# Patient Record
Sex: MALE | Race: ASIAN | NOT HISPANIC OR LATINO
[De-identification: names, ages, dates, MRNs, and addresses within clinical notes are randomized per-mention and may not be internally consistent; named-entity substitution may affect disease eponyms.]

---

## 2017-04-18 ENCOUNTER — TRANSCRIPTION ENCOUNTER (OUTPATIENT)
Age: 37
End: 2017-04-18

## 2017-05-11 ENCOUNTER — TRANSCRIPTION ENCOUNTER (OUTPATIENT)
Age: 37
End: 2017-05-11

## 2017-07-07 ENCOUNTER — TRANSCRIPTION ENCOUNTER (OUTPATIENT)
Age: 37
End: 2017-07-07

## 2018-06-04 ENCOUNTER — TRANSCRIPTION ENCOUNTER (OUTPATIENT)
Age: 38
End: 2018-06-04

## 2019-08-03 ENCOUNTER — TRANSCRIPTION ENCOUNTER (OUTPATIENT)
Age: 39
End: 2019-08-03

## 2022-06-24 ENCOUNTER — NON-APPOINTMENT (OUTPATIENT)
Age: 42
End: 2022-06-24

## 2022-07-28 ENCOUNTER — NON-APPOINTMENT (OUTPATIENT)
Age: 42
End: 2022-07-28

## 2022-07-29 ENCOUNTER — APPOINTMENT (OUTPATIENT)
Dept: FAMILY MEDICINE | Facility: CLINIC | Age: 42
End: 2022-07-29

## 2022-07-29 VITALS
WEIGHT: 301 LBS | HEART RATE: 84 BPM | OXYGEN SATURATION: 96 % | BODY MASS INDEX: 43.09 KG/M2 | TEMPERATURE: 98 F | HEIGHT: 70 IN | DIASTOLIC BLOOD PRESSURE: 88 MMHG | SYSTOLIC BLOOD PRESSURE: 142 MMHG

## 2022-07-29 DIAGNOSIS — Z23 ENCOUNTER FOR IMMUNIZATION: ICD-10-CM

## 2022-07-29 DIAGNOSIS — Z87.891 PERSONAL HISTORY OF NICOTINE DEPENDENCE: ICD-10-CM

## 2022-07-29 DIAGNOSIS — Z78.9 OTHER SPECIFIED HEALTH STATUS: ICD-10-CM

## 2022-07-29 DIAGNOSIS — Z82.49 FAMILY HISTORY OF ISCHEMIC HEART DISEASE AND OTHER DISEASES OF THE CIRCULATORY SYSTEM: ICD-10-CM

## 2022-07-29 DIAGNOSIS — Z86.39 PERSONAL HISTORY OF OTHER ENDOCRINE, NUTRITIONAL AND METABOLIC DISEASE: ICD-10-CM

## 2022-07-29 DIAGNOSIS — H91.90 UNSPECIFIED HEARING LOSS, UNSPECIFIED EAR: ICD-10-CM

## 2022-07-29 DIAGNOSIS — D56.3 THALASSEMIA MINOR: ICD-10-CM

## 2022-07-29 DIAGNOSIS — Z84.1 FAMILY HISTORY OF DISORDERS OF KIDNEY AND URETER: ICD-10-CM

## 2022-07-29 DIAGNOSIS — D64.9 ANEMIA, UNSPECIFIED: ICD-10-CM

## 2022-07-29 DIAGNOSIS — Z77.22 CONTACT WITH AND (SUSPECTED) EXPOSURE TO ENVIRONMENTAL TOBACCO SMOKE (ACUTE) (CHRONIC): ICD-10-CM

## 2022-07-29 PROCEDURE — 99386 PREV VISIT NEW AGE 40-64: CPT | Mod: 25

## 2022-07-29 PROCEDURE — 90471 IMMUNIZATION ADMIN: CPT

## 2022-07-29 PROCEDURE — 90715 TDAP VACCINE 7 YRS/> IM: CPT

## 2022-07-29 RX ORDER — AMOXICILLIN AND CLAVULANATE POTASSIUM 875; 125 MG/1; MG/1
875-125 TABLET, COATED ORAL
Qty: 14 | Refills: 0 | Status: COMPLETED | COMMUNITY
Start: 2022-05-26

## 2022-07-29 NOTE — HISTORY OF PRESENT ILLNESS
[FreeTextEntry1] : annual [de-identified] : 43 yo M with no significant PMH presents for annual.\par \par diet-- regular\par exercise-- just started recently\par \par HTN-- was on meds previously but does not remember which one.

## 2022-08-02 ENCOUNTER — TRANSCRIPTION ENCOUNTER (OUTPATIENT)
Age: 42
End: 2022-08-02

## 2022-08-02 ENCOUNTER — NON-APPOINTMENT (OUTPATIENT)
Age: 42
End: 2022-08-02

## 2022-08-02 LAB
25(OH)D3 SERPL-MCNC: 23.1 NG/ML
ALBUMIN SERPL ELPH-MCNC: 4.6 G/DL
ALP BLD-CCNC: 65 U/L
ALT SERPL-CCNC: 22 U/L
ANION GAP SERPL CALC-SCNC: 13 MMOL/L
AST SERPL-CCNC: 17 U/L
BASOPHILS # BLD AUTO: 0.03 K/UL
BASOPHILS NFR BLD AUTO: 0.5 %
BILIRUB SERPL-MCNC: 0.3 MG/DL
BUN SERPL-MCNC: 13 MG/DL
CALCIUM SERPL-MCNC: 8.7 MG/DL
CHLORIDE SERPL-SCNC: 103 MMOL/L
CHOLEST SERPL-MCNC: 178 MG/DL
CO2 SERPL-SCNC: 25 MMOL/L
CREAT SERPL-MCNC: 0.71 MG/DL
EGFR: 118 ML/MIN/1.73M2
EOSINOPHIL # BLD AUTO: 0.17 K/UL
EOSINOPHIL NFR BLD AUTO: 2.9 %
ESTIMATED AVERAGE GLUCOSE: 114 MG/DL
GLUCOSE SERPL-MCNC: 72 MG/DL
HBA1C MFR BLD HPLC: 5.6 %
HCT VFR BLD CALC: 45.2 %
HDLC SERPL-MCNC: 30 MG/DL
HGB BLD-MCNC: 12.8 G/DL
IMM GRANULOCYTES NFR BLD AUTO: 0.2 %
LDLC SERPL CALC-MCNC: 114 MG/DL
LYMPHOCYTES # BLD AUTO: 1.37 K/UL
LYMPHOCYTES NFR BLD AUTO: 23.4 %
MAN DIFF?: NORMAL
MCHC RBC-ENTMCNC: 21.1 PG
MCHC RBC-ENTMCNC: 28.3 GM/DL
MCV RBC AUTO: 74.5 FL
MONOCYTES # BLD AUTO: 0.51 K/UL
MONOCYTES NFR BLD AUTO: 8.7 %
NEUTROPHILS # BLD AUTO: 3.77 K/UL
NEUTROPHILS NFR BLD AUTO: 64.3 %
NONHDLC SERPL-MCNC: 148 MG/DL
PLATELET # BLD AUTO: 335 K/UL
POTASSIUM SERPL-SCNC: 3.9 MMOL/L
PROT SERPL-MCNC: 7.2 G/DL
RBC # BLD: 6.07 M/UL
RBC # FLD: 18.4 %
SODIUM SERPL-SCNC: 141 MMOL/L
TRIGL SERPL-MCNC: 170 MG/DL
TSH SERPL-ACNC: 2.5 UIU/ML
VIT B12 SERPL-MCNC: 421 PG/ML
WBC # FLD AUTO: 5.86 K/UL

## 2022-09-30 ENCOUNTER — APPOINTMENT (OUTPATIENT)
Dept: FAMILY MEDICINE | Facility: CLINIC | Age: 42
End: 2022-09-30

## 2022-09-30 VITALS
HEIGHT: 70 IN | HEART RATE: 82 BPM | OXYGEN SATURATION: 95 % | BODY MASS INDEX: 42.37 KG/M2 | DIASTOLIC BLOOD PRESSURE: 92 MMHG | SYSTOLIC BLOOD PRESSURE: 140 MMHG | WEIGHT: 296 LBS | TEMPERATURE: 98.2 F

## 2022-09-30 VITALS — SYSTOLIC BLOOD PRESSURE: 134 MMHG | DIASTOLIC BLOOD PRESSURE: 90 MMHG

## 2022-09-30 PROCEDURE — 99213 OFFICE O/P EST LOW 20 MIN: CPT

## 2022-09-30 NOTE — HISTORY OF PRESENT ILLNESS
[FreeTextEntry1] : f/u BP [de-identified] : 43 yo M with HTN presents for BP f/u. Pt admits to being inconsistent with amlodipine. Does not always check BP as well. Does report when he checks at home, BPs usually 130s/80s. Has been trying to eat healthier. Lost 5 lbs since last visit.\par \par

## 2022-11-18 ENCOUNTER — APPOINTMENT (OUTPATIENT)
Dept: FAMILY MEDICINE | Facility: CLINIC | Age: 42
End: 2022-11-18

## 2022-11-18 VITALS
HEIGHT: 70 IN | WEIGHT: 296 LBS | DIASTOLIC BLOOD PRESSURE: 91 MMHG | HEART RATE: 96 BPM | OXYGEN SATURATION: 97 % | BODY MASS INDEX: 42.37 KG/M2 | TEMPERATURE: 97.5 F | SYSTOLIC BLOOD PRESSURE: 142 MMHG

## 2022-11-18 VITALS — SYSTOLIC BLOOD PRESSURE: 124 MMHG | DIASTOLIC BLOOD PRESSURE: 90 MMHG

## 2022-11-18 VITALS — DIASTOLIC BLOOD PRESSURE: 69 MMHG | SYSTOLIC BLOOD PRESSURE: 126 MMHG

## 2022-11-18 DIAGNOSIS — J18.9 PNEUMONIA, UNSPECIFIED ORGANISM: ICD-10-CM

## 2022-11-18 PROCEDURE — 99214 OFFICE O/P EST MOD 30 MIN: CPT

## 2022-11-18 RX ORDER — METHYLPREDNISOLONE 4 MG/1
4 TABLET ORAL
Qty: 21 | Refills: 0 | Status: COMPLETED | COMMUNITY
Start: 2022-11-05

## 2022-11-18 RX ORDER — AZITHROMYCIN 250 MG/1
250 TABLET, FILM COATED ORAL
Qty: 6 | Refills: 0 | Status: COMPLETED | COMMUNITY
Start: 2022-11-05

## 2022-11-18 RX ORDER — BENZONATATE 200 MG/1
200 CAPSULE ORAL
Qty: 30 | Refills: 1 | Status: ACTIVE | COMMUNITY
Start: 2022-11-05 | End: 1900-01-01

## 2022-11-18 RX ORDER — AMLODIPINE BESYLATE 10 MG/1
10 TABLET ORAL DAILY
Qty: 90 | Refills: 3 | Status: ACTIVE | COMMUNITY
Start: 2022-07-29

## 2022-11-18 RX ORDER — AMOXICILLIN 500 MG/1
500 CAPSULE ORAL
Qty: 42 | Refills: 0 | Status: COMPLETED | COMMUNITY
Start: 2022-11-06

## 2022-11-18 NOTE — HISTORY OF PRESENT ILLNESS
[FreeTextEntry8] : cc-- pneumonia f/u\par \par 43 yo M was dx atypical pneumonia at [facility] 2 weeks ago--- had CXR findings. Report scanned in. He was treated with amoxicillin, azithromycin, medrol pack and benzonatate. He felt better after 1 week. He feels cough slowly coming back. Also having left sided rib pain, worse with coughing and movement.\par \par BPs at home have been consistently 140s/90s. Has not been using any OTC cough medicine at the time. \par  No

## 2023-01-25 ENCOUNTER — APPOINTMENT (OUTPATIENT)
Dept: FAMILY MEDICINE | Facility: CLINIC | Age: 43
End: 2023-01-25

## 2023-07-14 ENCOUNTER — RX RENEWAL (OUTPATIENT)
Age: 43
End: 2023-07-14

## 2023-07-14 RX ORDER — AMLODIPINE BESYLATE 5 MG/1
5 TABLET ORAL
Qty: 90 | Refills: 3 | Status: ACTIVE | COMMUNITY
Start: 2023-07-14 | End: 1900-01-01

## 2024-01-02 ENCOUNTER — LABORATORY RESULT (OUTPATIENT)
Age: 44
End: 2024-01-02

## 2024-01-02 ENCOUNTER — APPOINTMENT (OUTPATIENT)
Dept: INTERNAL MEDICINE | Facility: CLINIC | Age: 44
End: 2024-01-02
Payer: COMMERCIAL

## 2024-01-02 VITALS
OXYGEN SATURATION: 94 % | WEIGHT: 301 LBS | HEIGHT: 70 IN | BODY MASS INDEX: 43.09 KG/M2 | DIASTOLIC BLOOD PRESSURE: 98 MMHG | HEART RATE: 89 BPM | SYSTOLIC BLOOD PRESSURE: 163 MMHG

## 2024-01-02 VITALS — SYSTOLIC BLOOD PRESSURE: 150 MMHG | DIASTOLIC BLOOD PRESSURE: 90 MMHG

## 2024-01-02 DIAGNOSIS — Z00.00 ENCOUNTER FOR GENERAL ADULT MEDICAL EXAMINATION W/OUT ABNORMAL FINDINGS: ICD-10-CM

## 2024-01-02 DIAGNOSIS — E55.9 VITAMIN D DEFICIENCY, UNSPECIFIED: ICD-10-CM

## 2024-01-02 DIAGNOSIS — I10 ESSENTIAL (PRIMARY) HYPERTENSION: ICD-10-CM

## 2024-01-02 DIAGNOSIS — R06.83 SNORING: ICD-10-CM

## 2024-01-02 PROCEDURE — 99396 PREV VISIT EST AGE 40-64: CPT | Mod: 25

## 2024-01-02 PROCEDURE — 99214 OFFICE O/P EST MOD 30 MIN: CPT | Mod: 25

## 2024-01-02 NOTE — HISTORY OF PRESENT ILLNESS
[FreeTextEntry1] : annual [de-identified] : 44 yo M with HTN presents for annual.   Lost 15 lbs but gained back.   Diet/exercise poor. + snoring. Sleeps well, but doesn't sleep enough.  Noncompliant with meds-- forgets to take them. Forgot to take this morning. Also not really checking at home. On amlodipine 7.5mg (1.5 tablets)

## 2024-01-02 NOTE — HEALTH RISK ASSESSMENT
[Fully functional (bathing, dressing, toileting, transferring, walking, feeding)] : Fully functional (bathing, dressing, toileting, transferring, walking, feeding) [Fully functional (using the telephone, shopping, preparing meals, housekeeping, doing laundry, using] : Fully functional and needs no help or supervision to perform IADLs (using the telephone, shopping, preparing meals, housekeeping, doing laundry, using transportation, managing medications and managing finances) [Never] : Never

## 2024-01-04 ENCOUNTER — TRANSCRIPTION ENCOUNTER (OUTPATIENT)
Age: 44
End: 2024-01-04

## 2024-01-04 LAB
25(OH)D3 SERPL-MCNC: 22.6 NG/ML
ALBUMIN SERPL ELPH-MCNC: 4.5 G/DL
ALP BLD-CCNC: 64 U/L
ALT SERPL-CCNC: 24 U/L
ANION GAP SERPL CALC-SCNC: 15 MMOL/L
AST SERPL-CCNC: 17 U/L
BASOPHILS # BLD AUTO: 0.03 K/UL
BASOPHILS NFR BLD AUTO: 0.4 %
BILIRUB SERPL-MCNC: 0.4 MG/DL
BUN SERPL-MCNC: 9 MG/DL
CALCIUM SERPL-MCNC: 9.2 MG/DL
CHLORIDE SERPL-SCNC: 104 MMOL/L
CHOLEST SERPL-MCNC: 182 MG/DL
CO2 SERPL-SCNC: 25 MMOL/L
CREAT SERPL-MCNC: 0.7 MG/DL
EGFR: 118 ML/MIN/1.73M2
EOSINOPHIL # BLD AUTO: 0.2 K/UL
EOSINOPHIL NFR BLD AUTO: 2.9 %
ESTIMATED AVERAGE GLUCOSE: 97 MG/DL
GLUCOSE SERPL-MCNC: 60 MG/DL
HBA1C MFR BLD HPLC: 5 %
HCT VFR BLD CALC: 44.9 %
HDLC SERPL-MCNC: 31 MG/DL
HGB BLD-MCNC: 13.4 G/DL
IMM GRANULOCYTES NFR BLD AUTO: 0.4 %
LDLC SERPL CALC-MCNC: 123 MG/DL
LYMPHOCYTES # BLD AUTO: 1.29 K/UL
LYMPHOCYTES NFR BLD AUTO: 18.6 %
MAN DIFF?: NORMAL
MCHC RBC-ENTMCNC: 20.3 PG
MCHC RBC-ENTMCNC: 29.8 GM/DL
MCV RBC AUTO: 68 FL
MONOCYTES # BLD AUTO: 0.64 K/UL
MONOCYTES NFR BLD AUTO: 9.2 %
NEUTROPHILS # BLD AUTO: 4.75 K/UL
NEUTROPHILS NFR BLD AUTO: 68.5 %
NONHDLC SERPL-MCNC: 151 MG/DL
PLATELET # BLD AUTO: 350 K/UL
POTASSIUM SERPL-SCNC: 3.4 MMOL/L
PROT SERPL-MCNC: 7.5 G/DL
RBC # BLD: 6.6 M/UL
RBC # FLD: 19.2 %
SODIUM SERPL-SCNC: 144 MMOL/L
TRIGL SERPL-MCNC: 154 MG/DL
TSH SERPL-ACNC: 3.37 UIU/ML
VIT B12 SERPL-MCNC: 434 PG/ML
WBC # FLD AUTO: 6.94 K/UL

## 2024-12-08 ENCOUNTER — NON-APPOINTMENT (OUTPATIENT)
Age: 44
End: 2024-12-08

## 2025-01-02 ENCOUNTER — INPATIENT (INPATIENT)
Facility: HOSPITAL | Age: 45
LOS: 11 days | Discharge: HOME | DRG: 641 | End: 2025-01-14
Attending: SURGERY | Admitting: STUDENT IN AN ORGANIZED HEALTH CARE EDUCATION/TRAINING PROGRAM
Payer: COMMERCIAL

## 2025-01-02 VITALS
SYSTOLIC BLOOD PRESSURE: 133 MMHG | OXYGEN SATURATION: 94 % | RESPIRATION RATE: 17 BRPM | WEIGHT: 300.05 LBS | HEART RATE: 86 BPM | HEIGHT: 70 IN | TEMPERATURE: 98 F | DIASTOLIC BLOOD PRESSURE: 83 MMHG

## 2025-01-02 DIAGNOSIS — E16.2 HYPOGLYCEMIA, UNSPECIFIED: ICD-10-CM

## 2025-01-02 DIAGNOSIS — I10 ESSENTIAL (PRIMARY) HYPERTENSION: ICD-10-CM

## 2025-01-02 DIAGNOSIS — Z29.9 ENCOUNTER FOR PROPHYLACTIC MEASURES, UNSPECIFIED: ICD-10-CM

## 2025-01-02 LAB
ADD ON TEST-SPECIMEN IN LAB: SIGNIFICANT CHANGE UP
ADD ON TEST-SPECIMEN IN LAB: SIGNIFICANT CHANGE UP
ALBUMIN SERPL ELPH-MCNC: 4.1 G/DL — SIGNIFICANT CHANGE UP (ref 3.3–5)
ALP SERPL-CCNC: 76 U/L — SIGNIFICANT CHANGE UP (ref 40–120)
ALT FLD-CCNC: 28 U/L — SIGNIFICANT CHANGE UP (ref 10–45)
ANION GAP SERPL CALC-SCNC: 14 MMOL/L — SIGNIFICANT CHANGE UP (ref 5–17)
ANISOCYTOSIS BLD QL: SLIGHT — SIGNIFICANT CHANGE UP
APPEARANCE UR: CLEAR — SIGNIFICANT CHANGE UP
AST SERPL-CCNC: 25 U/L — SIGNIFICANT CHANGE UP (ref 10–40)
B-OH-BUTYR SERPL-SCNC: 0 MMOL/L — SIGNIFICANT CHANGE UP
BACTERIA # UR AUTO: NEGATIVE /HPF — SIGNIFICANT CHANGE UP
BASOPHILS # BLD AUTO: 0.05 K/UL — SIGNIFICANT CHANGE UP (ref 0–0.2)
BASOPHILS NFR BLD AUTO: 0.8 % — SIGNIFICANT CHANGE UP (ref 0–2)
BILIRUB SERPL-MCNC: 0.5 MG/DL — SIGNIFICANT CHANGE UP (ref 0.2–1.2)
BILIRUB UR-MCNC: NEGATIVE — SIGNIFICANT CHANGE UP
BUN SERPL-MCNC: 8 MG/DL — SIGNIFICANT CHANGE UP (ref 7–23)
CALCIUM SERPL-MCNC: 8.8 MG/DL — SIGNIFICANT CHANGE UP (ref 8.4–10.5)
CAST: 0 /LPF — SIGNIFICANT CHANGE UP (ref 0–4)
CHLORIDE SERPL-SCNC: 104 MMOL/L — SIGNIFICANT CHANGE UP (ref 96–108)
CO2 SERPL-SCNC: 21 MMOL/L — LOW (ref 22–31)
COLOR SPEC: YELLOW — SIGNIFICANT CHANGE UP
CREAT SERPL-MCNC: 0.66 MG/DL — SIGNIFICANT CHANGE UP (ref 0.5–1.3)
DACRYOCYTES BLD QL SMEAR: SLIGHT — SIGNIFICANT CHANGE UP
DIFF PNL FLD: NEGATIVE — SIGNIFICANT CHANGE UP
EGFR: 119 ML/MIN/1.73M2 — SIGNIFICANT CHANGE UP
EOSINOPHIL # BLD AUTO: 0.18 K/UL — SIGNIFICANT CHANGE UP (ref 0–0.5)
EOSINOPHIL NFR BLD AUTO: 2.6 % — SIGNIFICANT CHANGE UP (ref 0–6)
FLUAV AG NPH QL: SIGNIFICANT CHANGE UP
FLUBV AG NPH QL: SIGNIFICANT CHANGE UP
GLUCOSE BLDC GLUCOMTR-MCNC: 65 MG/DL — LOW (ref 70–99)
GLUCOSE BLDC GLUCOMTR-MCNC: 66 MG/DL — LOW (ref 70–99)
GLUCOSE BLDC GLUCOMTR-MCNC: 68 MG/DL — LOW (ref 70–99)
GLUCOSE BLDC GLUCOMTR-MCNC: 89 MG/DL — SIGNIFICANT CHANGE UP (ref 70–99)
GLUCOSE BLDC GLUCOMTR-MCNC: 98 MG/DL — SIGNIFICANT CHANGE UP (ref 70–99)
GLUCOSE SERPL-MCNC: 39 MG/DL — CRITICAL LOW (ref 70–99)
GLUCOSE UR QL: NEGATIVE MG/DL — SIGNIFICANT CHANGE UP
HCT VFR BLD CALC: 45 % — SIGNIFICANT CHANGE UP (ref 39–50)
HGB BLD-MCNC: 13.5 G/DL — SIGNIFICANT CHANGE UP (ref 13–17)
INSULIN SERPL-MCNC: 27.6 UU/ML — HIGH (ref 2.6–24.9)
KETONES UR-MCNC: NEGATIVE MG/DL — SIGNIFICANT CHANGE UP
LEUKOCYTE ESTERASE UR-ACNC: NEGATIVE — SIGNIFICANT CHANGE UP
LYMPHOCYTES # BLD AUTO: 0.52 K/UL — LOW (ref 1–3.3)
LYMPHOCYTES # BLD AUTO: 7.7 % — LOW (ref 13–44)
MANUAL SMEAR VERIFICATION: SIGNIFICANT CHANGE UP
MCHC RBC-ENTMCNC: 20.3 PG — LOW (ref 27–34)
MCHC RBC-ENTMCNC: 30 G/DL — LOW (ref 32–36)
MCV RBC AUTO: 67.8 FL — LOW (ref 80–100)
MICROCYTES BLD QL: SLIGHT — SIGNIFICANT CHANGE UP
MONOCYTES # BLD AUTO: 0.52 K/UL — SIGNIFICANT CHANGE UP (ref 0–0.9)
MONOCYTES NFR BLD AUTO: 7.7 % — SIGNIFICANT CHANGE UP (ref 2–14)
NEUTROPHILS # BLD AUTO: 5.48 K/UL — SIGNIFICANT CHANGE UP (ref 1.8–7.4)
NEUTROPHILS NFR BLD AUTO: 81.2 % — HIGH (ref 43–77)
NITRITE UR-MCNC: NEGATIVE — SIGNIFICANT CHANGE UP
OVALOCYTES BLD QL SMEAR: SLIGHT — SIGNIFICANT CHANGE UP
PH UR: 7.5 — SIGNIFICANT CHANGE UP (ref 5–8)
PLAT MORPH BLD: NORMAL — SIGNIFICANT CHANGE UP
PLATELET # BLD AUTO: 389 K/UL — SIGNIFICANT CHANGE UP (ref 150–400)
POIKILOCYTOSIS BLD QL AUTO: SLIGHT — SIGNIFICANT CHANGE UP
POLYCHROMASIA BLD QL SMEAR: SLIGHT — SIGNIFICANT CHANGE UP
POTASSIUM SERPL-MCNC: 3.3 MMOL/L — LOW (ref 3.5–5.3)
POTASSIUM SERPL-SCNC: 3.3 MMOL/L — LOW (ref 3.5–5.3)
PROT SERPL-MCNC: 7.7 G/DL — SIGNIFICANT CHANGE UP (ref 6–8.3)
PROT UR-MCNC: NEGATIVE MG/DL — SIGNIFICANT CHANGE UP
RBC # BLD: 6.64 M/UL — HIGH (ref 4.2–5.8)
RBC # FLD: 18.2 % — HIGH (ref 10.3–14.5)
RBC BLD AUTO: ABNORMAL
RBC CASTS # UR COMP ASSIST: 0 /HPF — SIGNIFICANT CHANGE UP (ref 0–4)
RSV RNA NPH QL NAA+NON-PROBE: SIGNIFICANT CHANGE UP
SARS-COV-2 RNA SPEC QL NAA+PROBE: SIGNIFICANT CHANGE UP
SCHISTOCYTES BLD QL AUTO: SLIGHT — SIGNIFICANT CHANGE UP
SODIUM SERPL-SCNC: 139 MMOL/L — SIGNIFICANT CHANGE UP (ref 135–145)
SP GR SPEC: >1.03 — HIGH (ref 1–1.03)
SQUAMOUS # UR AUTO: 0 /HPF — SIGNIFICANT CHANGE UP (ref 0–5)
TSH SERPL-MCNC: 1.6 UIU/ML — SIGNIFICANT CHANGE UP (ref 0.27–4.2)
UROBILINOGEN FLD QL: 0.2 MG/DL — SIGNIFICANT CHANGE UP (ref 0.2–1)
WBC # BLD: 6.75 K/UL — SIGNIFICANT CHANGE UP (ref 3.8–10.5)
WBC # FLD AUTO: 6.75 K/UL — SIGNIFICANT CHANGE UP (ref 3.8–10.5)
WBC UR QL: 1 /HPF — SIGNIFICANT CHANGE UP (ref 0–5)

## 2025-01-02 PROCEDURE — 99255 IP/OBS CONSLTJ NEW/EST HI 80: CPT | Mod: GC

## 2025-01-02 PROCEDURE — 99223 1ST HOSP IP/OBS HIGH 75: CPT | Mod: GC

## 2025-01-02 PROCEDURE — 74177 CT ABD & PELVIS W/CONTRAST: CPT | Mod: 26,MC

## 2025-01-02 PROCEDURE — 70450 CT HEAD/BRAIN W/O DYE: CPT | Mod: 26,MC

## 2025-01-02 PROCEDURE — 99285 EMERGENCY DEPT VISIT HI MDM: CPT

## 2025-01-02 RX ORDER — DEXTROSE MONOHYDRATE 25 G/50ML
12.5 INJECTION, SOLUTION INTRAVENOUS ONCE
Refills: 0 | Status: COMPLETED | OUTPATIENT
Start: 2025-01-02 | End: 2025-01-02

## 2025-01-02 RX ORDER — DEXTROSE MONOHYDRATE 25 G/50ML
25 INJECTION, SOLUTION INTRAVENOUS ONCE
Refills: 0 | Status: DISCONTINUED | OUTPATIENT
Start: 2025-01-02 | End: 2025-01-03

## 2025-01-02 RX ORDER — SODIUM CHLORIDE 9 MG/ML
1000 INJECTION, SOLUTION INTRAVENOUS
Refills: 0 | Status: DISCONTINUED | OUTPATIENT
Start: 2025-01-02 | End: 2025-01-03

## 2025-01-02 RX ORDER — DEXTROSE MONOHYDRATE 25 G/50ML
25 INJECTION, SOLUTION INTRAVENOUS ONCE
Refills: 0 | Status: COMPLETED | OUTPATIENT
Start: 2025-01-02 | End: 2025-01-02

## 2025-01-02 RX ORDER — ENOXAPARIN SODIUM 60 MG/.6ML
40 INJECTION INTRAVENOUS; SUBCUTANEOUS EVERY 24 HOURS
Refills: 0 | Status: DISCONTINUED | OUTPATIENT
Start: 2025-01-02 | End: 2025-01-09

## 2025-01-02 RX ORDER — SODIUM CHLORIDE 9 MG/ML
1000 INJECTION, SOLUTION INTRAMUSCULAR; INTRAVENOUS; SUBCUTANEOUS ONCE
Refills: 0 | Status: COMPLETED | OUTPATIENT
Start: 2025-01-02 | End: 2025-01-02

## 2025-01-02 RX ORDER — GLUCAGON INJECTION, SOLUTION 0.5 MG/.1ML
1 INJECTION, SOLUTION SUBCUTANEOUS ONCE
Refills: 0 | Status: DISCONTINUED | OUTPATIENT
Start: 2025-01-02 | End: 2025-01-03

## 2025-01-02 RX ORDER — POTASSIUM CHLORIDE 600 MG/1
40 TABLET, FILM COATED, EXTENDED RELEASE ORAL ONCE
Refills: 0 | Status: COMPLETED | OUTPATIENT
Start: 2025-01-02 | End: 2025-01-02

## 2025-01-02 RX ORDER — DEXTROSE MONOHYDRATE 25 G/50ML
12.5 INJECTION, SOLUTION INTRAVENOUS ONCE
Refills: 0 | Status: DISCONTINUED | OUTPATIENT
Start: 2025-01-02 | End: 2025-01-03

## 2025-01-02 RX ORDER — SODIUM CHLORIDE 9 MG/ML
1000 INJECTION, SOLUTION INTRAVENOUS
Refills: 0 | Status: DISCONTINUED | OUTPATIENT
Start: 2025-01-02 | End: 2025-01-02

## 2025-01-02 RX ORDER — DEXTROSE MONOHYDRATE 25 G/50ML
15 INJECTION, SOLUTION INTRAVENOUS ONCE
Refills: 0 | Status: DISCONTINUED | OUTPATIENT
Start: 2025-01-02 | End: 2025-01-03

## 2025-01-02 RX ADMIN — SODIUM CHLORIDE 100 MILLILITER(S): 9 INJECTION, SOLUTION INTRAVENOUS at 16:08

## 2025-01-02 RX ADMIN — SODIUM CHLORIDE 1000 MILLILITER(S): 9 INJECTION, SOLUTION INTRAMUSCULAR; INTRAVENOUS; SUBCUTANEOUS at 08:57

## 2025-01-02 RX ADMIN — DEXTROSE MONOHYDRATE 25 MILLILITER(S): 25 INJECTION, SOLUTION INTRAVENOUS at 16:34

## 2025-01-02 RX ADMIN — DEXTROSE MONOHYDRATE 12.5 GRAM(S): 25 INJECTION, SOLUTION INTRAVENOUS at 23:13

## 2025-01-02 RX ADMIN — SODIUM CHLORIDE 100 MILLILITER(S): 9 INJECTION, SOLUTION INTRAVENOUS at 22:33

## 2025-01-02 RX ADMIN — POTASSIUM CHLORIDE 40 MILLIEQUIVALENT(S): 600 TABLET, FILM COATED, EXTENDED RELEASE ORAL at 10:45

## 2025-01-02 NOTE — H&P ADULT - NSHPPHYSICALEXAM_GEN_ALL_CORE
LOS:     VITALS:   T(C): 36.9 (01-02-25 @ 19:35), Max: 37 (01-02-25 @ 11:28)  HR: 87 (01-02-25 @ 19:35) (79 - 89)  BP: 145/77 (01-02-25 @ 19:35) (133/83 - 145/77)  RR: 18 (01-02-25 @ 19:35) (16 - 18)  SpO2: 96% (01-02-25 @ 19:35) (94% - 100%)    GENERAL: NAD, lying in bed comfortably  CHEST/LUNG: Clear to auscultation bilaterally; No rales, rhonchi, wheezing, or rubs. Unlabored respirations  HEART: Regular rate and rhythm; No murmurs, rubs, or gallops  ABDOMEN: BSx4; Soft, nontender, nondistended  EXTREMITIES:  2+ Peripheral Pulses, brisk capillary refill. No clubbing, cyanosis, or edema  NERVOUS SYSTEM:  A&Ox3, no focal deficits   SKIN: No rashes or lesions

## 2025-01-02 NOTE — H&P ADULT - NSHPREVIEWOFSYSTEMS_GEN_ALL_CORE
Review of Systems:  Constitutional: No fever, No weight loss, poor appetite/po intake  Head: No headache   Eyes: No blurry vision, No diplopia  Neuro: No tremors, No muscle weakness   Cardiovascular: No chest pain, No palpitations  Respiratory: No SOB, No cough  GI: No nausea, No vomiting, + diarrhea  : No dysuria, No hematuria  Skin: No rash  MSK: No joint pain   Psych: No depression  Heme: No abnormal bruising, no abnormal bleeding

## 2025-01-02 NOTE — CONSULT NOTE ADULT - SUBJECTIVE AND OBJECTIVE BOX
ENDOCRINE HX:  Patient is a 44 year old male with past medical history of hypertension who presented to the hospital "feeling unwell". He states that he recently took a trip to [place] with his family. He and his family experienced nausea, vomiting, diarrhea and symptoms of altitude sickness. They returned back to [place] but patient continued to feel unwell. This morning he had diarrhea and took a shower and became lightheaded and altered and came into the ED where he was noted to be hypoglycemic to 39 on BMP.   On ROS, he denies recent weight gain but hasn't checked recently. He states that prior to this episode he has not had symptoms of hypoglycemia. He denies postprandial tiredness, does not wake up in middle of the night to eat. He denies episodes of tachycardia, sweating, shakiness in the past prior to this past week.  He denies taking any medications that were not prescribed to him. Does not live with anyone who has diabetes. Has never used insulin.  Family history of renal disease in his mother requiring transplant. Unclear cause of renal disease. Did not have diabetes.   No personal history of renal stones or calcium problems. No family history of thyroid disease.   CT abd/pel with PANCREAS: Lobulated enhancing mass at the pancreatic tail measuring 4.3 x 3.7 cm, with abutment of the left kidney at the posterior margin. Additional hypoattenuating lesion in the pancreatic body measuring 2.1 x 1.8 cm.  ADRENALS: Nonspecific mildly thickened bilateral adrenal glands.     PAST MEDICAL & SURGICAL HISTORY:      FAMILY HISTORY:  ESRD in his mother      Social History: remote history of tobacco use, no current history of alcohol use    Outpatient Medications:  amlodipine 7.5 mg daily      Allergies    No Known Allergies    Intolerances      Review of Systems:  Constitutional: No fever  Eyes: No blurry vision  Neuro: No tremors  HEENT: No pain  Cardiovascular: No chest pain, palpitations  Respiratory: No SOB, no cough  GI: No nausea, vomiting, abdominal pain  : No dysuria  Skin: no rash  Psych: no depression  Endocrine: no polyuria, polydipsia  Hem/lymph: no swelling  Osteoporosis: no fractures    ALL OTHER SYSTEMS REVIEWED AND NEGATIVE    PHYSICAL EXAM:  VITALS: T(C): 37 (01-02-25 @ 11:28)  T(F): 98.6 (01-02-25 @ 11:28), Max: 98.6 (01-02-25 @ 11:28)  HR: 89 (01-02-25 @ 11:28) (79 - 89)  BP: 137/79 (01-02-25 @ 11:28) (133/83 - 138/80)  RR:  (16 - 18)  SpO2:  (94% - 100%)  Wt(kg): 136.1 kg  GENERAL: NAD, well-groomed, well-developed, obese  EYES: No proptosis, no lid lag, anicteric  HEENT:  Atraumatic, Normocephalic, moist mucous membranes  RESPIRATORY: Normal respiratory effort; no audible wheezing  SKIN: Dry, intact, No rashes or lesions  MUSCULOSKELETAL: Full range of motion, normal strength  NEURO: sensation intact, extraocular movements intact, no tremor  PSYCH: Alert and oriented x 3, normal affect, normal mood  CUSHING'S SIGNS: no striae      CAPILLARY BLOOD GLUCOSE      POCT Blood Glucose.: 46 mg/dL (02 Jan 2025 15:45)  POCT Blood Glucose.: 43 mg/dL (02 Jan 2025 15:44)  POCT Blood Glucose.: 55 mg/dL (02 Jan 2025 14:55)  POCT Blood Glucose.: 85 mg/dL (02 Jan 2025 11:26)  POCT Blood Glucose.: 84 mg/dL (02 Jan 2025 10:49)  POCT Blood Glucose.: 51 mg/dL (02 Jan 2025 10:27)  POCT Blood Glucose.: 50 mg/dL (02 Jan 2025 10:26)                            13.5   6.75  )-----------( 389      ( 02 Jan 2025 09:06 )             45.0       01-02    139  |  104  |  8   ----------------------------<  39[LL]  3.3[L]   |  21[L]  |  0.66    eGFR: 119    Ca    8.8      01-02  Mg     2.4     01-02    TPro  7.7  /  Alb  4.1  /  TBili  0.5  /  DBili  x   /  AST  25  /  ALT  28  /  AlkPhos  76  01-02                             ENDOCRINE HX:  Patient is a 44 year old male with past medical history of hypertension who presented to the hospital "feeling unwell". He states that he recently took a trip to [place] with his family. He and his family experienced nausea, vomiting, diarrhea and symptoms of altitude sickness. They returned back to [place] but patient continued to feel unwell. This morning he had diarrhea and took a shower and became lightheaded and altered and came into the ED where he was noted to be hypoglycemic to 39 on BMP.   On ROS, he denies recent weight gain but hasn't checked recently. He states that prior to this episode he has not had symptoms of hypoglycemia. He denies postprandial tiredness, does not wake up in middle of the night to eat. He denies episodes of tachycardia, sweating, shakiness in the past prior to this past week.  He denies taking any medications that were not prescribed to him. Does not live with anyone who has diabetes. Has never used insulin.  Family history of renal disease in his mother requiring transplant. Unclear cause of renal disease. Did not have diabetes.   No personal history of renal stones or calcium problems. No family history of thyroid disease.   CT abd/pel with PANCREAS: Lobulated enhancing mass at the pancreatic tail measuring 4.3 x 3.7 cm, with abutment of the left kidney at the posterior margin. Additional hypoattenuating lesion in the pancreatic body measuring 2.1 x 1.8 cm.  ADRENALS: Nonspecific mildly thickened bilateral adrenal glands.     PAST MEDICAL & SURGICAL HISTORY:      FAMILY HISTORY:  ESRD in his mother      Social History: remote history of tobacco use, no current history of alcohol use    Outpatient Medications:  amlodipine 7.5 mg daily      Allergies    No Known Allergies    Intolerances      Review of Systems:  Constitutional: No fever  Eyes: No blurry vision  Neuro: No tremors  HEENT: No pain  Cardiovascular: No chest pain, palpitations  Respiratory: No SOB, no cough  GI: No nausea, vomiting, abdominal pain  : No dysuria  Skin: no rash  Psych: no depression  Endocrine: no polyuria, polydipsia  Hem/lymph: no swelling  Osteoporosis: no fractures    ALL OTHER SYSTEMS REVIEWED AND NEGATIVE    PHYSICAL EXAM:  VITALS: T(C): 37 (01-02-25 @ 11:28)  T(F): 98.6 (01-02-25 @ 11:28), Max: 98.6 (01-02-25 @ 11:28)  HR: 89 (01-02-25 @ 11:28) (79 - 89)  BP: 137/79 (01-02-25 @ 11:28) (133/83 - 138/80)  RR:  (16 - 18)  SpO2:  (94% - 100%)  Wt(kg): 136.1 kg  GENERAL: NAD, well-groomed, well-developed, obese  EYES: No proptosis, no lid lag, anicteric  HEENT:  Atraumatic, Normocephalic, moist mucous membranes  RESPIRATORY: Normal respiratory effort; no audible wheezing  SKIN: Dry, intact, No rashes or lesions  MUSCULOSKELETAL: Full range of motion, normal strength  NEURO: sensation intact, extraocular movements intact, no tremor, slurring speech  PSYCH: Alert and oriented x 3, normal affect, normal mood  CUSHING'S SIGNS: no striae      CAPILLARY BLOOD GLUCOSE      POCT Blood Glucose.: 46 mg/dL (02 Jan 2025 15:45)  POCT Blood Glucose.: 43 mg/dL (02 Jan 2025 15:44)  POCT Blood Glucose.: 55 mg/dL (02 Jan 2025 14:55)  POCT Blood Glucose.: 85 mg/dL (02 Jan 2025 11:26)  POCT Blood Glucose.: 84 mg/dL (02 Jan 2025 10:49)  POCT Blood Glucose.: 51 mg/dL (02 Jan 2025 10:27)  POCT Blood Glucose.: 50 mg/dL (02 Jan 2025 10:26)                            13.5   6.75  )-----------( 389      ( 02 Jan 2025 09:06 )             45.0       01-02    139  |  104  |  8   ----------------------------<  39[LL]  3.3[L]   |  21[L]  |  0.66    eGFR: 119    Ca    8.8      01-02  Mg     2.4     01-02    TPro  7.7  /  Alb  4.1  /  TBili  0.5  /  DBili  x   /  AST  25  /  ALT  28  /  AlkPhos  76  01-02

## 2025-01-02 NOTE — ED ADULT NURSE REASSESSMENT NOTE - NS ED NURSE REASSESS COMMENT FT1
report taken from GAMAL Davis, pt assessed, vss, pt resting comfortably, pending endocrinology and CT

## 2025-01-02 NOTE — ED ADULT NURSE NOTE - NS ED NURSE REPORT GIVEN TO FT
attempted report at 18:15, no answer attempted report at 18:15, no answer, attempted again 18:30 no answer attempted report at 18:15, no answer, attempted again 18:30 no answer. report given 1933 - GAMAL Mckeon

## 2025-01-02 NOTE — ED PROVIDER NOTE - OBJECTIVE STATEMENT
Discuss foot pain.  Give referral to Podiatry.  Rest area.  May give Ibuprofen as needed.  Re contact clinic as needed for acute illness.  
44 M [facility], accompanied by wife yoel to the ER w/ "hallucinations" pt was in [place] in Dec 29 came back to [place] that day and when he came home was found to be w/ spraying the ceiling w/ water when he was taking a shower, pt was w/ diarrhea, and nausea no ovmiting, eating and drinking normally, pt w/ no cp, no sob, no abd pain, pt went to  and told to come to the hospital [place], pt refused went home but has persistent sx which prompted ER. pt w/ no fevers, no chills.

## 2025-01-02 NOTE — H&P ADULT - HISTORY OF PRESENT ILLNESS
45 yo M with pmhx of HTN comes to the ED after experiencing confusion and weakness. Patient's symptoms started on 12/30 while he was away in [place]. He was experiencing 4 episodes of diarrhea at the time. However, he attributed his symptoms to gastroenteritis as his family members were experiencing similar symptoms. He returned to [place] and was found to be experiencing confusion as he was spraying the ceiling with water. He went to urgent care and was then recommended to come to the hospital. He has never experienced symptoms like this prior. His ROS otherwise is notable for a history of night sweats and he is unsure if he has experienced unintentional weight loss. Today, he is feeling better as his confusion and weakness has improved. His last episode of diarrhea was also the day prior to coming to the hospital.     In the ED, his vitals were wnl and his labs were significant for persistent hypoglycemia and hypoglycemia. A CT A/P was also obtained that showed pancreatic lesions s/p D50 and initiation of D5LR. He is now admitted for further management.

## 2025-01-02 NOTE — ED ADULT TRIAGE NOTE - CHIEF COMPLAINT QUOTE
"I don't feel like myself" since flight home from [place] on 12/29, unable to explain symptoms further  denies pain

## 2025-01-02 NOTE — ED PROVIDER NOTE - PATIENT PORTAL LINK FT
You can access the FollowMyHealth Patient Portal offered by [facility] by registering at the following website: [email or web address]. By joining [employer]’s FollowMyHealth portal, you will also be able to view your health information using other applications (apps) compatible with our system.

## 2025-01-02 NOTE — ED ADULT NURSE NOTE - OBJECTIVE STATEMENT
43 y/o Male presents to ED from home with c/o not feeling well. No PMH. Pt states he just got home from a vacation to [place] and since coming home he felt not like himself. Endorses being very tired and sleeping alot. Denies SOB, CP, HA, fever, chills, cough, dizziness, abd pain. Pt is A&Ox3, well appearing/ speaking full sentences without difficulty. Airway patent with spontaneous unlabored breathing, Safety maintained

## 2025-01-02 NOTE — H&P ADULT - ATTENDING COMMENTS
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

## 2025-01-02 NOTE — H&P ADULT - NSHPLABSRESULTS_GEN_ALL_CORE
LABS: When present labs, imaging, and ECG were personally reviewed                          13.5   6.75  )-----------( 389      ( 02 Jan 2025 09:06 )             45.0       01-02    142  |  105  |  8   ----------------------------<  47[LL]  3.8   |  21[L]  |  0.67    Ca    8.9      02 Jan 2025 15:54  Mg     2.4     01-02    TPro  7.7  /  Alb  4.1  /  TBili  0.5  /  DBili  x   /  AST  25  /  ALT  28  /  AlkPhos  76  01-02       LIVER FUNCTIONS - ( 02 Jan 2025 09:06 )  Alb: 4.1 g/dL / Pro: 7.7 g/dL / ALK PHOS: 76 U/L / ALT: 28 U/L / AST: 25 U/L / GGT: x                    Urinalysis Basic - ( 02 Jan 2025 15:54 )    Color: x / Appearance: x / SG: x / pH: x  Gluc: 47 mg/dL / Ketone: x  / Bili: x / Urobili: x   Blood: x / Protein: x / Nitrite: x   Leuk Esterase: x / RBC: x / WBC x   Sq Epi: x / Non Sq Epi: x / Bacteria: x            Lactate Trend            CAPILLARY BLOOD GLUCOSE      POCT Blood Glucose.: 68 mg/dL (02 Jan 2025 21:09)            RADIOLOGY & ADDITIONAL TESTS:     IMPRESSION:  Lobulated 4.3 cm enhancing pancreatic tail mass, suspicious for a   neuroendocrine tumor given the patient's clinical history.    Additional 2.1 cm indeterminate hypoattenuating lesion in the pancreatic   body. Consider contrast enhanced MRI of the abdomen/MRCP for further   evaluation.

## 2025-01-02 NOTE — H&P ADULT - ASSESSMENT
44 M with pmhx of HTN comes to the ED after experiencing generalized weakness and confusion with labs significant for persistent hypoglycemia and CT A/P showing pancreatic lesion s/p initiation of dextrose fluids. He is now admitted for further management,

## 2025-01-02 NOTE — ED PROVIDER NOTE - CLINICAL SUMMARY MEDICAL DECISION MAKING FREE TEXT BOX
44 M [place] has diarrhea and is "not acting normally" pt had an episode when he was spraying water on the ceiling and didn't know what he was doing. Pt nontoxic appearing, neuro intact will have CTH, imaging and reassessment

## 2025-01-02 NOTE — H&P ADULT - TIME BILLING
Chart review , case discussion with ED and other providers , obtain  history,  examination of patient , answering questions and concerns , ordering labs and medications , and documentation

## 2025-01-02 NOTE — ED PROVIDER NOTE - PROGRESS NOTE DETAILS
Patient found to be hypoglycemic to 39.  Patient given apple juice with normalization of blood glucose.  Patient reports he is feeling much better and no longer confused.  Patient now able to provide additional history.  States while in [place] he developed severe nausea, vomiting and diarrhea as did many other people in his family.  Upon returning to [place] patient states he has had persistent symptoms and has been unable to eat.  Last episode of diarrhea was last night.  Patient has been able to tolerate p.o. while in the emergency department.  Currently feeling well without symptoms.  Plan to observe in the CDU overnight to monitor blood glucose.  Would consider endocrinology consult if patient becomes hypoglycemic. -Kaleb Conklin PA-C Dr. Faith:  Patient received in signout.  In short he is a 44-year-old male w recent gastroenteritis, no hx dm, coming in with AMS found to have hypoglycemia which has been recurrent during his stay.  CT showing possible lesion in the pancreas.  Is seen by endocrine.  Recommending sending off insulinoma screening labs and starting at dextrose containing infusion.  Plan for admission.

## 2025-01-02 NOTE — CONSULT NOTE ADULT - ASSESSMENT
Patient is a 44 year old male with past medical history of hypertension who presented to the hospital with hypoglycemia.  Endocrinology consulted for hypoglycemia.    #Hypoglycemia  - glucose 39 on initial BMP, 46 on most recent fingerstick  - Unclear if patient is meeting Whipple's triad at this time as he does not have classic hypoglycemia symptoms and has not been adequately treated to bring his glucose back up.   - CT abd/pel with PANCREAS: Lobulated enhancing mass at the pancreatic tail measuring 4.3 x 3.7 cm, with abutment of the left kidney at the posterior margin. Additional hypoattenuating lesion in the pancreatic body measuring 2.1 x 1.8 cm.  ADRENALS: Nonspecific mildly thickened bilateral adrenal glands.  - Most likely insulinoma given CT findings. DDx also includes infection, other causes of insulin-mediated hypoglycemia (i.e insulin antibodies), hypothyroidism, adrenal insufficiency, or medications  - As patient currently has low fingerstick at the time of my exam, recommend STAT labs: BMP, c-peptide, insulin, proinsulin, insulin antibodies, hypoglycemic (sulfonylurea) panel, and beta-hydroxybutyrate.   - Once labs are drawn, please start fluids with dextrose.   - continue to check FSG q4h for now  - hypoglycemia protocol    Pending discussion with attending physician.  INCOMPLETE NOTE  Patient is a 44 year old male with past medical history of hypertension who presented to the hospital with hypoglycemia.  Endocrinology consulted for hypoglycemia.    #Hypoglycemia  - glucose 39 on initial BMP, 46 on most recent fingerstick  - Unclear if patient is meeting Whipple's triad at this time as he does not have classic hypoglycemia symptoms and has not been adequately treated to bring his glucose back up.   - CT abd/pel with PANCREAS: Lobulated enhancing mass at the pancreatic tail measuring 4.3 x 3.7 cm, with abutment of the left kidney at the posterior margin. Additional hypoattenuating lesion in the pancreatic body measuring 2.1 x 1.8 cm.  ADRENALS: Nonspecific mildly thickened bilateral adrenal glands.  - Most likely insulinoma given CT findings. DDx also includes infection, other causes of insulin-mediated hypoglycemia (i.e insulin antibodies), hypothyroidism, adrenal insufficiency, or medications  - As patient currently has low fingerstick at the time of my exam, recommend STAT labs: BMP, c-peptide, insulin, proinsulin, insulin antibodies, hypoglycemic (sulfonylurea) panel, and beta-hydroxybutyrate.   - Once labs are drawn, please start fluids with dextrose.   - continue to check FSG q4h for now  - hypoglycemia protocol  - A1C in AM    Pending discussion with attending physician.  INCOMPLETE NOTE  Patient is a 44 year old male with past medical history of hypertension who presented to the hospital with hypoglycemia.  Endocrinology consulted for hypoglycemia.    #Hypoglycemia  - glucose 39 on initial BMP, 46 on most recent fingerstick  - Unclear if patient is meeting Whipple's triad at this time as he does not have classic hypoglycemia symptoms and has not been adequately treated to bring his glucose back up.   - CT abd/pel with PANCREAS: Lobulated enhancing mass at the pancreatic tail measuring 4.3 x 3.7 cm, with abutment of the left kidney at the posterior margin. Additional hypoattenuating lesion in the pancreatic body measuring 2.1 x 1.8 cm.  ADRENALS: Nonspecific mildly thickened bilateral adrenal glands.  - Most likely insulinoma given CT findings. DDx also includes infection, other causes of insulin-mediated hypoglycemia (i.e insulin antibodies), hypothyroidism, adrenal insufficiency, or medications  - As patient currently has low fingerstick at the time of my exam, recommend STAT labs: BMP, c-peptide, insulin, proinsulin, insulin antibodies, and beta-hydroxybutyrate. These labs were drawn today with a FS in the 40s.   - Once labs are drawn, please start fluids with dextrose.   - continue to check FSG q4h for now  - hypoglycemia protocol  - A1C in AM and sulfonyurea panel in AM for completeness. AM azeem as the lab sent during the hypoglycemic episode by the ED was a free cortisol which has a long turnaround time.    - Recommend MR pancreas with IV contrast  - Recommend surg onc consultation     Discussed with attending physician.  Kiran Hyde, DO   PGY-4 Endocrine Fellow  Can be reached via [employer] Teams.    For follow up questions, discharge recommendations or new consults, please email [email or web address] ([facility]) or [email or web address] ([facility]) or call the answering service at [phone] (weekdays); [phone] (nights/weekends).   For emergencies, please page fellow on call.  Patient is a 44 year old male with past medical history of hypertension who presented to the hospital with hypoglycemia.  Endocrinology consulted for hypoglycemia.    #Hypoglycemia  - glucose 39 on initial BMP, 46 on most recent fingerstick  - Unclear if patient is meeting Whipple's triad at this time as he does not have classic hypoglycemia symptoms and has not been adequately treated to bring his glucose back up.   - CT abd/pel with PANCREAS: Lobulated enhancing mass at the pancreatic tail measuring 4.3 x 3.7 cm, with abutment of the left kidney at the posterior margin. Additional hypoattenuating lesion in the pancreatic body measuring 2.1 x 1.8 cm.  ADRENALS: Nonspecific mildly thickened bilateral adrenal glands.  - Most likely insulinoma given CT findings. DDx also includes infection, other causes of insulin-mediated hypoglycemia (i.e insulin antibodies), hypothyroidism, adrenal insufficiency, or medications  - As patient currently has low fingerstick at the time of my exam, recommend STAT labs: BMP, c-peptide, insulin, proinsulin, insulin antibodies, and beta-hydroxybutyrate. These labs were drawn today with a FS in the 40s.   - Once labs are drawn, please start fluids with dextrose.   - continue to check FSG q4h for now  - hypoglycemia protocol  - A1C in AM and sulfonyurea panel in AM for completeness. AM azeem as the lab sent during the hypoglycemic episode by the ED was a free cortisol which has a long turnaround time.    - Recommend MR pancreas with IV contrast  - Recommend surg onc consultation. If this is confirmed to be insulinoma, first-line treatment is resection.    - Will get neuroendocrine tumor markers as baseline: chromogranin A, gastrin, VIP level    Discussed with attending physician.  Kiran Hyde DO   PGY-4 Endocrine Fellow  Can be reached via Microsoft Teams.    For follow up questions, discharge recommendations or new consults, please email [email or web address] ([email or web address] or [email or web address] ([facility]) or call the answering service at [phone] (weekdays); [phone] (nights/weekends).   For emergencies, please page fellow on call.

## 2025-01-02 NOTE — H&P ADULT - PROBLEM SELECTOR PLAN 1
patient admitted for persistent hypoglycemia   likely in setting of pancreatic lesion-> may be insulinoma vs VIPoma  s/p D50 and D5LR in ED    PLAN  monitor finger sticks q4  maintain hypoglycemia protocol  increase D5LR to D10NS as patient still hypolgycemic  appreciated Endocrine recs  follow up c-peptide, insulin, pro insulin, insulin anitbodies, tsh (specimen received)  pending collection chromogranin A, gastrin, VIP, 5HIAA, A1c  also obtain MR abdomen/MRCP for evaluation of pancreatic lesion  surg onc consult in AM

## 2025-01-03 LAB
A1C WITH ESTIMATED AVERAGE GLUCOSE RESULT: 4.9 % — SIGNIFICANT CHANGE UP (ref 4–5.6)
ADD ON TEST-SPECIMEN IN LAB: SIGNIFICANT CHANGE UP
ALBUMIN SERPL ELPH-MCNC: 4 G/DL — SIGNIFICANT CHANGE UP (ref 3.3–5)
ALP SERPL-CCNC: 70 U/L — SIGNIFICANT CHANGE UP (ref 40–120)
ALT FLD-CCNC: 24 U/L — SIGNIFICANT CHANGE UP (ref 10–45)
ANION GAP SERPL CALC-SCNC: 15 MMOL/L — SIGNIFICANT CHANGE UP (ref 5–17)
APTT BLD: 21.3 SEC — LOW (ref 24.5–35.6)
AST SERPL-CCNC: 17 U/L — SIGNIFICANT CHANGE UP (ref 10–40)
BASOPHILS # BLD AUTO: 0.03 K/UL — SIGNIFICANT CHANGE UP (ref 0–0.2)
BASOPHILS NFR BLD AUTO: 0.5 % — SIGNIFICANT CHANGE UP (ref 0–2)
BILIRUB SERPL-MCNC: 0.4 MG/DL — SIGNIFICANT CHANGE UP (ref 0.2–1.2)
BUN SERPL-MCNC: 8 MG/DL — SIGNIFICANT CHANGE UP (ref 7–23)
C PEPTIDE SERPL-MCNC: 5.6 NG/ML — HIGH (ref 1.1–4.4)
CALCIUM SERPL-MCNC: 8.2 MG/DL — LOW (ref 8.4–10.5)
CHLORIDE SERPL-SCNC: 106 MMOL/L — SIGNIFICANT CHANGE UP (ref 96–108)
CO2 SERPL-SCNC: 23 MMOL/L — SIGNIFICANT CHANGE UP (ref 22–31)
CREAT SERPL-MCNC: 0.72 MG/DL — SIGNIFICANT CHANGE UP (ref 0.5–1.3)
CULTURE RESULTS: SIGNIFICANT CHANGE UP
EGFR: 116 ML/MIN/1.73M2 — SIGNIFICANT CHANGE UP
EOSINOPHIL # BLD AUTO: 0.23 K/UL — SIGNIFICANT CHANGE UP (ref 0–0.5)
EOSINOPHIL NFR BLD AUTO: 3.6 % — SIGNIFICANT CHANGE UP (ref 0–6)
ESTIMATED AVERAGE GLUCOSE: 94 MG/DL — SIGNIFICANT CHANGE UP (ref 68–114)
GAS PNL BLDV: SIGNIFICANT CHANGE UP
GLUCOSE BLDC GLUCOMTR-MCNC: 100 MG/DL — HIGH (ref 70–99)
GLUCOSE BLDC GLUCOMTR-MCNC: 101 MG/DL — HIGH (ref 70–99)
GLUCOSE BLDC GLUCOMTR-MCNC: 102 MG/DL — HIGH (ref 70–99)
GLUCOSE BLDC GLUCOMTR-MCNC: 103 MG/DL — HIGH (ref 70–99)
GLUCOSE BLDC GLUCOMTR-MCNC: 112 MG/DL — HIGH (ref 70–99)
GLUCOSE BLDC GLUCOMTR-MCNC: 113 MG/DL — HIGH (ref 70–99)
GLUCOSE BLDC GLUCOMTR-MCNC: 62 MG/DL — LOW (ref 70–99)
GLUCOSE BLDC GLUCOMTR-MCNC: 65 MG/DL — LOW (ref 70–99)
GLUCOSE BLDC GLUCOMTR-MCNC: 70 MG/DL — SIGNIFICANT CHANGE UP (ref 70–99)
GLUCOSE BLDC GLUCOMTR-MCNC: 71 MG/DL — SIGNIFICANT CHANGE UP (ref 70–99)
GLUCOSE BLDC GLUCOMTR-MCNC: 72 MG/DL — SIGNIFICANT CHANGE UP (ref 70–99)
GLUCOSE BLDC GLUCOMTR-MCNC: 73 MG/DL — SIGNIFICANT CHANGE UP (ref 70–99)
GLUCOSE BLDC GLUCOMTR-MCNC: 74 MG/DL — SIGNIFICANT CHANGE UP (ref 70–99)
GLUCOSE BLDC GLUCOMTR-MCNC: 76 MG/DL — SIGNIFICANT CHANGE UP (ref 70–99)
GLUCOSE BLDC GLUCOMTR-MCNC: 77 MG/DL — SIGNIFICANT CHANGE UP (ref 70–99)
GLUCOSE BLDC GLUCOMTR-MCNC: 77 MG/DL — SIGNIFICANT CHANGE UP (ref 70–99)
GLUCOSE BLDC GLUCOMTR-MCNC: 84 MG/DL — SIGNIFICANT CHANGE UP (ref 70–99)
GLUCOSE BLDC GLUCOMTR-MCNC: 87 MG/DL — SIGNIFICANT CHANGE UP (ref 70–99)
GLUCOSE BLDC GLUCOMTR-MCNC: 87 MG/DL — SIGNIFICANT CHANGE UP (ref 70–99)
GLUCOSE BLDC GLUCOMTR-MCNC: 88 MG/DL — SIGNIFICANT CHANGE UP (ref 70–99)
GLUCOSE BLDC GLUCOMTR-MCNC: 97 MG/DL — SIGNIFICANT CHANGE UP (ref 70–99)
GLUCOSE BLDC GLUCOMTR-MCNC: 98 MG/DL — SIGNIFICANT CHANGE UP (ref 70–99)
GLUCOSE SERPL-MCNC: 83 MG/DL — SIGNIFICANT CHANGE UP (ref 70–99)
HCT VFR BLD CALC: 45.1 % — SIGNIFICANT CHANGE UP (ref 39–50)
HGB BLD-MCNC: 13.3 G/DL — SIGNIFICANT CHANGE UP (ref 13–17)
IMM GRANULOCYTES NFR BLD AUTO: 0.2 % — SIGNIFICANT CHANGE UP (ref 0–0.9)
LYMPHOCYTES # BLD AUTO: 1.64 K/UL — SIGNIFICANT CHANGE UP (ref 1–3.3)
LYMPHOCYTES # BLD AUTO: 25.4 % — SIGNIFICANT CHANGE UP (ref 13–44)
MAGNESIUM SERPL-MCNC: 2.4 MG/DL — SIGNIFICANT CHANGE UP (ref 1.6–2.6)
MCHC RBC-ENTMCNC: 20 PG — LOW (ref 27–34)
MCHC RBC-ENTMCNC: 29.5 G/DL — LOW (ref 32–36)
MCV RBC AUTO: 67.9 FL — LOW (ref 80–100)
MONOCYTES # BLD AUTO: 0.71 K/UL — SIGNIFICANT CHANGE UP (ref 0–0.9)
MONOCYTES NFR BLD AUTO: 11 % — SIGNIFICANT CHANGE UP (ref 2–14)
NEUTROPHILS # BLD AUTO: 3.83 K/UL — SIGNIFICANT CHANGE UP (ref 1.8–7.4)
NEUTROPHILS NFR BLD AUTO: 59.3 % — SIGNIFICANT CHANGE UP (ref 43–77)
NRBC # BLD: 0 /100 WBCS — SIGNIFICANT CHANGE UP (ref 0–0)
PHOSPHATE SERPL-MCNC: 2.8 MG/DL — SIGNIFICANT CHANGE UP (ref 2.5–4.5)
PLATELET # BLD AUTO: 334 K/UL — SIGNIFICANT CHANGE UP (ref 150–400)
POTASSIUM SERPL-MCNC: 3.4 MMOL/L — LOW (ref 3.5–5.3)
POTASSIUM SERPL-SCNC: 3.4 MMOL/L — LOW (ref 3.5–5.3)
PROT SERPL-MCNC: 7.6 G/DL — SIGNIFICANT CHANGE UP (ref 6–8.3)
RBC # BLD: 6.64 M/UL — HIGH (ref 4.2–5.8)
RBC # FLD: 18 % — HIGH (ref 10.3–14.5)
SODIUM SERPL-SCNC: 144 MMOL/L — SIGNIFICANT CHANGE UP (ref 135–145)
SPECIMEN SOURCE: SIGNIFICANT CHANGE UP
WBC # BLD: 6.45 K/UL — SIGNIFICANT CHANGE UP (ref 3.8–10.5)
WBC # FLD AUTO: 6.45 K/UL — SIGNIFICANT CHANGE UP (ref 3.8–10.5)

## 2025-01-03 PROCEDURE — 74183 MRI ABD W/O CNTR FLWD CNTR: CPT | Mod: 26

## 2025-01-03 PROCEDURE — 99254 IP/OBS CNSLTJ NEW/EST MOD 60: CPT

## 2025-01-03 PROCEDURE — 99291 CRITICAL CARE FIRST HOUR: CPT | Mod: GC

## 2025-01-03 PROCEDURE — 99233 SBSQ HOSP IP/OBS HIGH 50: CPT

## 2025-01-03 RX ORDER — DEXTROSE MONOHYDRATE 25 G/50ML
25 INJECTION, SOLUTION INTRAVENOUS ONCE
Refills: 0 | Status: COMPLETED | OUTPATIENT
Start: 2025-01-03 | End: 2025-01-03

## 2025-01-03 RX ORDER — CHLORHEXIDINE GLUCONATE 1.2 MG/ML
1 RINSE ORAL
Refills: 0 | Status: DISCONTINUED | OUTPATIENT
Start: 2025-01-03 | End: 2025-01-10

## 2025-01-03 RX ORDER — DEXTROSE MONOHYDRATE 100 MG/ML
1000 INJECTION, SOLUTION INTRAVENOUS
Refills: 0 | Status: DISCONTINUED | OUTPATIENT
Start: 2025-01-03 | End: 2025-01-06

## 2025-01-03 RX ORDER — POTASSIUM CHLORIDE 600 MG/1
40 TABLET, FILM COATED, EXTENDED RELEASE ORAL ONCE
Refills: 0 | Status: COMPLETED | OUTPATIENT
Start: 2025-01-03 | End: 2025-01-03

## 2025-01-03 RX ORDER — SODIUM CHLORIDE 9 MG/ML
1000 INJECTION, SOLUTION INTRAVENOUS
Refills: 0 | Status: DISCONTINUED | OUTPATIENT
Start: 2025-01-03 | End: 2025-01-09

## 2025-01-03 RX ORDER — DEXTROSE MONOHYDRATE 25 G/50ML
12.5 INJECTION, SOLUTION INTRAVENOUS ONCE
Refills: 0 | Status: COMPLETED | OUTPATIENT
Start: 2025-01-03 | End: 2025-01-03

## 2025-01-03 RX ORDER — DEXTROSE MONOHYDRATE 25 G/50ML
50 INJECTION, SOLUTION INTRAVENOUS ONCE
Refills: 0 | Status: COMPLETED | OUTPATIENT
Start: 2025-01-03 | End: 2025-01-03

## 2025-01-03 RX ADMIN — DEXTROSE MONOHYDRATE 25 GRAM(S): 25 INJECTION, SOLUTION INTRAVENOUS at 03:44

## 2025-01-03 RX ADMIN — SODIUM CHLORIDE 100 MILLILITER(S): 9 INJECTION, SOLUTION INTRAVENOUS at 06:19

## 2025-01-03 RX ADMIN — DEXTROSE MONOHYDRATE 25 GRAM(S): 25 INJECTION, SOLUTION INTRAVENOUS at 02:12

## 2025-01-03 RX ADMIN — DEXTROSE MONOHYDRATE 100 MILLILITER(S): 100 INJECTION, SOLUTION INTRAVENOUS at 18:30

## 2025-01-03 RX ADMIN — POTASSIUM CHLORIDE 40 MILLIEQUIVALENT(S): 600 TABLET, FILM COATED, EXTENDED RELEASE ORAL at 07:46

## 2025-01-03 RX ADMIN — ENOXAPARIN SODIUM 40 MILLIGRAM(S): 60 INJECTION INTRAVENOUS; SUBCUTANEOUS at 14:24

## 2025-01-03 RX ADMIN — DEXTROSE MONOHYDRATE 12.5 GRAM(S): 25 INJECTION, SOLUTION INTRAVENOUS at 00:44

## 2025-01-03 RX ADMIN — DEXTROSE MONOHYDRATE 25 GRAM(S): 25 INJECTION, SOLUTION INTRAVENOUS at 15:15

## 2025-01-03 RX ADMIN — SODIUM CHLORIDE 100 MILLILITER(S): 9 INJECTION, SOLUTION INTRAVENOUS at 01:15

## 2025-01-03 RX ADMIN — DEXTROSE MONOHYDRATE 50 MILLILITER(S): 25 INJECTION, SOLUTION INTRAVENOUS at 14:15

## 2025-01-03 RX ADMIN — Medication 150 MILLIGRAM(S): at 18:50

## 2025-01-03 RX ADMIN — DEXTROSE MONOHYDRATE 100 MILLILITER(S): 100 INJECTION, SOLUTION INTRAVENOUS at 07:39

## 2025-01-03 RX ADMIN — SODIUM CHLORIDE 100 MILLILITER(S): 9 INJECTION, SOLUTION INTRAVENOUS at 06:18

## 2025-01-03 RX ADMIN — Medication 7.5 MILLIGRAM(S): at 06:18

## 2025-01-03 RX ADMIN — CHLORHEXIDINE GLUCONATE 1 APPLICATION(S): 1.2 RINSE ORAL at 06:19

## 2025-01-03 RX ADMIN — SODIUM CHLORIDE 100 MILLILITER(S): 9 INJECTION, SOLUTION INTRAVENOUS at 11:33

## 2025-01-03 NOTE — PROGRESS NOTE ADULT - ASSESSMENT
44 M with pmhx of HTN who initially presented after experiencing generalized weakness and confusion who was admitted to MICU for significant and persistent hypoglycemia    Neuro:  - AOX 3, mental status at baseline. Initially lethargic 2/2 to hypoglycemia   CTH showed no acute intracranial hemorrhage, mass effect, or midline shift.    Cardiovascular  # Hypertension  - at home on amlodipine 7.5mg daily   - can continue for now     Respiratory  # non-labored breathing, satting 95% on room air   - no active issues     GI/Nutrition  # Diarrhea   - had 3-4 days of watery diarrhea in [place]  - other close contacts had similar symptoms, has resolved  - likely viral gastroenteritis, less likely VIPoma given self resolving nature of diarrhea and close contacts with similar symptoms   - can consider GI PCR if diarrhea recurs     #Nutrition  - DASH, TLC    /Renal  - no active issues     ID  - patient afebrile, no leukocytosis   - f/u urine cultures     Endocrine  #Hypoglycemia   - given CT abdomen and pelvis showing lesions in pancreatic tail along with persistent hypoglycemia c/f Insulinoma  - continue with q1 FS for now   - continue with 2 IVs running D10 at 100cc/hr, if needed will consider D20 drip   - f/u hypoglycemic labs   - endocrine following, recs appreciated      Hematology   #DVT ppx   Lovenox   Oncology  #pancreatic tail mass  - CT Abdomen and pelvis showing Lobulated 4.3 cm enhancing pancreatic tail mass, suspicious for a neuroendocrine tumor given the patient's clinical history. Additional 2.1 cm indeterminate hypoattenuating lesion in the pancreatic body.   - MR abdomen and MRCP ordered for further Consider contrast enhanced MRI of the abdomen/MRCP for further evaluation.  - f/u gastrin, vasoactive intestinal peptide, chromogranin A, and c-peptide levels

## 2025-01-03 NOTE — CONSULT NOTE ADULT - ASSESSMENT
44 M with pmhx of HTN who initially presented after experiencing generalized weakness and confusion who was admitted to MICU for significant and persistent hypoglycemia. CT showing 4x3cm lesion in tail of pancreas and 2x2cm lesion in body. Patient with persistent hypoglycemia despite D10 administration. Patient also with elevated c-peptide of 5.6. 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    Plan:   - Patient with persistent hypoglycemia, elevated c-peptide, and pancreatic lesion  - Patient receiving MRI today, will FU results   - Surgical oncology to follow     [facility] Surgery, [id number]  44 M with pmhx of HTN who initially presented after experiencing generalized weakness and confusion who was admitted to MICU for significant and persistent hypoglycemia. CT showing 4x3cm lesion in tail of pancreas and 2x2cm lesion in body. Patient with persistent hypoglycemia despite D10 administration. Patient also with elevated c-peptide of 5.6. 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    Plan:   - Patient with persistent hypoglycemia, elevated c-peptide, and pancreatic lesion  - Patient receiving MRI today, will FU results   - Please obtain GI consult, patient will need EUA to assess pancreatic body lesion   - Surgical oncology to follow     [facility] Surgery, [id number]

## 2025-01-03 NOTE — PROVIDER CONTACT NOTE (HYPOGLYCEMIA EVENT) - NS PROVIDER CONTACT BACKGROUND-HYPO
Age: 44y    Gender: Male    POCT Blood Glucose:  71 mg/dL (01-03-25 @ 03:14)  98 mg/dL (01-03-25 @ 02:45)  84 mg/dL (01-03-25 @ 01:48)  87 mg/dL (01-03-25 @ 01:15)  70 mg/dL (01-03-25 @ 00:23)  89 mg/dL (01-02-25 @ 23:45)  65 mg/dL (01-02-25 @ 22:20)  68 mg/dL (01-02-25 @ 21:09)      eMAR:dextrose 50% Injectable   25 milliLiter(s) IV Push (01-02-25 @ 16:34)    dextrose 50% Injectable   12.5 Gram(s) IV Push (01-03-25 @ 00:44)    dextrose 50% Injectable   25 Gram(s) IV Push (01-03-25 @ 02:12)    dextrose 50% Injectable   25 Gram(s) IV Push (01-03-25 @ 03:44)    dextrose 50% Injectable   12.5 Gram(s) IV Push (01-02-25 @ 23:13)

## 2025-01-03 NOTE — PROGRESS NOTE ADULT - ASSESSMENT
Patient is a 44 year old male with past medical history of hypertension who presented to the hospital with hypoglycemia.  Endocrinology consulted for hypoglycemia.    #Hypoglycemia  - glucose 39 on initial BMP, 46 on most recent fingerstick  - Unclear if patient is meeting Whipple's triad at this time as he does not have classic hypoglycemia symptoms and has not been adequately treated to bring his glucose back up.   - CT abd/pel with PANCREAS: Lobulated enhancing mass at the pancreatic tail measuring 4.3 x 3.7 cm, with abutment of the left kidney at the posterior margin. Additional hypoattenuating lesion in the pancreatic body measuring 2.1 x 1.8 cm.  ADRENALS: Nonspecific mildly thickened bilateral adrenal glands.  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 DDx also includes other causes of insulin-mediated hypoglycemia (i.e insulin antibodies), adrenal insufficiency, or medications  PLAN:  - continue D10, he has D10 100cc/hr and D10+NS 100cc/hr  - continue to check FSG q4h for now  - hypoglycemia protocol  - follow up sulfonylurea panel, insulin antibodies. Please check serum AM cortisol at 8am tomorrow 1/4/24 for completeness.  - follow up neuroendocrine tumor markers as baseline: chromogranin A, gastrin, VIP level  - Recommend MR pancreas with IV contrast  - Recommend surg onc consultation. If this is confirmed to be insulinoma, first-line treatment is resection.      Endy Serrato MD  Endocrine Fellow  Can be reached via teams. For follow up questions, discharge recommendations, or new consults, please call answering service at [phone] (weekdays); [phone] (nights/weekends). Patient is a 44 year old male with past medical history of hypertension who presented to the hospital with hypoglycemia.  Endocrinology consulted for hypoglycemia.    #Hypoglycemia  - glucose 39 on initial BMP, 46 on most recent fingerstick  - Unclear if patient is meeting Whipple's triad at this time as he does not have classic hypoglycemia symptoms and has not been adequately treated to bring his glucose back up.   - CT abd/pel with PANCREAS: Lobulated enhancing mass at the pancreatic tail measuring 4.3 x 3.7 cm, with abutment of the left kidney at the posterior margin. Additional hypoattenuating lesion in the pancreatic body measuring 2.1 x 1.8 cm.  ADRENALS: Nonspecific mildly thickened bilateral adrenal glands.  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 DDx also includes other causes of insulin-mediated hypoglycemia (i.e insulin antibodies), adrenal insufficiency, or medications  PLAN:  - Start diazoxide 150mg q8h  - continue D10, he has D10 100cc/hr and D10+NS 100cc/hr  - continue to check FSG q4h for now  - hypoglycemia protocol  - Check CT chest with IV contrast to evaluate for mets  - follow up sulfonylurea panel, insulin antibodies. Please check serum AM cortisol at 8am tomorrow 1/4/24 for completeness.  - follow up neuroendocrine tumor markers as baseline: chromogranin A, gastrin, VIP level  - Recommend MR abdomen, MRCP with IV contrast  - Recommend surg onc consultation. If this is confirmed to be insulinoma, first-line treatment is resection.      Discussed plan with Dr. Eddie Serrato MD  Endocrine Fellow  Can be reached via teams. For follow up questions, discharge recommendations, or new consults, please call answering service at [phone] (weekdays); [phone] (nights/weekends). Patient is a 44 year old male with past medical history of hypertension who presented to the hospital with hypoglycemia.  Endocrinology consulted for hypoglycemia.    #Hypoglycemia  - glucose 39 on initial BMP, 46 on most recent fingerstick  - Unclear if patient is meeting Whipple's triad at this time as he does not have classic hypoglycemia symptoms and has not been adequately treated to bring his glucose back up.   - CT abd/pel with PANCREAS: Lobulated enhancing mass at the pancreatic tail measuring 4.3 x 3.7 cm, with abutment of the left kidney at the posterior margin. Additional hypoattenuating lesion in the pancreatic body measuring 2.1 x 1.8 cm.  ADRENALS: Nonspecific mildly thickened bilateral adrenal glands.  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 DDx also includes other causes of insulin-mediated hypoglycemia (i.e insulin antibodies), adrenal insufficiency, or medications  PLAN:  - Start diazoxide 150mg q8h  - continue D10, he has D10 100cc/hr and D10+NS 100cc/hr  - continue to check FSG q4h for now  - hypoglycemia protocol  - Check CT chest with IV contrast to evaluate for mets  - follow up sulfonylurea panel, insulin antibodies. Please check serum AM cortisol at 8am tomorrow 1/4/24 for completeness.  - follow up neuroendocrine tumor markers as baseline: chromogranin A, gastrin, VIP level  - Recommend MR abdomen, MRCP with IV contrast  - Recommend surg onc consultation. If this is confirmed to be insulinoma, first-line treatment is resection.    - Outpatient genetic testing      Discussed plan with Dr. Eddie Serrato MD  Endocrine Fellow  Can be reached via teams. For follow up questions, discharge recommendations, or new consults, please call answering service at [phone] (weekdays); [phone] (nights/weekends). Patient is a 44 year old male with past medical history of hypertension who presented to the hospital with hypoglycemia.  Endocrinology consulted for hypoglycemia.    #Hypoglycemia  - glucose 39 on initial BMP, 46 on most recent fingerstick  - Unclear if patient is meeting Whipple's triad at this time as he does not have classic hypoglycemia symptoms and has not been adequately treated to bring his glucose back up.   - CT abd/pel with PANCREAS: Lobulated enhancing mass at the pancreatic tail measuring 4.3 x 3.7 cm, with abutment of the left kidney at the posterior margin. Additional hypoattenuating lesion in the pancreatic body measuring 2.1 x 1.8 cm.  ADRENALS: Nonspecific mildly thickened bilateral adrenal glands.  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 DDx also includes other causes of insulin-mediated hypoglycemia (i.e insulin antibodies), adrenal insufficiency, or medications  PLAN:  - Start diazoxide 150mg q8h  - continue D10, he has D10 100cc/hr and D10+NS 100cc/hr  - continue to check FSG q4h for now  - hypoglycemia protocol  - Check CT chest with IV contrast to evaluate for mets  - follow up sulfonylurea panel, insulin antibodies. Please check serum AM cortisol at 8am tomorrow 1/4/24 for completeness.  - follow up neuroendocrine tumor markers as baseline: chromogranin A, gastrin, VIP level  - Recommend MR abdomen, MRCP with IV contrast  - Recommend surg onc consultation. If this is confirmed to be insulinoma, first-line treatment is resection.    - Outpatient genetic testing    #HTN  Management per primary team, currently on amlodipine 7.5 mg qd    Discussed plan with Dr. Eddie Serrato MD  Endocrine Fellow  Can be reached via teams. For follow up questions, discharge recommendations, or new consults, please call answering service at [phone] (weekdays); [phone] (nights/weekends).

## 2025-01-03 NOTE — CONSULT NOTE ADULT - SUBJECTIVE AND OBJECTIVE BOX
Surgery Consult Note  Attending: MD Chuck  Service: Surgical Oncology     HPI: 44 M with pmhx of HTN who initially presented after experiencing generalized weakness and confusion who was admitted to MICU for significant and persistent hypoglycemia. CT showing 4x3cm lesion in tail of pancreas and 2x2cm lesion in body. Patient with persistent hypoglycemia despite D10 administration. Patient also with elevated c-peptide of 5.6. 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       PAST MEDICAL HISTORY:  PAST MEDICAL & SURGICAL HISTORY:  HTN (hypertension)          ALLERGIES:  Allergies    No Known Allergies    Intolerances        SOCIAL HISTORY: Negative unless reported above     FAMILY HISTORY:  FAMILY HISTORY:      PHYSICAL EXAM:  General: NAD, resting comfortably  HEENT: NC/AT  Pulmonary: normal resp effort  Cardiovascular: NSR  Abdominal: soft, ND, NT  Extremities: WWP  Neuro: A/O x 3, no obvious focal deficits     VITAL SIGNS:  Vital Signs Last 24 Hrs  T(C): 36.9 (03 Jan 2025 15:00), Max: 36.9 (02 Jan 2025 19:35)  T(F): 98.4 (03 Jan 2025 15:00), Max: 98.4 (02 Jan 2025 19:35)  HR: 87 (03 Jan 2025 19:00) (77 - 92)  BP: 170/101 (03 Jan 2025 18:35) (142/87 - 173/84)  BP(mean): 129 (03 Jan 2025 18:35) (104 - 129)  RR: 20 (03 Jan 2025 19:00) (13 - 25)  SpO2: 96% (03 Jan 2025 19:00) (76% - 97%)    Parameters below as of 03 Jan 2025 05:49  Patient On (Oxygen Delivery Method): room air        I&O's Summary    02 Jan 2025 07:01  -  03 Jan 2025 07:00  --------------------------------------------------------  IN: 620 mL / OUT: 600 mL / NET: 20 mL    03 Jan 2025 07:01  -  03 Jan 2025 19:08  --------------------------------------------------------  IN: 2861 mL / OUT: 2600 mL / NET: 261 mL        LABS:                        13.3   6.45  )-----------( 334      ( 03 Jan 2025 06:20 )             45.1     01-03    144  |  106  |  8   ----------------------------<  83  3.4[L]   |  23  |  0.72    Ca    8.2[L]      03 Jan 2025 06:20  Phos  2.8     01-03  Mg     2.4     01-03    TPro  7.6  /  Alb  4.0  /  TBili  0.4  /  DBili  x   /  AST  17  /  ALT  24  /  AlkPhos  70  01-03    PTT - ( 03 Jan 2025 06:20 )  PTT:21.3 sec  Urinalysis Basic - ( 03 Jan 2025 06:20 )    Color: x / Appearance: x / SG: x / pH: x  Gluc: 83 mg/dL / Ketone: x  / Bili: x / Urobili: x   Blood: x / Protein: x / Nitrite: x   Leuk Esterase: x / RBC: x / WBC x   Sq Epi: x / Non Sq Epi: x / Bacteria: x      CAPILLARY BLOOD GLUCOSE      POCT Blood Glucose.: 100 mg/dL (03 Jan 2025 18:26)  POCT Blood Glucose.: 87 mg/dL (03 Jan 2025 16:19)  POCT Blood Glucose.: 101 mg/dL (03 Jan 2025 15:43)  POCT Blood Glucose.: 62 mg/dL (03 Jan 2025 15:06)  POCT Blood Glucose.: 103 mg/dL (03 Jan 2025 14:38)  POCT Blood Glucose.: 65 mg/dL (03 Jan 2025 14:08)  POCT Blood Glucose.: 72 mg/dL (03 Jan 2025 13:11)  POCT Blood Glucose.: 88 mg/dL (03 Jan 2025 12:06)  POCT Blood Glucose.: 73 mg/dL (03 Jan 2025 11:05)  POCT Blood Glucose.: 112 mg/dL (03 Jan 2025 10:01)  POCT Blood Glucose.: 88 mg/dL (03 Jan 2025 09:08)  POCT Blood Glucose.: 88 mg/dL (03 Jan 2025 08:06)  POCT Blood Glucose.: 102 mg/dL (03 Jan 2025 06:53)  POCT Blood Glucose.: 97 mg/dL (03 Jan 2025 05:53)  POCT Blood Glucose.: 71 mg/dL (03 Jan 2025 03:14)  POCT Blood Glucose.: 98 mg/dL (03 Jan 2025 02:45)  POCT Blood Glucose.: 84 mg/dL (03 Jan 2025 01:48)  POCT Blood Glucose.: 87 mg/dL (03 Jan 2025 01:15)  POCT Blood Glucose.: 70 mg/dL (03 Jan 2025 00:23)  POCT Blood Glucose.: 89 mg/dL (02 Jan 2025 23:45)  POCT Blood Glucose.: 65 mg/dL (02 Jan 2025 22:20)  POCT Blood Glucose.: 68 mg/dL (02 Jan 2025 21:09)  POCT Blood Glucose.: 68 mg/dL (02 Jan 2025 21:07)  POCT Blood Glucose.: 68 mg/dL (02 Jan 2025 19:41)  POCT Blood Glucose.: 66 mg/dL (02 Jan 2025 19:39)    LIVER FUNCTIONS - ( 03 Jan 2025 06:20 )  Alb: 4.0 g/dL / Pro: 7.6 g/dL / ALK PHOS: 70 U/L / ALT: 24 U/L / AST: 17 U/L / GGT: x

## 2025-01-03 NOTE — CHART NOTE - NSCHARTNOTEFT_GEN_A_CORE
MICU Accept Note    CHIEF COMPLAINT: hypoglycemia     HPI / INTERVAL HISTORY: 45 yo M with pmhx of HTN comes to the ED after experiencing confusion and weakness. Patient's symptoms started on 12/30 while he was away in [place]. He was experiencing 4 episodes of diarrhea at the time. However, he attributed his symptoms to gastroenteritis as his family members were experiencing similar symptoms. He returned to [place] and was found to be experiencing confusion as he was spraying the ceiling with water. He went to urgent care and was then recommended to come to the hospital. He has never experienced symptoms like this prior. His ROS otherwise is notable for a history of night sweats and he is unsure if he has experienced unintentional weight loss. Today, he is feeling better as his confusion and weakness has improved. His last episode of diarrhea was also the day prior to coming to the hospital.     In the ED, his vitals were wnl and his labs were significant for persistent hypoglycemia and hypoglycemia. A CT A/P was also obtained that showed pancreatic lesions s/p D50 and initiation of D5LR. He is now admitted for further management.     Once admitted patient was persistently hypoglycemic requiring 2x IVP of 12.5mg of D50, 2x IVP of 25mg D50, and had 2 peripheral IV's running D10 at 100cc/hr. MICU accepted patient for closer monitoring of blood sugars.      PAST MEDICAL & SURGICAL HISTORY:  HTN (hypertension)          FAMILY HISTORY: non-contributory       HOME MEDICATIONS:     Amlodipine 7.5mg     Allergies    No Known Allergies    Intolerances          REVIEW OF SYSTEMS:    CONSTITUTIONAL: No weakness, fevers or chills  EYES/ENT: No visual changes;  No vertigo or throat pain   NECK: No pain or stiffness  RESPIRATORY: No cough, wheezing, hemoptysis; No shortness of breath  CARDIOVASCULAR: No chest pain or palpitations  GASTROINTESTINAL: No abdominal or epigastric pain. No nausea, vomiting, or hematemesis; + diarrhea, no constipation. No melena or hematochezia.  GENITOURINARY: No dysuria, frequency or hematuria  NEUROLOGICAL: No numbness or weakness  All other review of systems is negative unless indicated above.    OBJECTIVE:  ICU Vital Signs Last 24 Hrs  T(C): 36.8 (03 Jan 2025 04:35), Max: 37 (02 Jan 2025 11:28)  T(F): 98.2 (03 Jan 2025 04:35), Max: 98.6 (02 Jan 2025 11:28)  HR: 84 (03 Jan 2025 04:35) (79 - 89)  BP: 149/96 (03 Jan 2025 04:35) (133/83 - 149/96)  BP(mean): --  ABP: --  ABP(mean): --  RR: 18 (03 Jan 2025 04:35) (16 - 18)  SpO2: 96% (03 Jan 2025 04:35) (94% - 100%)    O2 Parameters below as of 03 Jan 2025 04:35  Patient On (Oxygen Delivery Method): room air              01-02 @ 07:01  -  01-03 @ 05:32  --------------------------------------------------------  IN: 220 mL / OUT: 600 mL / NET: -380 mL      CAPILLARY BLOOD GLUCOSE      POCT Blood Glucose.: 71 mg/dL (03 Jan 2025 03:14)      PHYSICAL EXAM:  General: NAD, well developed  Head: atraumatic, normocephalic   Eyes: conjunctivae and sclera clear   ENMT: moist mucous membrane  Neck: supple, no JVD  Heart: regular rate and rhythm, normal s1,s2, no murmurs, rubs, or gallops   Lungs: clear lung fields bilaterally, no wheezes, rales, or rhonchi  Abdomen: soft, non-tender, non-distended, normoactive bowel sounds   Extremities: warm, well perfused, no lower extremity edema   Neuro: AOx3, no focal neurological deficits     LINES: Peripheral IVs      HOSPITAL MEDICATIONS:  MEDICATIONS  (STANDING):  amLODIPine   Tablet 7.5 milliGRAM(s) Oral daily  chlorhexidine 2% Cloths 1 Application(s) Topical <User Schedule>  dextrose 10% + sodium chloride 0.9%. 1000 milliLiter(s) (100 mL/Hr) IV Continuous <Continuous>  dextrose 10% + sodium chloride 0.9%. 1000 milliLiter(s) (100 mL/Hr) IV Continuous <Continuous>  enoxaparin Injectable 40 milliGRAM(s) SubCutaneous every 24 hours    MEDICATIONS  (PRN):      LABS:                        13.5   6.75  )-----------( 389      ( 02 Jan 2025 09:06 )             45.0     Hgb Trend: 13.5<--  01-02    142  |  105  |  8   ----------------------------<  47[LL]  3.8   |  21[L]  |  0.67    Ca    8.9      02 Jan 2025 15:54  Mg     2.4     01-02    TPro  7.7  /  Alb  4.1  /  TBili  0.5  /  DBili  x   /  AST  25  /  ALT  28  /  AlkPhos  76  01-02    Creatinine Trend: 0.67<--, 0.66<--    Urinalysis Basic - ( 02 Jan 2025 15:54 )    Color: x / Appearance: x / SG: x / pH: x  Gluc: 47 mg/dL / Ketone: x  / Bili: x / Urobili: x   Blood: x / Protein: x / Nitrite: x   Leuk Esterase: x / RBC: x / WBC x   Sq Epi: x / Non Sq Epi: x / Bacteria: x        Venous Blood Gas:  01-03 @ 04:56  7.37/49/56/28/89.4  VBG Lactate: 1.2      MICROBIOLOGY:     RADIOLOGY & ADDITIONAL TESTS:      ASSESSMENT AND PLAN:  44 M with pmhx of HTN who initially presented after experiencing generalized weakness and confusion who was admitted to MICU for significant and persistent hypoglycemia    Neuro:  - AOX 3, mental status at baseline. Initially lethargic 2/2 to hypoglycemia   CTH showed no acute intracranial hemorrhage, mass effect, or midline shift.    Cardiovascular  # Hypertension  - at home on amlodipine 7.5mg daily   - can continue for now     Respiratory  # non-labored breathing, satting 95% on room air   - no active issues     GI/Nutrition  # Diarrhea   - had 3-4 days of watery diarrhea in [place]  - other close contacts had similar symptoms, has resolved  - likely viral gastroenteritis, less likely VIPoma given self resolving nature of diarrhea and close contacts with similar symptoms   - can consider GI PCR if diarrhea recurs     #Nutrition  - DASH, TLC    /Renal  - no active issues     ID  - patient afebrile, no leukocytosis   - f/u urine cultures     Endocrine  #Hypoglycemia   - given CT abdomen and pelvis showing lesions in pancreatic tail along with persistent hypoglycemia c/f Insulinoma  - continue with q1 FS for now   - continue with 2 IVs running D10 at 100cc/hr, if needed will consider D20 drip   - f/u hypoglycemic labs   - endocrine following, recs appreciated      Hematology   #DVT ppx   Lovenox   Oncology  #pancreatic tail mass  - CT Abdomen and pelvis showing Lobulated 4.3 cm enhancing pancreatic tail mass, suspicious for a neuroendocrine tumor given the patient's clinical history. Additional 2.1 cm indeterminate hypoattenuating lesion in the pancreatic body.   - MR abdomen and MRCP ordered for further Consider contrast enhanced MRI of the abdomen/MRCP for further evaluation.  - f/u gastrin, vasoactive intestinal peptide, chromogranin A, and c-peptide levels     Ethics  Full Code

## 2025-01-03 NOTE — PROGRESS NOTE ADULT - SUBJECTIVE AND OBJECTIVE BOX
Admitted for: Hypoglycemia        Following for:    Subjective:       MEDICATIONS  (STANDING):  amLODIPine   Tablet 7.5 milliGRAM(s) Oral daily  chlorhexidine 2% Cloths 1 Application(s) Topical <User Schedule>  dextrose 10% + sodium chloride 0.9%. 1000 milliLiter(s) (100 mL/Hr) IV Continuous <Continuous>  dextrose 10%. 1000 milliLiter(s) (100 mL/Hr) IV Continuous <Continuous>  enoxaparin Injectable 40 milliGRAM(s) SubCutaneous every 24 hours    MEDICATIONS  (PRN):      Allergies    No Known Allergies    Intolerances          PHYSICAL EXAM:  VITALS: T(C): 36.9 (01-03-25 @ 08:00)  T(F): 98.4 (01-03-25 @ 08:00), Max: 98.4 (01-02-25 @ 19:35)  HR: 92 (01-03-25 @ 10:00) (77 - 92)  BP: 173/84 (01-03-25 @ 10:00) (142/87 - 173/84)  RR:  (13 - 21)  SpO2:  (94% - 97%)  Wt(kg): --  GENERAL: NAD  EYES: No proptosis, no lid lag, anicteric  RESPIRATORY: Clear to auscultation bilaterally  CARDIOVASCULAR: Regular rate and rhythm  GI: Soft, nontender, non distended  EXT: b/l feet without wounds, 2+ pulses  PSYCH: Alert and oriented x 3, reactive affect      A1C with Estimated Average Glucose Result: 4.9 % (01-03-25 @ 06:20)      POCT Blood Glucose.: 88 mg/dL (01-03-25 @ 12:06)  POCT Blood Glucose.: 73 mg/dL (01-03-25 @ 11:05)  POCT Blood Glucose.: 112 mg/dL (01-03-25 @ 10:01)  POCT Blood Glucose.: 88 mg/dL (01-03-25 @ 09:08)  POCT Blood Glucose.: 88 mg/dL (01-03-25 @ 08:06)  POCT Blood Glucose.: 102 mg/dL (01-03-25 @ 06:53)  POCT Blood Glucose.: 97 mg/dL (01-03-25 @ 05:53)  POCT Blood Glucose.: 71 mg/dL (01-03-25 @ 03:14)  POCT Blood Glucose.: 98 mg/dL (01-03-25 @ 02:45)  POCT Blood Glucose.: 84 mg/dL (01-03-25 @ 01:48)  POCT Blood Glucose.: 87 mg/dL (01-03-25 @ 01:15)  POCT Blood Glucose.: 70 mg/dL (01-03-25 @ 00:23)  POCT Blood Glucose.: 89 mg/dL (01-02-25 @ 23:45)  POCT Blood Glucose.: 65 mg/dL (01-02-25 @ 22:20)  POCT Blood Glucose.: 68 mg/dL (01-02-25 @ 21:09)  POCT Blood Glucose.: 68 mg/dL (01-02-25 @ 21:07)  POCT Blood Glucose.: 68 mg/dL (01-02-25 @ 19:41)  POCT Blood Glucose.: 66 mg/dL (01-02-25 @ 19:39)  POCT Blood Glucose.: 98 mg/dL (01-02-25 @ 18:01)  POCT Blood Glucose.: 100 mg/dL (01-02-25 @ 17:01)  POCT Blood Glucose.: 57 mg/dL (01-02-25 @ 16:29)  POCT Blood Glucose.: 53 mg/dL (01-02-25 @ 16:28)  POCT Blood Glucose.: 46 mg/dL (01-02-25 @ 15:45)  POCT Blood Glucose.: 43 mg/dL (01-02-25 @ 15:44)  POCT Blood Glucose.: 55 mg/dL (01-02-25 @ 14:55)  POCT Blood Glucose.: 85 mg/dL (01-02-25 @ 11:26)  POCT Blood Glucose.: 84 mg/dL (01-02-25 @ 10:49)  POCT Blood Glucose.: 51 mg/dL (01-02-25 @ 10:27)  POCT Blood Glucose.: 50 mg/dL (01-02-25 @ 10:26)      01-03    144  |  106  |  8   ----------------------------<  83  3.4[L]   |  23  |  0.72    eGFR: 116    Ca    8.2[L]      01-03  Mg     2.4     01-03  Phos  2.8     01-03    TPro  7.6  /  Alb  4.0  /  TBili  0.4  /  DBili  x   /  AST  17  /  ALT  24  /  AlkPhos  70  01-03      Thyroid Function Tests:  01-02 @ 15:54 TSH 1.60 FreeT4 -- T3 -- Anti TPO -- Anti Thyroglobulin Ab -- TSI --                         Admitted for: Hypoglycemia        Following for: hypoglycemia    Subjective:   Patient seen and examined in MICU. Reports fatigue.      MEDICATIONS  (STANDING):  amLODIPine   Tablet 7.5 milliGRAM(s) Oral daily  chlorhexidine 2% Cloths 1 Application(s) Topical <User Schedule>  dextrose 10% + sodium chloride 0.9%. 1000 milliLiter(s) (100 mL/Hr) IV Continuous <Continuous>  dextrose 10%. 1000 milliLiter(s) (100 mL/Hr) IV Continuous <Continuous>  enoxaparin Injectable 40 milliGRAM(s) SubCutaneous every 24 hours    MEDICATIONS  (PRN):      Allergies    No Known Allergies    Intolerances          PHYSICAL EXAM:  VITALS: T(C): 36.9 (01-03-25 @ 08:00)  T(F): 98.4 (01-03-25 @ 08:00), Max: 98.4 (01-02-25 @ 19:35)  HR: 92 (01-03-25 @ 10:00) (77 - 92)  BP: 173/84 (01-03-25 @ 10:00) (142/87 - 173/84)  RR:  (13 - 21)  SpO2:  (94% - 97%)  Wt(kg): --  GENERAL: NAD, well-groomed, well-developed, obese  EYES: No proptosis, no lid lag, anicteric  HEENT:  Atraumatic, Normocephalic, moist mucous membranes  RESPIRATORY: Normal respiratory effort; no audible wheezing  SKIN: Dry, intact, No rashes or lesions  MUSCULOSKELETAL: SAUCEDO  NEURO: extraocular movements intact, no tremor, slurring speech  PSYCH: Alert and oriented, normal affect, normal mood  CUSHING'S SIGNS: no striae      A1C with Estimated Average Glucose Result: 4.9 % (01-03-25 @ 06:20)      POCT Blood Glucose.: 88 mg/dL (01-03-25 @ 12:06)  POCT Blood Glucose.: 73 mg/dL (01-03-25 @ 11:05)  POCT Blood Glucose.: 112 mg/dL (01-03-25 @ 10:01)  POCT Blood Glucose.: 88 mg/dL (01-03-25 @ 09:08)  POCT Blood Glucose.: 88 mg/dL (01-03-25 @ 08:06)  POCT Blood Glucose.: 102 mg/dL (01-03-25 @ 06:53)  POCT Blood Glucose.: 97 mg/dL (01-03-25 @ 05:53)  POCT Blood Glucose.: 71 mg/dL (01-03-25 @ 03:14)  POCT Blood Glucose.: 98 mg/dL (01-03-25 @ 02:45)  POCT Blood Glucose.: 84 mg/dL (01-03-25 @ 01:48)  POCT Blood Glucose.: 87 mg/dL (01-03-25 @ 01:15)  POCT Blood Glucose.: 70 mg/dL (01-03-25 @ 00:23)  POCT Blood Glucose.: 89 mg/dL (01-02-25 @ 23:45)  POCT Blood Glucose.: 65 mg/dL (01-02-25 @ 22:20)  POCT Blood Glucose.: 68 mg/dL (01-02-25 @ 21:09)  POCT Blood Glucose.: 68 mg/dL (01-02-25 @ 21:07)  POCT Blood Glucose.: 68 mg/dL (01-02-25 @ 19:41)  POCT Blood Glucose.: 66 mg/dL (01-02-25 @ 19:39)  POCT Blood Glucose.: 98 mg/dL (01-02-25 @ 18:01)  POCT Blood Glucose.: 100 mg/dL (01-02-25 @ 17:01)  POCT Blood Glucose.: 57 mg/dL (01-02-25 @ 16:29)  POCT Blood Glucose.: 53 mg/dL (01-02-25 @ 16:28)  POCT Blood Glucose.: 46 mg/dL (01-02-25 @ 15:45)  POCT Blood Glucose.: 43 mg/dL (01-02-25 @ 15:44)  POCT Blood Glucose.: 55 mg/dL (01-02-25 @ 14:55)  POCT Blood Glucose.: 85 mg/dL (01-02-25 @ 11:26)  POCT Blood Glucose.: 84 mg/dL (01-02-25 @ 10:49)  POCT Blood Glucose.: 51 mg/dL (01-02-25 @ 10:27)  POCT Blood Glucose.: 50 mg/dL (01-02-25 @ 10:26)      01-03    144  |  106  |  8   ----------------------------<  83  3.4[L]   |  23  |  0.72    eGFR: 116    Ca    8.2[L]      01-03  Mg     2.4     01-03  Phos  2.8     01-03    TPro  7.6  /  Alb  4.0  /  TBili  0.4  /  DBili  x   /  AST  17  /  ALT  24  /  AlkPhos  70  01-03      Thyroid Function Tests:  01-02 @ 15:54 TSH 1.60 FreeT4 -- T3 -- Anti TPO -- Anti Thyroglobulin Ab -- TSI --

## 2025-01-03 NOTE — PROVIDER CONTACT NOTE (HYPOGLYCEMIA EVENT) - NS PROVIDER CONTACT NOTE-TREATMENT-HYPO
bilateral D10 NS running at 100mL/hr each/Dextrose 50% 12.5 Grams IV Push/Dextrose 50% 25 Grams IV Push/Other (Specify)

## 2025-01-03 NOTE — PATIENT PROFILE ADULT - FALL HARM RISK - HARM RISK INTERVENTIONS

## 2025-01-03 NOTE — PROGRESS NOTE ADULT - SUBJECTIVE AND OBJECTIVE BOX
MICU Progress Note    SUBJECTIVE  INTERVAL EVENTS:  - NAEON    OBJECTIVE  Physical Exam:   PHYSICAL EXAM:  GENERAL: No acute distress, well-developed  HEAD:  Atraumatic, Normocephalic  EYES: EOMI, PERRLA, conjunctiva and sclera clear  NECK: Supple, no lymphadenopathy, no JVD  CHEST/LUNG: CTAB; No wheezes, rales, or rhonchi  HEART: Regular rate and rhythm. Normal S1/S2. No murmurs, rubs, or gallops  ABDOMEN: Soft, non-tender, non-distended; normal bowel sounds, no organomegaly  EXTREMITIES:  2+ peripheral pulses b/l, No clubbing, cyanosis, or edema  NEUROLOGY: A&O x 3, no focal deficits  SKIN: No rashes or lesions    ICU Vital Signs Last 24 Hrs  T(F): 98.1 (03 Jan 2025 05:49), Max: 98.6 (02 Jan 2025 11:28)  HR: 77 (03 Jan 2025 07:00) (77 - 89)  BP: 169/74 (03 Jan 2025 07:00) (133/83 - 169/74)  BP(mean): 106 (03 Jan 2025 07:00) (106 - 115)  ABP: --  ABP(mean): --  RR: 15 (03 Jan 2025 07:00) (13 - 18)  SpO2: 94% (03 Jan 2025 07:00) (94% - 100%)    I&O's Summary    02 Jan 2025 07:01  -  03 Jan 2025 07:00  --------------------------------------------------------  IN: 620 mL / OUT: 600 mL / NET: 20 mL        ECMO Day#     Adult Advanced Hemodynamics Last 24 Hrs  CVP(mm Hg): --  CVP(cm H2O): --  CO: --  CI: --  PA: --  PA(mean): --  PCWP: --  SVR: --  SVRI: --  PVR: --  PVRI: --    ECMO SETTINGS:  Type:		[ ] Venovenous		[ ] Venoarterial  Cannulation Site(s):     Flow:  	        RPM: 		    Oxygenator:	Sweep:     L/min	FiO2:        LABS:                        13.3   6.45  )-----------( x        ( 03 Jan 2025 06:20 )             45.1     01-03    144  |  106  |  8   ----------------------------<  83  3.4[L]   |  23  |  0.72    Ca    8.2[L]      03 Jan 2025 06:20  Phos  2.8     01-03  Mg     2.4     01-03    TPro  7.6  /  Alb  4.0  /  TBili  0.4  /  DBili  x   /  AST  17  /  ALT  24  /  AlkPhos  70  01-03    PTT - ( 03 Jan 2025 06:20 )  PTT:21.3 sec      Venous: 01-03-25 @ 06:14 FiO2: -- Oxygen Sat% 67.6  Venous: 01-03-25 @ 04:56 FiO2: -- Oxygen Sat% 89.4    Urinalysis Basic - ( 03 Jan 2025 06:20 )    Color: x / Appearance: x / SG: x / pH: x  Gluc: 83 mg/dL / Ketone: x  / Bili: x / Urobili: x   Blood: x / Protein: x / Nitrite: x   Leuk Esterase: x / RBC: x / WBC x   Sq Epi: x / Non Sq Epi: x / Bacteria: x          CAPILLARY BLOOD GLUCOSE      POCT Blood Glucose.: 102 mg/dL (03 Jan 2025 06:53)  POCT Blood Glucose.: 97 mg/dL (03 Jan 2025 05:53)  POCT Blood Glucose.: 71 mg/dL (03 Jan 2025 03:14)  POCT Blood Glucose.: 98 mg/dL (03 Jan 2025 02:45)  POCT Blood Glucose.: 84 mg/dL (03 Jan 2025 01:48)  POCT Blood Glucose.: 87 mg/dL (03 Jan 2025 01:15)  POCT Blood Glucose.: 70 mg/dL (03 Jan 2025 00:23)  POCT Blood Glucose.: 89 mg/dL (02 Jan 2025 23:45)  POCT Blood Glucose.: 65 mg/dL (02 Jan 2025 22:20)  POCT Blood Glucose.: 68 mg/dL (02 Jan 2025 21:09)  POCT Blood Glucose.: 68 mg/dL (02 Jan 2025 21:07)  POCT Blood Glucose.: 68 mg/dL (02 Jan 2025 19:41)  POCT Blood Glucose.: 66 mg/dL (02 Jan 2025 19:39)  POCT Blood Glucose.: 98 mg/dL (02 Jan 2025 18:01)  POCT Blood Glucose.: 100 mg/dL (02 Jan 2025 17:01)  POCT Blood Glucose.: 57 mg/dL (02 Jan 2025 16:29)  POCT Blood Glucose.: 53 mg/dL (02 Jan 2025 16:28)  POCT Blood Glucose.: 46 mg/dL (02 Jan 2025 15:45)  POCT Blood Glucose.: 43 mg/dL (02 Jan 2025 15:44)  POCT Blood Glucose.: 55 mg/dL (02 Jan 2025 14:55)  POCT Blood Glucose.: 85 mg/dL (02 Jan 2025 11:26)  POCT Blood Glucose.: 84 mg/dL (02 Jan 2025 10:49)  POCT Blood Glucose.: 51 mg/dL (02 Jan 2025 10:27)  POCT Blood Glucose.: 50 mg/dL (02 Jan 2025 10:26)        RADIOLOGY & ADDITIONAL TESTS:  Other Labs  Imaging Personally Reviewed: No interval imaging  Electrocardiogram Personally Reviewed: No interval imaging    Medications and Allergies:   MEDICATIONS  (STANDING):  amLODIPine   Tablet 7.5 milliGRAM(s) Oral daily  chlorhexidine 2% Cloths 1 Application(s) Topical <User Schedule>  dextrose 10% + sodium chloride 0.9%. 1000 milliLiter(s) (100 mL/Hr) IV Continuous <Continuous>  dextrose 10%. 1000 milliLiter(s) (100 mL/Hr) IV Continuous <Continuous>  enoxaparin Injectable 40 milliGRAM(s) SubCutaneous every 24 hours  potassium chloride    Tablet ER 40 milliEquivalent(s) Oral once    MEDICATIONS  (PRN):      Antimicrobials            Allergies    No Known Allergies    Intolerances

## 2025-01-04 DIAGNOSIS — K86.89 OTHER SPECIFIED DISEASES OF PANCREAS: ICD-10-CM

## 2025-01-04 LAB
ALBUMIN SERPL ELPH-MCNC: 3.7 G/DL — SIGNIFICANT CHANGE UP (ref 3.3–5)
ALP SERPL-CCNC: 63 U/L — SIGNIFICANT CHANGE UP (ref 40–120)
ALP SERPL-CCNC: 63 U/L — SIGNIFICANT CHANGE UP (ref 40–120)
ALP SERPL-CCNC: 71 U/L — SIGNIFICANT CHANGE UP (ref 40–120)
ALT FLD-CCNC: 20 U/L — SIGNIFICANT CHANGE UP (ref 10–45)
ALT FLD-CCNC: 21 U/L — SIGNIFICANT CHANGE UP (ref 10–45)
ALT FLD-CCNC: 21 U/L — SIGNIFICANT CHANGE UP (ref 10–45)
ANION GAP SERPL CALC-SCNC: 10 MMOL/L — SIGNIFICANT CHANGE UP (ref 5–17)
ANION GAP SERPL CALC-SCNC: 11 MMOL/L — SIGNIFICANT CHANGE UP (ref 5–17)
ANION GAP SERPL CALC-SCNC: 12 MMOL/L — SIGNIFICANT CHANGE UP (ref 5–17)
APTT BLD: 32.9 SEC — SIGNIFICANT CHANGE UP (ref 24.5–35.6)
AST SERPL-CCNC: 14 U/L — SIGNIFICANT CHANGE UP (ref 10–40)
AST SERPL-CCNC: 15 U/L — SIGNIFICANT CHANGE UP (ref 10–40)
AST SERPL-CCNC: 20 U/L — SIGNIFICANT CHANGE UP (ref 10–40)
BASOPHILS # BLD AUTO: 0.04 K/UL — SIGNIFICANT CHANGE UP (ref 0–0.2)
BASOPHILS # BLD AUTO: 0.04 K/UL — SIGNIFICANT CHANGE UP (ref 0–0.2)
BASOPHILS NFR BLD AUTO: 0.5 % — SIGNIFICANT CHANGE UP (ref 0–2)
BASOPHILS NFR BLD AUTO: 0.5 % — SIGNIFICANT CHANGE UP (ref 0–2)
BILIRUB SERPL-MCNC: 0.3 MG/DL — SIGNIFICANT CHANGE UP (ref 0.2–1.2)
BILIRUB SERPL-MCNC: 0.4 MG/DL — SIGNIFICANT CHANGE UP (ref 0.2–1.2)
BILIRUB SERPL-MCNC: 0.4 MG/DL — SIGNIFICANT CHANGE UP (ref 0.2–1.2)
BUN SERPL-MCNC: 10 MG/DL — SIGNIFICANT CHANGE UP (ref 7–23)
BUN SERPL-MCNC: 13 MG/DL — SIGNIFICANT CHANGE UP (ref 7–23)
BUN SERPL-MCNC: 7 MG/DL — SIGNIFICANT CHANGE UP (ref 7–23)
CALCIUM SERPL-MCNC: 8.2 MG/DL — LOW (ref 8.4–10.5)
CALCIUM SERPL-MCNC: 8.4 MG/DL — SIGNIFICANT CHANGE UP (ref 8.4–10.5)
CALCIUM SERPL-MCNC: 8.6 MG/DL — SIGNIFICANT CHANGE UP (ref 8.4–10.5)
CHLORIDE SERPL-SCNC: 104 MMOL/L — SIGNIFICANT CHANGE UP (ref 96–108)
CHLORIDE SERPL-SCNC: 105 MMOL/L — SIGNIFICANT CHANGE UP (ref 96–108)
CHLORIDE SERPL-SCNC: 107 MMOL/L — SIGNIFICANT CHANGE UP (ref 96–108)
CO2 SERPL-SCNC: 23 MMOL/L — SIGNIFICANT CHANGE UP (ref 22–31)
CO2 SERPL-SCNC: 23 MMOL/L — SIGNIFICANT CHANGE UP (ref 22–31)
CO2 SERPL-SCNC: 25 MMOL/L — SIGNIFICANT CHANGE UP (ref 22–31)
CORTIS AM PEAK SERPL-MCNC: 12.6 UG/DL — SIGNIFICANT CHANGE UP (ref 6–18.4)
CREAT SERPL-MCNC: 0.73 MG/DL — SIGNIFICANT CHANGE UP (ref 0.5–1.3)
CREAT SERPL-MCNC: 0.81 MG/DL — SIGNIFICANT CHANGE UP (ref 0.5–1.3)
CREAT SERPL-MCNC: 0.84 MG/DL — SIGNIFICANT CHANGE UP (ref 0.5–1.3)
EGFR: 111 ML/MIN/1.73M2 — SIGNIFICANT CHANGE UP
EGFR: 112 ML/MIN/1.73M2 — SIGNIFICANT CHANGE UP
EGFR: 116 ML/MIN/1.73M2 — SIGNIFICANT CHANGE UP
EOSINOPHIL # BLD AUTO: 0.29 K/UL — SIGNIFICANT CHANGE UP (ref 0–0.5)
EOSINOPHIL # BLD AUTO: 0.3 K/UL — SIGNIFICANT CHANGE UP (ref 0–0.5)
EOSINOPHIL NFR BLD AUTO: 3.4 % — SIGNIFICANT CHANGE UP (ref 0–6)
EOSINOPHIL NFR BLD AUTO: 3.5 % — SIGNIFICANT CHANGE UP (ref 0–6)
GLUCOSE BLDC GLUCOMTR-MCNC: 100 MG/DL — HIGH (ref 70–99)
GLUCOSE BLDC GLUCOMTR-MCNC: 102 MG/DL — HIGH (ref 70–99)
GLUCOSE BLDC GLUCOMTR-MCNC: 103 MG/DL — HIGH (ref 70–99)
GLUCOSE BLDC GLUCOMTR-MCNC: 113 MG/DL — HIGH (ref 70–99)
GLUCOSE BLDC GLUCOMTR-MCNC: 116 MG/DL — HIGH (ref 70–99)
GLUCOSE BLDC GLUCOMTR-MCNC: 121 MG/DL — HIGH (ref 70–99)
GLUCOSE BLDC GLUCOMTR-MCNC: 121 MG/DL — HIGH (ref 70–99)
GLUCOSE BLDC GLUCOMTR-MCNC: 123 MG/DL — HIGH (ref 70–99)
GLUCOSE BLDC GLUCOMTR-MCNC: 130 MG/DL — HIGH (ref 70–99)
GLUCOSE BLDC GLUCOMTR-MCNC: 134 MG/DL — HIGH (ref 70–99)
GLUCOSE BLDC GLUCOMTR-MCNC: 137 MG/DL — HIGH (ref 70–99)
GLUCOSE BLDC GLUCOMTR-MCNC: 142 MG/DL — HIGH (ref 70–99)
GLUCOSE BLDC GLUCOMTR-MCNC: 448 MG/DL — HIGH (ref 70–99)
GLUCOSE BLDC GLUCOMTR-MCNC: 51 MG/DL — CRITICAL LOW (ref 70–99)
GLUCOSE BLDC GLUCOMTR-MCNC: 52 MG/DL — CRITICAL LOW (ref 70–99)
GLUCOSE BLDC GLUCOMTR-MCNC: 56 MG/DL — LOW (ref 70–99)
GLUCOSE BLDC GLUCOMTR-MCNC: 61 MG/DL — LOW (ref 70–99)
GLUCOSE BLDC GLUCOMTR-MCNC: 79 MG/DL — SIGNIFICANT CHANGE UP (ref 70–99)
GLUCOSE BLDC GLUCOMTR-MCNC: 80 MG/DL — SIGNIFICANT CHANGE UP (ref 70–99)
GLUCOSE BLDC GLUCOMTR-MCNC: 80 MG/DL — SIGNIFICANT CHANGE UP (ref 70–99)
GLUCOSE BLDC GLUCOMTR-MCNC: 86 MG/DL — SIGNIFICANT CHANGE UP (ref 70–99)
GLUCOSE BLDC GLUCOMTR-MCNC: 89 MG/DL — SIGNIFICANT CHANGE UP (ref 70–99)
GLUCOSE BLDC GLUCOMTR-MCNC: 91 MG/DL — SIGNIFICANT CHANGE UP (ref 70–99)
GLUCOSE BLDC GLUCOMTR-MCNC: 91 MG/DL — SIGNIFICANT CHANGE UP (ref 70–99)
GLUCOSE BLDC GLUCOMTR-MCNC: 94 MG/DL — SIGNIFICANT CHANGE UP (ref 70–99)
GLUCOSE BLDC GLUCOMTR-MCNC: 98 MG/DL — SIGNIFICANT CHANGE UP (ref 70–99)
GLUCOSE BLDC GLUCOMTR-MCNC: 98 MG/DL — SIGNIFICANT CHANGE UP (ref 70–99)
GLUCOSE SERPL-MCNC: 101 MG/DL — HIGH (ref 70–99)
GLUCOSE SERPL-MCNC: 136 MG/DL — HIGH (ref 70–99)
GLUCOSE SERPL-MCNC: 78 MG/DL — SIGNIFICANT CHANGE UP (ref 70–99)
HCT VFR BLD CALC: 41 % — SIGNIFICANT CHANGE UP (ref 39–50)
HCT VFR BLD CALC: 42 % — SIGNIFICANT CHANGE UP (ref 39–50)
HGB BLD-MCNC: 12.4 G/DL — LOW (ref 13–17)
HGB BLD-MCNC: 12.5 G/DL — LOW (ref 13–17)
IMM GRANULOCYTES NFR BLD AUTO: 0.3 % — SIGNIFICANT CHANGE UP (ref 0–0.9)
IMM GRANULOCYTES NFR BLD AUTO: 0.4 % — SIGNIFICANT CHANGE UP (ref 0–0.9)
INR BLD: 1.22 RATIO — HIGH (ref 0.85–1.16)
LYMPHOCYTES # BLD AUTO: 1.65 K/UL — SIGNIFICANT CHANGE UP (ref 1–3.3)
LYMPHOCYTES # BLD AUTO: 18.9 % — SIGNIFICANT CHANGE UP (ref 13–44)
LYMPHOCYTES # BLD AUTO: 2.19 K/UL — SIGNIFICANT CHANGE UP (ref 1–3.3)
LYMPHOCYTES # BLD AUTO: 26.7 % — SIGNIFICANT CHANGE UP (ref 13–44)
MAGNESIUM SERPL-MCNC: 1.9 MG/DL — SIGNIFICANT CHANGE UP (ref 1.6–2.6)
MAGNESIUM SERPL-MCNC: 2.1 MG/DL — SIGNIFICANT CHANGE UP (ref 1.6–2.6)
MAGNESIUM SERPL-MCNC: 2.2 MG/DL — SIGNIFICANT CHANGE UP (ref 1.6–2.6)
MCHC RBC-ENTMCNC: 20.1 PG — LOW (ref 27–34)
MCHC RBC-ENTMCNC: 20.5 PG — LOW (ref 27–34)
MCHC RBC-ENTMCNC: 29.8 G/DL — LOW (ref 32–36)
MCHC RBC-ENTMCNC: 30.2 G/DL — LOW (ref 32–36)
MCV RBC AUTO: 67.4 FL — LOW (ref 80–100)
MCV RBC AUTO: 67.9 FL — LOW (ref 80–100)
MONOCYTES # BLD AUTO: 0.67 K/UL — SIGNIFICANT CHANGE UP (ref 0–0.9)
MONOCYTES # BLD AUTO: 0.78 K/UL — SIGNIFICANT CHANGE UP (ref 0–0.9)
MONOCYTES NFR BLD AUTO: 7.7 % — SIGNIFICANT CHANGE UP (ref 2–14)
MONOCYTES NFR BLD AUTO: 9.5 % — SIGNIFICANT CHANGE UP (ref 2–14)
NEUTROPHILS # BLD AUTO: 4.88 K/UL — SIGNIFICANT CHANGE UP (ref 1.8–7.4)
NEUTROPHILS # BLD AUTO: 6.04 K/UL — SIGNIFICANT CHANGE UP (ref 1.8–7.4)
NEUTROPHILS NFR BLD AUTO: 59.4 % — SIGNIFICANT CHANGE UP (ref 43–77)
NEUTROPHILS NFR BLD AUTO: 69.2 % — SIGNIFICANT CHANGE UP (ref 43–77)
NRBC # BLD: 0 /100 WBCS — SIGNIFICANT CHANGE UP (ref 0–0)
NRBC # BLD: 0 /100 WBCS — SIGNIFICANT CHANGE UP (ref 0–0)
PHOSPHATE SERPL-MCNC: 2.2 MG/DL — LOW (ref 2.5–4.5)
PHOSPHATE SERPL-MCNC: 2.3 MG/DL — LOW (ref 2.5–4.5)
PHOSPHATE SERPL-MCNC: 3 MG/DL — SIGNIFICANT CHANGE UP (ref 2.5–4.5)
PLATELET # BLD AUTO: 353 K/UL — SIGNIFICANT CHANGE UP (ref 150–400)
PLATELET # BLD AUTO: 354 K/UL — SIGNIFICANT CHANGE UP (ref 150–400)
POTASSIUM SERPL-MCNC: 3.2 MMOL/L — LOW (ref 3.5–5.3)
POTASSIUM SERPL-MCNC: 3.5 MMOL/L — SIGNIFICANT CHANGE UP (ref 3.5–5.3)
POTASSIUM SERPL-MCNC: 3.8 MMOL/L — SIGNIFICANT CHANGE UP (ref 3.5–5.3)
POTASSIUM SERPL-SCNC: 3.2 MMOL/L — LOW (ref 3.5–5.3)
POTASSIUM SERPL-SCNC: 3.5 MMOL/L — SIGNIFICANT CHANGE UP (ref 3.5–5.3)
POTASSIUM SERPL-SCNC: 3.8 MMOL/L — SIGNIFICANT CHANGE UP (ref 3.5–5.3)
PROT SERPL-MCNC: 6.9 G/DL — SIGNIFICANT CHANGE UP (ref 6–8.3)
PROT SERPL-MCNC: 7.1 G/DL — SIGNIFICANT CHANGE UP (ref 6–8.3)
PROT SERPL-MCNC: 7.2 G/DL — SIGNIFICANT CHANGE UP (ref 6–8.3)
PROTHROM AB SERPL-ACNC: 14 SEC — HIGH (ref 9.9–13.4)
RBC # BLD: 6.04 M/UL — HIGH (ref 4.2–5.8)
RBC # BLD: 6.23 M/UL — HIGH (ref 4.2–5.8)
RBC # FLD: 17.2 % — HIGH (ref 10.3–14.5)
RBC # FLD: 17.6 % — HIGH (ref 10.3–14.5)
SODIUM SERPL-SCNC: 140 MMOL/L — SIGNIFICANT CHANGE UP (ref 135–145)
T4 FREE SERPL-MCNC: 1.3 NG/DL — SIGNIFICANT CHANGE UP (ref 0.9–1.7)
TSH SERPL-MCNC: 3.5 UIU/ML — SIGNIFICANT CHANGE UP (ref 0.27–4.2)
WBC # BLD: 8.21 K/UL — SIGNIFICANT CHANGE UP (ref 3.8–10.5)
WBC # BLD: 8.73 K/UL — SIGNIFICANT CHANGE UP (ref 3.8–10.5)
WBC # FLD AUTO: 8.21 K/UL — SIGNIFICANT CHANGE UP (ref 3.8–10.5)
WBC # FLD AUTO: 8.73 K/UL — SIGNIFICANT CHANGE UP (ref 3.8–10.5)

## 2025-01-04 PROCEDURE — 99291 CRITICAL CARE FIRST HOUR: CPT | Mod: GC

## 2025-01-04 PROCEDURE — 99232 SBSQ HOSP IP/OBS MODERATE 35: CPT

## 2025-01-04 RX ORDER — SODIUM PHOSPHATE, MONOBASIC, MONOHYDRATE AND SODIUM PHOSPHATE, DIBASIC ANHYDROUS 142; 276 MG/ML; MG/ML
15 INJECTION, SOLUTION INTRAVENOUS ONCE
Refills: 0 | Status: DISCONTINUED | OUTPATIENT
Start: 2025-01-04 | End: 2025-01-04

## 2025-01-04 RX ORDER — POTASSIUM CHLORIDE 600 MG/1
40 TABLET, FILM COATED, EXTENDED RELEASE ORAL ONCE
Refills: 0 | Status: COMPLETED | OUTPATIENT
Start: 2025-01-04 | End: 2025-01-04

## 2025-01-04 RX ORDER — OCTREOTIDE ACETATE 20 MG
300 KIT INTRAMUSCULAR EVERY 8 HOURS
Refills: 0 | Status: DISCONTINUED | OUTPATIENT
Start: 2025-01-04 | End: 2025-01-04

## 2025-01-04 RX ORDER — DEXTROSE MONOHYDRATE 25 G/50ML
25 INJECTION, SOLUTION INTRAVENOUS ONCE
Refills: 0 | Status: COMPLETED | OUTPATIENT
Start: 2025-01-04 | End: 2025-01-04

## 2025-01-04 RX ORDER — SOD PHOS DI, MONO/K PHOS MONO 250 MG
1 TABLET ORAL ONCE
Refills: 0 | Status: COMPLETED | OUTPATIENT
Start: 2025-01-04 | End: 2025-01-04

## 2025-01-04 RX ORDER — OCTREOTIDE ACETATE 20 MG
50 KIT INTRAMUSCULAR EVERY 6 HOURS
Refills: 0 | Status: DISCONTINUED | OUTPATIENT
Start: 2025-01-04 | End: 2025-01-05

## 2025-01-04 RX ORDER — DEXTROSE MONOHYDRATE 25 G/50ML
50 INJECTION, SOLUTION INTRAVENOUS ONCE
Refills: 0 | Status: COMPLETED | OUTPATIENT
Start: 2025-01-04 | End: 2025-01-04

## 2025-01-04 RX ADMIN — POTASSIUM CHLORIDE 40 MILLIEQUIVALENT(S): 600 TABLET, FILM COATED, EXTENDED RELEASE ORAL at 02:08

## 2025-01-04 RX ADMIN — POTASSIUM CHLORIDE 40 MILLIEQUIVALENT(S): 600 TABLET, FILM COATED, EXTENDED RELEASE ORAL at 11:59

## 2025-01-04 RX ADMIN — DEXTROSE MONOHYDRATE 50 MILLILITER(S): 25 INJECTION, SOLUTION INTRAVENOUS at 15:15

## 2025-01-04 RX ADMIN — DEXTROSE MONOHYDRATE 100 MILLILITER(S): 100 INJECTION, SOLUTION INTRAVENOUS at 10:01

## 2025-01-04 RX ADMIN — DEXTROSE MONOHYDRATE 50 MILLILITER(S): 25 INJECTION, SOLUTION INTRAVENOUS at 16:21

## 2025-01-04 RX ADMIN — CHLORHEXIDINE GLUCONATE 1 APPLICATION(S): 1.2 RINSE ORAL at 05:09

## 2025-01-04 RX ADMIN — SODIUM CHLORIDE 100 MILLILITER(S): 9 INJECTION, SOLUTION INTRAVENOUS at 15:41

## 2025-01-04 RX ADMIN — DEXTROSE MONOHYDRATE 25 MILLILITER(S): 25 INJECTION, SOLUTION INTRAVENOUS at 12:37

## 2025-01-04 RX ADMIN — ENOXAPARIN SODIUM 40 MILLIGRAM(S): 60 INJECTION INTRAVENOUS; SUBCUTANEOUS at 13:40

## 2025-01-04 RX ADMIN — Medication 150 MILLIGRAM(S): at 02:07

## 2025-01-04 RX ADMIN — Medication 150 MILLIGRAM(S): at 12:50

## 2025-01-04 RX ADMIN — Medication 1 TABLET(S): at 02:08

## 2025-01-04 RX ADMIN — Medication 150 MILLIGRAM(S): at 18:47

## 2025-01-04 RX ADMIN — SODIUM CHLORIDE 125 MILLILITER(S): 9 INJECTION, SOLUTION INTRAVENOUS at 20:39

## 2025-01-04 RX ADMIN — DEXTROSE MONOHYDRATE 125 MILLILITER(S): 100 INJECTION, SOLUTION INTRAVENOUS at 20:39

## 2025-01-04 RX ADMIN — Medication 7.5 MILLIGRAM(S): at 05:09

## 2025-01-04 NOTE — PROGRESS NOTE ADULT - ASSESSMENT
44 M with pmhx of HTN who initially presented after experiencing generalized weakness and confusion who was admitted to MICU for significant and persistent hypoglycemia    Neuro:  - AOX 3, mental status at baseline. Initially lethargic 2/2 to hypoglycemia   CTH showed no acute intracranial hemorrhage, mass effect, or midline shift.    Cardiovascular  # Hypertension  - at home on amlodipine 7.5mg daily   - can continue for now     Respiratory  # non-labored breathing, satting 95% on room air   - no active issues     GI/Nutrition  # Diarrhea   - had 3-4 days of watery diarrhea in [place]  - other close contacts had similar symptoms, has resolved  - likely viral gastroenteritis, less likely VIPoma given self resolving nature of diarrhea and close contacts with similar symptoms   - can consider GI PCR if diarrhea recurs     #Nutrition  - DASH, TLC    /Renal  - no active issues     ID  - patient afebrile, no leukocytosis   - f/u urine cultures     Endocrine  #Hypoglycemia   - given CT abdomen and pelvis showing lesions in pancreatic tail along with persistent hypoglycemia c/f Insulinoma  - continue with q1 FS for now   - continue with 2 IVs running D10 at 100cc/hr, if needed will consider D20 drip   - f/u hypoglycemic labs   - endocrine following, recs appreciated      Hematology   #DVT ppx   Lovenox   Oncology  #pancreatic tail mass  - CT Abdomen and pelvis showing Lobulated 4.3 cm enhancing pancreatic tail mass, suspicious for a neuroendocrine tumor given the patient's clinical history. Additional 2.1 cm indeterminate hypoattenuating lesion in the pancreatic body.   - MR abdomen and MRCP ordered for further Consider contrast enhanced MRI of the abdomen/MRCP for further evaluation.  - f/u gastrin, vasoactive intestinal peptide, chromogranin A, and c-peptide levels    44 M with pmhx of HTN who initially presented after experiencing generalized weakness and confusion who was admitted to MICU for significant and persistent hypoglycemia    Neuro:  - AOX 3, mental status at baseline. Initially lethargic 2/2 to hypoglycemia   CTH showed no acute intracranial hemorrhage, mass effect, or midline shift.    Cardiovascular  # Hypertension  - at home on amlodipine 7.5mg daily   - can continue for now     Respiratory  # non-labored breathing, satting 95% on room air   - no active issues     GI/Nutrition  # Diarrhea   - had 3-4 days of watery diarrhea in [place]  - other close contacts had similar symptoms, has resolved  - likely viral gastroenteritis, less likely VIPoma given self resolving nature of diarrhea and close contacts with similar symptoms   - can consider GI PCR if diarrhea recurs     #Nutrition  - DASH, TLC    /Renal  - no active issues     ID  - patient afebrile, no leukocytosis   - f/u urine cultures     Endocrine  #Hypoglycemia  #Insulinoma   CT abdomen and pelvis showing lesions in pancreatic tail along with persistent hypoglycemia c/f Insulinoma  - Surg onc consulted   - c/w diazoxide per endo   - continue with q1 FS for now   - continue with 2 IVs running D10 at 100cc/hr, if needed will consider D20 drip   - f/u hypoglycemic labs   - endocrine following, recs appreciated      Hematology   #DVT ppx   Lovenox   Oncology  #pancreatic tail mass  - Surg onc consulted   - CT Abdomen and pelvis showing Lobulated 4.3 cm enhancing pancreatic tail mass, suspicious for a neuroendocrine tumor given the patient's clinical history. Additional 2.1 cm indeterminate hypoattenuating lesion in the pancreatic body.   - MR abdomen and MRCP ordered for further Consider contrast enhanced MRI of the abdomen/MRCP for further evaluation.  - CT chest w/ IV con for mets screening   - f/u gastrin, vasoactive intestinal peptide, chromogranin A, and c-peptide levels    44 M with pmhx of HTN who initially presented after experiencing generalized weakness and confusion who was admitted to MICU for significant and persistent hypoglycemia    Neuro:  - AOX 3, mental status at baseline. Initially lethargic 2/2 to hypoglycemia   CTH showed no acute intracranial hemorrhage, mass effect, or midline shift.    Cardiovascular  # Hypertension  - at home on amlodipine 7.5mg daily   - can continue for now     Respiratory  # non-labored breathing, satting 95% on room air   - no active issues     GI/Nutrition  # Diarrhea   - had 3-4 days of watery diarrhea in [place]  - other close contacts had similar symptoms, has resolved  - likely viral gastroenteritis, less likely VIPoma given self resolving nature of diarrhea and close contacts with similar symptoms   - can consider GI PCR if diarrhea recurs     #Nutrition  - DASH, TLC    /Renal  - no active issues     ID  - patient afebrile, no leukocytosis   - f/u urine cultures     Endocrine  #Hypoglycemia  #Insulinoma   CT abdomen and pelvis showing lesions in pancreatic tail along with persistent hypoglycemia c/f Insulinoma  - Surg onc consulted   - c/w diazoxide per endo   - continue with q1 FS for now   - continue with 2 IVs running D10 at 100cc/hr, if needed will consider D20 drip   - f/u hypoglycemic labs   - endocrine following, recs appreciated      Hematology   #DVT ppx   - Lovenox     Oncology  #pancreatic tail mass  #insulinoma   CT Abdomen and pelvis showing Lobulated 4.3 cm enhancing pancreatic tail mass, suspicious for a neuroendocrine tumor given the patient's clinical history. Additional 2.1 cm indeterminate hypoattenuating lesion in the pancreatic body.   MR abdomen and MRCP ordered for further Consider contrast enhanced MRI of the abdomen/MRCP for further evaluation.  - Surg onc consulted   - advanced GI consulted for U/S   - CT chest w/ IV con for mets screening   - f/u gastrin, vasoactive intestinal peptide, chromogranin A, and c-peptide levels

## 2025-01-04 NOTE — CHART NOTE - NSCHARTNOTEFT_GEN_A_CORE
MICU downgrade was canceled in the setting of persistent hypoglycemia; will require q1h finger sticks

## 2025-01-04 NOTE — PROGRESS NOTE ADULT - SUBJECTIVE AND OBJECTIVE BOX
HPI:    Patient is a 44y old  Male who presents with a chief complaint of Hypoglycemia (04 Jan 2025 10:00)  Endocrinology consulted for hypoglycemia, concern for insulinoma.    INTERVAL HPI/OVERNIGHT EVENTS:  Seen at the bedside. Tolerating diet well, denies nausea or vomiting.   BG stable overnight, remains on 2 D10 IVF infusions  Started diazoxide yesterday- has received 3 doses thus far  MR abdomen - official read pending    CAPILLARY BLOOD GLUCOSE  POCT Blood Glucose.: 91 mg/dL (04 Jan 2025 10:05)  POCT Blood Glucose.: 89 mg/dL (04 Jan 2025 08:39)  POCT Blood Glucose.: 91 mg/dL (04 Jan 2025 07:54)  POCT Blood Glucose.: 448 mg/dL (04 Jan 2025 07:52)  POCT Blood Glucose.: 94 mg/dL (04 Jan 2025 06:40)  POCT Blood Glucose.: 113 mg/dL (04 Jan 2025 05:37)  POCT Blood Glucose.: 100 mg/dL (04 Jan 2025 04:37)  POCT Blood Glucose.: 102 mg/dL (04 Jan 2025 03:26)  POCT Blood Glucose.: 98 mg/dL (04 Jan 2025 02:34)  POCT Blood Glucose.: 86 mg/dL (04 Jan 2025 01:30)  POCT Blood Glucose.: 80 mg/dL (04 Jan 2025 00:29)  POCT Blood Glucose.: 74 mg/dL (03 Jan 2025 23:29)  POCT Blood Glucose.: 77 mg/dL (03 Jan 2025 22:28)  POCT Blood Glucose.: 77 mg/dL (03 Jan 2025 21:29)  POCT Blood Glucose.: 76 mg/dL (03 Jan 2025 20:33)  POCT Blood Glucose.: 113 mg/dL (03 Jan 2025 19:37)  POCT Blood Glucose.: 100 mg/dL (03 Jan 2025 18:26)  POCT Blood Glucose.: 87 mg/dL (03 Jan 2025 16:19)  POCT Blood Glucose.: 101 mg/dL (03 Jan 2025 15:43)  POCT Blood Glucose.: 62 mg/dL (03 Jan 2025 15:06)  POCT Blood Glucose.: 103 mg/dL (03 Jan 2025 14:38)  POCT Blood Glucose.: 65 mg/dL (03 Jan 2025 14:08)  POCT Blood Glucose.: 72 mg/dL (03 Jan 2025 13:11)  POCT Blood Glucose.: 88 mg/dL (03 Jan 2025 12:06)  POCT Blood Glucose.: 73 mg/dL (03 Jan 2025 11:05)       MEDICATIONS  (STANDING):  amLODIPine   Tablet 7.5 milliGRAM(s) Oral daily  chlorhexidine 2% Cloths 1 Application(s) Topical <User Schedule>  dextrose 10% + sodium chloride 0.9%. 1000 milliLiter(s) (100 mL/Hr) IV Continuous <Continuous>  dextrose 10%. 1000 milliLiter(s) (100 mL/Hr) IV Continuous <Continuous>  diazoxide Suspension 150 milliGRAM(s) Oral every 8 hours  enoxaparin Injectable 40 milliGRAM(s) SubCutaneous every 24 hours  potassium chloride    Tablet ER 40 milliEquivalent(s) Oral once    MEDICATIONS  (PRN):      PHYSICAL EXAM  Vital Signs Last 24 Hrs  T(C): 36.6 (04 Jan 2025 08:00), Max: 36.9 (03 Jan 2025 15:00)  T(F): 97.9 (04 Jan 2025 08:00), Max: 98.4 (03 Jan 2025 15:00)  HR: 88 (04 Jan 2025 10:00) (73 - 92)  BP: 160/95 (04 Jan 2025 10:00) (133/79 - 171/79)  BP(mean): 120 (04 Jan 2025 10:00) (98 - 130)  RR: 16 (04 Jan 2025 10:00) (12 - 25)  SpO2: 98% (04 Jan 2025 10:00) (76% - 100%)    Parameters below as of 03 Jan 2025 20:00  Patient On (Oxygen Delivery Method): room air      GENERAL: Male laying in bed in NAD  RESPIRATORY: nonlabored breathing, no accessory muscle use  Extremities: Warm, no edema in all 4 exts   NEURO: A&O X3    LABS:                        12.5   8.21  )-----------( 353      ( 03 Jan 2025 23:38 )             42.0     01-04    140  |  104  |  7   ----------------------------<  101[H]  3.5   |  25  |  0.84    Ca    8.2[L]      04 Jan 2025 08:42  Phos  3.0     01-04  Mg     2.2     01-04    TPro  7.2  /  Alb  3.7  /  TBili  0.4  /  DBili  x   /  AST  15  /  ALT  21  /  AlkPhos  63  01-04    PT/INR - ( 03 Jan 2025 23:38 )   PT: 14.0 sec;   INR: 1.22 ratio         PTT - ( 03 Jan 2025 23:38 )  PTT:32.9 sec  Urinalysis Basic - ( 04 Jan 2025 08:42 )    Color: x / Appearance: x / SG: x / pH: x  Gluc: 101 mg/dL / Ketone: x  / Bili: x / Urobili: x   Blood: x / Protein: x / Nitrite: x   Leuk Esterase: x / RBC: x / WBC x   Sq Epi: x / Non Sq Epi: x / Bacteria: x      Thyroid Stimulating Hormone, Serum: 1.60 uIU/mL (01-02 @ 15:54)       A/P: Patient is a 44 year old male with past medical history of hypertension who presented to the hospital with hypoglycemia. Endocrinology consulted for hypoglycemia.    #Hypoglycemia  - glucose 39 on initial BMP, 46 on most recent fingerstick  - Unclear if patient is meeting Whipple's triad at this time as he does not have classic hypoglycemia symptoms and has not been adequately treated to bring his glucose back up.   - CT abd/pel with PANCREAS: Lobulated enhancing mass at the pancreatic tail measuring 4.3 x 3.7 cm, with abutment of the left kidney at the posterior margin. Additional hypoattenuating lesion in the pancreatic body measuring 2.1 x 1.8 cm.  ADRENALS: Nonspecific mildly thickened bilateral adrenal glands.  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 DDx also includes other causes of insulin-mediated hypoglycemia (i.e insulin antibodies), adrenal insufficiency, or medications  PLAN:  - Continue diazoxide 150mg q8h  - continue D10, he has D10 100cc/hr and D10+NS 100cc/hr  - continue to check FSG q4h for now  - hypoglycemia protocol  - Check CT chest with IV contrast to evaluate for mets  - follow up sulfonylurea panel, insulin antibodies.   - follow up 8 AM serum cortisol  - follow up neuroendocrine tumor markers as baseline: chromogranin A, gastrin, VIP level  - Appreciate surg onc consult, agree with GI consult for EUS. Recommend first line treatment of surgical resection for treatment of insulinoma  - Outpatient genetic testing    #HTN  Management per primary team, currently on amlodipine 7.5 mg qd      Thank you for the consult. Will continue to monitor. Please inform the endocrinology team at least 24 hours prior to intended discharge date.     Contact via pager or [employer] during business hours. For follow up questions, discharge recommendations, or new consults please call answering service at [phone] (weekdays), [phone] (nights/weekends). For nonurgent matters, please email  [email or web address].      Zayda Morgan D.O.  Attending Physician   Department of Endocrinology, Diabetes and Metabolism   [employer]     For urgent matters before 9AM or after 5PM, or on weekends/holidays, please page the on call endocrine fellow.   For nonurgent matters, please email [email or web address] for assistance.

## 2025-01-04 NOTE — PROGRESS NOTE ADULT - SUBJECTIVE AND OBJECTIVE BOX
General Surgery Progress Note    Subjective: Patient resting in bed. Denies abdominal pain, N/V.    Objective:  Vitals:  T(C): 36.9 (01-04-25 @ 04:00), Max: 36.9 (01-03-25 @ 15:00)  HR: 78 (01-04-25 @ 08:00) (73 - 92)  BP: 162/99 (01-04-25 @ 08:00) (133/79 - 171/79)  RR: 12 (01-04-25 @ 08:00) (12 - 25)  SpO2: 100% (01-04-25 @ 08:00) (76% - 100%)  Wt(kg): --    01-03 @ 07:01  -  01-04 @ 07:00  --------------------------------------------------------  IN:    dextrose 10%: 2200 mL    dextrose 10% + sodium chloride 0.9%: 2200 mL    Oral Fluid: 701 mL  Total IN: 5101 mL    OUT:    Voided (mL): 3300 mL  Total OUT: 3300 mL    Total NET: 1801 mL          Physical Exam:  General: WN/WD NAD  Neurology: A&Ox3, nonfocal, follows commands  Respiratory: CTA B/L  CV: Normal rate regular rhythm  Abdominal: Soft, NT, ND  Extremities: No edema        Labs:                        12.5   8.21  )-----------( 353      ( 03 Jan 2025 23:38 )             42.0     01-04    140  |  104  |  7   ----------------------------<  101[H]  3.5   |  25  |  0.84    Ca    8.2[L]      04 Jan 2025 08:42  Phos  3.0     01-04  Mg     2.2     01-04    TPro  7.2  /  Alb  3.7  /  TBili  0.4  /  DBili  x   /  AST  15  /  ALT  21  /  AlkPhos  63  01-04    LIVER FUNCTIONS - ( 04 Jan 2025 08:42 )  Alb: 3.7 g/dL / Pro: 7.2 g/dL / ALK PHOS: 63 U/L / ALT: 21 U/L / AST: 15 U/L / GGT: x           PT/INR - ( 03 Jan 2025 23:38 )   PT: 14.0 sec;   INR: 1.22 ratio         PTT - ( 03 Jan 2025 23:38 )  PTT:32.9 sec  Urinalysis Basic - ( 04 Jan 2025 08:42 )    Color: x / Appearance: x / SG: x / pH: x  Gluc: 101 mg/dL / Ketone: x  / Bili: x / Urobili: x   Blood: x / Protein: x / Nitrite: x   Leuk Esterase: x / RBC: x / WBC x   Sq Epi: x / Non Sq Epi: x / Bacteria: x      cr< from: CT Abdomen and Pelvis w/ IV Cont (01.02.25 @ 14:23) >  Lobulated 4.3 cm enhancing pancreatic tail mass, suspicious for a   neuroendocrine tumor given the patient's clinical history.    Additional 2.1 cm indeterminate hypoattenuating lesion in the pancreatic   body. Consider contrast enhanced MRI of the abdomen/MRCP for further   evaluation.    < end of copied text >

## 2025-01-04 NOTE — PROGRESS NOTE ADULT - ASSESSMENT
44 M with pmhx of HTN who initially presented after experiencing generalized weakness and confusion who was admitted to MICU for significant and persistent hypoglycemia. CT showing 4x3cm lesion in tail of pancreas and 2x2cm lesion in body. Patient with persistent hypoglycemia despite D10 administration. Patient also with elevated c-peptide of 5.6. 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    Plan:   - High suspicion for insulinoma in tail of panc, will need operative intervention after workup complete  - MRI performed, will follow up read to better delineate mid body pancreatic mass  - Please obtain GI consult, patient will need EUA to assess pancreatic body lesion   - Surgical oncology to follow     [facility] Surgery, [id number]

## 2025-01-04 NOTE — PROGRESS NOTE ADULT - SUBJECTIVE AND OBJECTIVE BOX
MICU Progress Note    SUBJECTIVE  INTERVAL EVENTS:  - NAEON    OBJECTIVE  Physical Exam:   PHYSICAL EXAM:  GENERAL: No acute distress, well-developed  HEAD:  Atraumatic, Normocephalic  EYES: EOMI, PERRLA, conjunctiva and sclera clear  NECK: Supple, no lymphadenopathy, no JVD  CHEST/LUNG: CTAB; No wheezes, rales, or rhonchi  HEART: Regular rate and rhythm. Normal S1/S2. No murmurs, rubs, or gallops  ABDOMEN: Soft, non-tender, non-distended; normal bowel sounds, no organomegaly  EXTREMITIES:  2+ peripheral pulses b/l, No clubbing, cyanosis, or edema  NEUROLOGY: A&O x 3, no focal deficits  SKIN: No rashes or lesions    ICU Vital Signs Last 24 Hrs  T(F): 98.4 (04 Jan 2025 04:00), Max: 98.4 (03 Jan 2025 08:00)  HR: 76 (04 Jan 2025 07:00) (73 - 92)  BP: 160/92 (04 Jan 2025 07:00) (133/79 - 173/84)  BP(mean): 118 (04 Jan 2025 07:00) (98 - 130)  ABP: --  ABP(mean): --  RR: 12 (04 Jan 2025 07:00) (12 - 25)  SpO2: 99% (04 Jan 2025 07:00) (76% - 100%)    I&O's Summary    03 Jan 2025 07:01  -  04 Jan 2025 07:00  --------------------------------------------------------  IN: 5101 mL / OUT: 3300 mL / NET: 1801 mL        ECMO Day#     Adult Advanced Hemodynamics Last 24 Hrs  CVP(mm Hg): --  CVP(cm H2O): --  CO: --  CI: --  PA: --  PA(mean): --  PCWP: --  SVR: --  SVRI: --  PVR: --  PVRI: --    ECMO SETTINGS:  Type:		[ ] Venovenous		[ ] Venoarterial  Cannulation Site(s):     Flow:  	        RPM: 		    Oxygenator:	Sweep:     L/min	FiO2:        LABS:                        12.5   8.21  )-----------( 353      ( 03 Jan 2025 23:38 )             42.0     01-03    140  |  107  |  10  ----------------------------<  78  3.2[L]   |  23  |  0.73    Ca    8.4      03 Jan 2025 23:38  Phos  2.3     01-03  Mg     2.1     01-03    TPro  6.9  /  Alb  3.7  /  TBili  0.4  /  DBili  x   /  AST  14  /  ALT  21  /  AlkPhos  63  01-03    PT/INR - ( 03 Jan 2025 23:38 )   PT: 14.0 sec;   INR: 1.22 ratio         PTT - ( 03 Jan 2025 23:38 )  PTT:32.9 sec      Venous: 01-03-25 @ 23:30 FiO2: -- Oxygen Sat% 93.4    Urinalysis Basic - ( 03 Jan 2025 23:38 )    Color: x / Appearance: x / SG: x / pH: x  Gluc: 78 mg/dL / Ketone: x  / Bili: x / Urobili: x   Blood: x / Protein: x / Nitrite: x   Leuk Esterase: x / RBC: x / WBC x   Sq Epi: x / Non Sq Epi: x / Bacteria: x          CAPILLARY BLOOD GLUCOSE      POCT Blood Glucose.: 94 mg/dL (04 Jan 2025 06:40)  POCT Blood Glucose.: 113 mg/dL (04 Jan 2025 05:37)  POCT Blood Glucose.: 100 mg/dL (04 Jan 2025 04:37)  POCT Blood Glucose.: 102 mg/dL (04 Jan 2025 03:26)  POCT Blood Glucose.: 98 mg/dL (04 Jan 2025 02:34)  POCT Blood Glucose.: 86 mg/dL (04 Jan 2025 01:30)  POCT Blood Glucose.: 80 mg/dL (04 Jan 2025 00:29)  POCT Blood Glucose.: 74 mg/dL (03 Jan 2025 23:29)  POCT Blood Glucose.: 77 mg/dL (03 Jan 2025 22:28)  POCT Blood Glucose.: 77 mg/dL (03 Jan 2025 21:29)  POCT Blood Glucose.: 76 mg/dL (03 Jan 2025 20:33)  POCT Blood Glucose.: 113 mg/dL (03 Jan 2025 19:37)  POCT Blood Glucose.: 100 mg/dL (03 Jan 2025 18:26)  POCT Blood Glucose.: 87 mg/dL (03 Jan 2025 16:19)  POCT Blood Glucose.: 101 mg/dL (03 Jan 2025 15:43)  POCT Blood Glucose.: 62 mg/dL (03 Jan 2025 15:06)  POCT Blood Glucose.: 103 mg/dL (03 Jan 2025 14:38)  POCT Blood Glucose.: 65 mg/dL (03 Jan 2025 14:08)  POCT Blood Glucose.: 72 mg/dL (03 Jan 2025 13:11)  POCT Blood Glucose.: 88 mg/dL (03 Jan 2025 12:06)  POCT Blood Glucose.: 73 mg/dL (03 Jan 2025 11:05)  POCT Blood Glucose.: 112 mg/dL (03 Jan 2025 10:01)  POCT Blood Glucose.: 88 mg/dL (03 Jan 2025 09:08)  POCT Blood Glucose.: 88 mg/dL (03 Jan 2025 08:06)      Culture - Urine (collected 02 Jan 2025 15:12)  Source: Clean Catch Clean Catch (Midstream)  Final Report (03 Jan 2025 15:32):    <10,000 CFU/mL Normal Urogenital Sarah        RADIOLOGY & ADDITIONAL TESTS:  Other Labs  Imaging Personally Reviewed: No interval imaging  Electrocardiogram Personally Reviewed: No interval imaging    Medications and Allergies:   MEDICATIONS  (STANDING):  amLODIPine   Tablet 7.5 milliGRAM(s) Oral daily  chlorhexidine 2% Cloths 1 Application(s) Topical <User Schedule>  dextrose 10% + sodium chloride 0.9%. 1000 milliLiter(s) (100 mL/Hr) IV Continuous <Continuous>  dextrose 10%. 1000 milliLiter(s) (100 mL/Hr) IV Continuous <Continuous>  diazoxide Suspension 150 milliGRAM(s) Oral every 8 hours  enoxaparin Injectable 40 milliGRAM(s) SubCutaneous every 24 hours    MEDICATIONS  (PRN):      Antimicrobials            Allergies    No Known Allergies    Intolerances

## 2025-01-04 NOTE — CHART NOTE - NSCHARTNOTEFT_GEN_A_CORE
MICU Transfer Note    Transfer from: MICU    Transfer to: (x) Medicine    (  ) Telemetry     (   ) RCU        (    ) Palliative         (   ) Stroke Unit          (   ) __________________    Accepting Physician:    Signout given to:       HPI:HPI:  45 yo M with pmhx of HTN comes to the ED after experiencing confusion and weakness. Patient's symptoms started on 12/30 while he was away in [place]. He was experiencing 4 episodes of diarrhea at the time. However, he attributed his symptoms to gastroenteritis as his family members were experiencing similar symptoms. He returned to [place] and was found to be experiencing confusion as he was spraying the ceiling with water. He went to urgent care and was then recommended to come to the hospital. He has never experienced symptoms like this prior. His ROS otherwise is notable for a history of night sweats and he is unsure if he has experienced unintentional weight loss. Today, he is feeling better as his confusion and weakness has improved. His last episode of diarrhea was also the day prior to coming to the hospital.     In the ED, his vitals were wnl and his labs were significant for persistent hypoglycemia and hypoglycemia. A CT A/P was also obtained that showed pancreatic lesions s/p D50 and initiation of D5LR. He is now admitted for further management.  (02 Jan 2025 21:05)        MICU Course:      Assessment/Plan:      To Do:  [ ] f/u surgery recs  [ ] f/u advanced GI recs   [ ] f/u endo recs   [ ] monitor blood glucose MICU Transfer Note    Transfer from: MICU    Transfer to: (x) Medicine    (  ) Telemetry     (   ) RCU        (    ) Palliative         (   ) Stroke Unit          (   ) __________________    Accepting Physician: Dr. Caballero    Signout given to:       HPI:HPI:  45 yo M with pmhx of HTN comes to the ED after experiencing confusion and weakness. Patient's symptoms started on 12/30 while he was away in [place]. He was experiencing 4 episodes of diarrhea at the time. However, he attributed his symptoms to gastroenteritis as his family members were experiencing similar symptoms. He returned to [place] and was found to be experiencing confusion as he was spraying the ceiling with water. He went to urgent care and was then recommended to come to the hospital. He has never experienced symptoms like this prior. His ROS otherwise is notable for a history of night sweats and he is unsure if he has experienced unintentional weight loss. Today, he is feeling better as his confusion and weakness has improved. His last episode of diarrhea was also the day prior to coming to the hospital.     In the ED, his vitals were wnl and his labs were significant for persistent hypoglycemia and hypoglycemia. A CT A/P was also obtained that showed pancreatic lesions s/p D50 and initiation of D5LR. He is now admitted for further management.  (02 Jan 2025 21:05)        MICU Course: Patient admitted to MICU due to persistent hypoglycemia requiring q1 finger sticks. During entire hospital course, patient mental status returned to baseline, did not require pressors, and was comfortable on room air. Started on D10 and D10/NS. CT scan of a/p showing pancreatic mass concerning for insulinoma as well as lesion in pancreatic body. Endo and surg oncology consulted for possible insulinoma. GI consulted for possible IPMN w/u. Diazoxide started per endo. Workup, including hypoglycemia w/u, MRI (pending read), and CT chest w/ IV (for mets screening) pending. Endo recommending q4 FS checks, patient no longer w/ MICU indication.         Assessment/Plan:  44 M with pmhx of HTN who initially presented after experiencing generalized weakness and confusion who was admitted to MICU for significant and persistent hypoglycemia    Neuro:  AOX 3, mental status at baseline. Initially lethargic 2/2 to hypoglycemia   CTH showed no acute intracranial hemorrhage, mass effect, or midline shift.  - CTM    Cardiovascular  # Hypertension  - at home on amlodipine 7.5mg daily   - can continue for now     Respiratory  # non-labored breathing, satting 95% on room air   - no active issues     GI/Nutrition  # Diarrhea   Had 3-4 days of watery diarrhea in [place]  likely viral gastroenteritis, less likely VIPoma given self resolving nature of diarrhea and close contacts with similar symptoms   - can consider GI PCR if diarrhea recurs     #Nutrition  - DASH, TLC    /Renal  - no active issues     ID  - patient afebrile, no leukocytosis   - f/u urine cultures     Endocrine  #Hypoglycemia  #Insulinoma   CT abdomen and pelvis showing lesions in pancreatic tail along with persistent hypoglycemia c/f Insulinoma  - Surg onc consulted   - c/w diazoxide per endo   - continue with q4h FS per endo   - continue with 2 IVs running D10 in 1, D10/NS in other   - f/u hypoglycemic labs   - endocrine following, recs appreciated      Hematology   #DVT ppx   - Lovenox     Oncology  #pancreatic tail mass  #insulinoma   CT Abdomen and pelvis showing Lobulated 4.3 cm enhancing pancreatic tail mass, suspicious for a neuroendocrine tumor given the patient's clinical history. Additional 2.1 cm indeterminate hypoattenuating lesion in the pancreatic body.   MR abdomen and MRCP ordered for further Consider contrast enhanced MRI of the abdomen/MRCP for further evaluation.  - Surg onc consulted   - advanced GI consulted for U/S   - CT chest w/ IV con for mets screening   - f/u gastrin, vasoactive intestinal peptide, chromogranin A, and c-peptide levels         To Do:  [ ] f/u surgery recs  [ ] f/u advanced GI recs   [ ] f/u endo recs   [ ] monitor blood glucose

## 2025-01-05 LAB
ALBUMIN SERPL ELPH-MCNC: 3.7 G/DL — SIGNIFICANT CHANGE UP (ref 3.3–5)
ALP SERPL-CCNC: 68 U/L — SIGNIFICANT CHANGE UP (ref 40–120)
ALT FLD-CCNC: 21 U/L — SIGNIFICANT CHANGE UP (ref 10–45)
ANION GAP SERPL CALC-SCNC: 12 MMOL/L — SIGNIFICANT CHANGE UP (ref 5–17)
AST SERPL-CCNC: 11 U/L — SIGNIFICANT CHANGE UP (ref 10–40)
BASOPHILS # BLD AUTO: 0.03 K/UL — SIGNIFICANT CHANGE UP (ref 0–0.2)
BASOPHILS NFR BLD AUTO: 0.4 % — SIGNIFICANT CHANGE UP (ref 0–2)
BILIRUB SERPL-MCNC: 0.4 MG/DL — SIGNIFICANT CHANGE UP (ref 0.2–1.2)
BUN SERPL-MCNC: 5 MG/DL — LOW (ref 7–23)
CALCIUM SERPL-MCNC: 8.2 MG/DL — LOW (ref 8.4–10.5)
CHLORIDE SERPL-SCNC: 105 MMOL/L — SIGNIFICANT CHANGE UP (ref 96–108)
CO2 SERPL-SCNC: 23 MMOL/L — SIGNIFICANT CHANGE UP (ref 22–31)
CREAT SERPL-MCNC: 0.7 MG/DL — SIGNIFICANT CHANGE UP (ref 0.5–1.3)
EGFR: 117 ML/MIN/1.73M2 — SIGNIFICANT CHANGE UP
EOSINOPHIL # BLD AUTO: 0.28 K/UL — SIGNIFICANT CHANGE UP (ref 0–0.5)
EOSINOPHIL NFR BLD AUTO: 3.9 % — SIGNIFICANT CHANGE UP (ref 0–6)
GAS PNL BLDV: SIGNIFICANT CHANGE UP
GASTRIN SERPL-MCNC: 19 PG/ML — SIGNIFICANT CHANGE UP (ref 0–115)
GLUCOSE BLDC GLUCOMTR-MCNC: 101 MG/DL — HIGH (ref 70–99)
GLUCOSE BLDC GLUCOMTR-MCNC: 103 MG/DL — HIGH (ref 70–99)
GLUCOSE BLDC GLUCOMTR-MCNC: 107 MG/DL — HIGH (ref 70–99)
GLUCOSE BLDC GLUCOMTR-MCNC: 111 MG/DL — HIGH (ref 70–99)
GLUCOSE BLDC GLUCOMTR-MCNC: 113 MG/DL — HIGH (ref 70–99)
GLUCOSE BLDC GLUCOMTR-MCNC: 120 MG/DL — HIGH (ref 70–99)
GLUCOSE BLDC GLUCOMTR-MCNC: 121 MG/DL — HIGH (ref 70–99)
GLUCOSE BLDC GLUCOMTR-MCNC: 125 MG/DL — HIGH (ref 70–99)
GLUCOSE BLDC GLUCOMTR-MCNC: 127 MG/DL — HIGH (ref 70–99)
GLUCOSE BLDC GLUCOMTR-MCNC: 128 MG/DL — HIGH (ref 70–99)
GLUCOSE BLDC GLUCOMTR-MCNC: 131 MG/DL — HIGH (ref 70–99)
GLUCOSE BLDC GLUCOMTR-MCNC: 132 MG/DL — HIGH (ref 70–99)
GLUCOSE BLDC GLUCOMTR-MCNC: 133 MG/DL — HIGH (ref 70–99)
GLUCOSE BLDC GLUCOMTR-MCNC: 138 MG/DL — HIGH (ref 70–99)
GLUCOSE BLDC GLUCOMTR-MCNC: 138 MG/DL — HIGH (ref 70–99)
GLUCOSE BLDC GLUCOMTR-MCNC: 140 MG/DL — HIGH (ref 70–99)
GLUCOSE BLDC GLUCOMTR-MCNC: 144 MG/DL — HIGH (ref 70–99)
GLUCOSE BLDC GLUCOMTR-MCNC: 145 MG/DL — HIGH (ref 70–99)
GLUCOSE BLDC GLUCOMTR-MCNC: 147 MG/DL — HIGH (ref 70–99)
GLUCOSE BLDC GLUCOMTR-MCNC: 150 MG/DL — HIGH (ref 70–99)
GLUCOSE BLDC GLUCOMTR-MCNC: 154 MG/DL — HIGH (ref 70–99)
GLUCOSE BLDC GLUCOMTR-MCNC: 155 MG/DL — HIGH (ref 70–99)
GLUCOSE BLDC GLUCOMTR-MCNC: 63 MG/DL — LOW (ref 70–99)
GLUCOSE BLDC GLUCOMTR-MCNC: 65 MG/DL — LOW (ref 70–99)
GLUCOSE BLDC GLUCOMTR-MCNC: 80 MG/DL — SIGNIFICANT CHANGE UP (ref 70–99)
GLUCOSE SERPL-MCNC: 122 MG/DL — HIGH (ref 70–99)
HCT VFR BLD CALC: 43.5 % — SIGNIFICANT CHANGE UP (ref 39–50)
HGB BLD-MCNC: 12.7 G/DL — LOW (ref 13–17)
IMM GRANULOCYTES NFR BLD AUTO: 0.4 % — SIGNIFICANT CHANGE UP (ref 0–0.9)
LACTATE BLDV-MCNC: 1.3 MMOL/L — SIGNIFICANT CHANGE UP (ref 0.5–2)
LYMPHOCYTES # BLD AUTO: 1.18 K/UL — SIGNIFICANT CHANGE UP (ref 1–3.3)
LYMPHOCYTES # BLD AUTO: 16.6 % — SIGNIFICANT CHANGE UP (ref 13–44)
MAGNESIUM SERPL-MCNC: 2 MG/DL — SIGNIFICANT CHANGE UP (ref 1.6–2.6)
MCHC RBC-ENTMCNC: 19.7 PG — LOW (ref 27–34)
MCHC RBC-ENTMCNC: 29.2 G/DL — LOW (ref 32–36)
MCV RBC AUTO: 67.5 FL — LOW (ref 80–100)
MONOCYTES # BLD AUTO: 0.57 K/UL — SIGNIFICANT CHANGE UP (ref 0–0.9)
MONOCYTES NFR BLD AUTO: 8 % — SIGNIFICANT CHANGE UP (ref 2–14)
NEUTROPHILS # BLD AUTO: 5.01 K/UL — SIGNIFICANT CHANGE UP (ref 1.8–7.4)
NEUTROPHILS NFR BLD AUTO: 70.7 % — SIGNIFICANT CHANGE UP (ref 43–77)
NRBC # BLD: 0 /100 WBCS — SIGNIFICANT CHANGE UP (ref 0–0)
PHOSPHATE SERPL-MCNC: 2.2 MG/DL — LOW (ref 2.5–4.5)
PLATELET # BLD AUTO: 366 K/UL — SIGNIFICANT CHANGE UP (ref 150–400)
POTASSIUM SERPL-MCNC: 3.4 MMOL/L — LOW (ref 3.5–5.3)
POTASSIUM SERPL-SCNC: 3.4 MMOL/L — LOW (ref 3.5–5.3)
PROT SERPL-MCNC: 7.3 G/DL — SIGNIFICANT CHANGE UP (ref 6–8.3)
RBC # BLD: 6.44 M/UL — HIGH (ref 4.2–5.8)
RBC # FLD: 18.2 % — HIGH (ref 10.3–14.5)
SODIUM SERPL-SCNC: 140 MMOL/L — SIGNIFICANT CHANGE UP (ref 135–145)
WBC # BLD: 7.1 K/UL — SIGNIFICANT CHANGE UP (ref 3.8–10.5)
WBC # FLD AUTO: 7.1 K/UL — SIGNIFICANT CHANGE UP (ref 3.8–10.5)

## 2025-01-05 PROCEDURE — 99291 CRITICAL CARE FIRST HOUR: CPT | Mod: GC

## 2025-01-05 PROCEDURE — 71260 CT THORAX DX C+: CPT | Mod: 26

## 2025-01-05 PROCEDURE — 99232 SBSQ HOSP IP/OBS MODERATE 35: CPT

## 2025-01-05 RX ORDER — OCTREOTIDE ACETATE 20 MG
100 KIT INTRAMUSCULAR EVERY 8 HOURS
Refills: 0 | Status: DISCONTINUED | OUTPATIENT
Start: 2025-01-05 | End: 2025-01-06

## 2025-01-05 RX ORDER — POTASSIUM CHLORIDE 600 MG/1
40 TABLET, FILM COATED, EXTENDED RELEASE ORAL ONCE
Refills: 0 | Status: COMPLETED | OUTPATIENT
Start: 2025-01-05 | End: 2025-01-05

## 2025-01-05 RX ORDER — SODIUM CHLORIDE 0.65 %
1 AEROSOL, SPRAY (ML) NASAL THREE TIMES A DAY
Refills: 0 | Status: DISCONTINUED | OUTPATIENT
Start: 2025-01-05 | End: 2025-01-10

## 2025-01-05 RX ORDER — OCTREOTIDE ACETATE 20 MG
50 KIT INTRAMUSCULAR EVERY 6 HOURS
Refills: 0 | Status: DISCONTINUED | OUTPATIENT
Start: 2025-01-05 | End: 2025-01-05

## 2025-01-05 RX ORDER — POTASSIUM CHLORIDE 600 MG/1
40 TABLET, FILM COATED, EXTENDED RELEASE ORAL ONCE
Refills: 0 | Status: DISCONTINUED | OUTPATIENT
Start: 2025-01-05 | End: 2025-01-05

## 2025-01-05 RX ORDER — SOD PHOS DI, MONO/K PHOS MONO 250 MG
1 TABLET ORAL ONCE
Refills: 0 | Status: COMPLETED | OUTPATIENT
Start: 2025-01-05 | End: 2025-01-05

## 2025-01-05 RX ORDER — POLYETHYLENE GLYCOL 3350 17 G/DOSE
17 POWDER (GRAM) ORAL DAILY
Refills: 0 | Status: DISCONTINUED | OUTPATIENT
Start: 2025-01-05 | End: 2025-01-10

## 2025-01-05 RX ORDER — DEXTROSE MONOHYDRATE 25 G/50ML
50 INJECTION, SOLUTION INTRAVENOUS ONCE
Refills: 0 | Status: COMPLETED | OUTPATIENT
Start: 2025-01-05 | End: 2025-01-05

## 2025-01-05 RX ORDER — OCTREOTIDE ACETATE 20 MG
100 KIT INTRAMUSCULAR DAILY
Refills: 0 | Status: DISCONTINUED | OUTPATIENT
Start: 2025-01-05 | End: 2025-01-05

## 2025-01-05 RX ORDER — SOD PHOS DI, MONO/K PHOS MONO 250 MG
1 TABLET ORAL EVERY 6 HOURS
Refills: 0 | Status: DISCONTINUED | OUTPATIENT
Start: 2025-01-05 | End: 2025-01-05

## 2025-01-05 RX ADMIN — Medication 1 TABLET(S): at 17:58

## 2025-01-05 RX ADMIN — Medication 150 MILLIGRAM(S): at 09:46

## 2025-01-05 RX ADMIN — SODIUM CHLORIDE 100 MILLILITER(S): 9 INJECTION, SOLUTION INTRAVENOUS at 09:09

## 2025-01-05 RX ADMIN — CHLORHEXIDINE GLUCONATE 1 APPLICATION(S): 1.2 RINSE ORAL at 05:24

## 2025-01-05 RX ADMIN — Medication 150 MILLIGRAM(S): at 01:52

## 2025-01-05 RX ADMIN — ENOXAPARIN SODIUM 40 MILLIGRAM(S): 60 INJECTION INTRAVENOUS; SUBCUTANEOUS at 13:27

## 2025-01-05 RX ADMIN — Medication 150 MILLIGRAM(S): at 17:58

## 2025-01-05 RX ADMIN — DEXTROSE MONOHYDRATE 50 MILLILITER(S): 100 INJECTION, SOLUTION INTRAVENOUS at 20:53

## 2025-01-05 RX ADMIN — Medication 1 PACKET(S): at 11:10

## 2025-01-05 RX ADMIN — POTASSIUM CHLORIDE 40 MILLIEQUIVALENT(S): 600 TABLET, FILM COATED, EXTENDED RELEASE ORAL at 13:11

## 2025-01-05 RX ADMIN — SODIUM CHLORIDE 125 MILLILITER(S): 9 INJECTION, SOLUTION INTRAVENOUS at 20:53

## 2025-01-05 RX ADMIN — Medication 7.5 MILLIGRAM(S): at 05:23

## 2025-01-05 RX ADMIN — OCTREOTIDE ACETATE 50 MICROGRAM(S): KIT INTRAMUSCULAR at 20:06

## 2025-01-05 RX ADMIN — Medication 1 TABLET(S): at 01:52

## 2025-01-05 RX ADMIN — DEXTROSE MONOHYDRATE 50 MILLILITER(S): 25 INJECTION, SOLUTION INTRAVENOUS at 20:35

## 2025-01-05 RX ADMIN — OCTREOTIDE ACETATE 100 MICROGRAM(S): KIT INTRAMUSCULAR at 11:05

## 2025-01-05 NOTE — DIETITIAN INITIAL EVALUATION ADULT - NSFNSGIIOFT_GEN_A_CORE
had 3-4 days of watery diarrhea in [place] PTA. likely viral gastroenteritis per team. now resolved.

## 2025-01-05 NOTE — PROGRESS NOTE ADULT - ASSESSMENT
44 M with pmhx of HTN who initially presented after experiencing generalized weakness and confusion who was admitted to MICU for significant and persistent hypoglycemia    Neuro:  - AOX 3, mental status at baseline. Initially lethargic 2/2 to hypoglycemia   CTH showed no acute intracranial hemorrhage, mass effect, or midline shift.    Cardiovascular  # Hypertension  - at home on amlodipine 7.5mg daily   - can continue for now     Respiratory  # non-labored breathing, satting 95% on room air   - no active issues     GI/Nutrition  # Diarrhea   - had 3-4 days of watery diarrhea in [place]  - other close contacts had similar symptoms, has resolved  - likely viral gastroenteritis, less likely VIPoma given self resolving nature of diarrhea and close contacts with similar symptoms   - can consider GI PCR if diarrhea recurs     #Nutrition  - DASH, TLC    /Renal  - no active issues     ID  - patient afebrile, no leukocytosis   - f/u urine cultures     Endocrine  #Hypoglycemia  #Insulinoma   CT abdomen and pelvis showing lesions in pancreatic tail along with persistent hypoglycemia c/f Insulinoma  - Surg onc consulted   - c/w diazoxide per endo   - continue with q1 FS for now   - continue with 2 IVs running D10 at 100cc/hr, if needed will consider D20 drip but patient refuses central line  - started octreotide 50mg qd + every 6 hours prn  - f/u hypoglycemic labs   - endocrine following, recs appreciated      Hematology   #DVT ppx   - Lovenox     Oncology  #pancreatic tail mass  #insulinoma   CT Abdomen and pelvis showing Lobulated 4.3 cm enhancing pancreatic tail mass, suspicious for a neuroendocrine tumor given the patient's clinical history. Additional 2.1 cm indeterminate hypoattenuating lesion in the pancreatic body.   MR abdomen and MRCP ordered for further Consider contrast enhanced MRI of the abdomen/MRCP for further evaluation.  - Surg onc consulted   - advanced GI consulted for U/S   - CT chest w/ IV con for mets screening   - f/u gastrin, vasoactive intestinal peptide, chromogranin A, and c-peptide levels    44 M with pmhx of HTN who initially presented after experiencing generalized weakness and confusion who was admitted to MICU for significant and persistent hypoglycemia    Neuro:  - AOX 3, mental status at baseline. Initially lethargic 2/2 to hypoglycemia   CTH showed no acute intracranial hemorrhage, mass effect, or midline shift.    Cardiovascular  # Hypertension  - at home on amlodipine 7.5mg daily   - can continue for now     Respiratory  # non-labored breathing, satting 95% on room air   - no active issues     GI/Nutrition  # Diarrhea   - had 3-4 days of watery diarrhea in [place]  - other close contacts had similar symptoms, has resolved  - likely viral gastroenteritis, less likely VIPoma given self resolving nature of diarrhea and close contacts with similar symptoms   - can consider GI PCR if diarrhea recurs     #Nutrition  - DASH, TLC    /Renal  - no active issues     ID  - patient afebrile, no leukocytosis   - f/u urine cultures     Endocrine  #Hypoglycemia  #Insulinoma   CT abdomen and pelvis showing lesions in pancreatic tail along with persistent hypoglycemia c/f Insulinoma  - Surg onc consulted   - c/w diazoxide per endo   - continue with q1 FS for now   - continue with 2 IVs running D10 at 100cc/hr, if needed will consider D20 drip but patient refuses central line  - started octreotide 100mg qd + every 6 hours prn  - f/u hypoglycemic labs   - endocrine following, recs appreciated      Hematology   #DVT ppx   - Lovenox     Oncology  #pancreatic tail mass  #insulinoma   CT Abdomen and pelvis showing Lobulated 4.3 cm enhancing pancreatic tail mass, suspicious for a neuroendocrine tumor given the patient's clinical history. Additional 2.1 cm indeterminate hypoattenuating lesion in the pancreatic body.   MR abdomen and MRCP ordered for further Consider contrast enhanced MRI of the abdomen/MRCP for further evaluation.  - Surg onc consulted   - advanced GI consulted for U/S   - CT chest w/ IV con for mets screening ->> expedited on 1/5/25  -->> MR read expedited 1/5/25  - f/u gastrin, vasoactive intestinal peptide, chromogranin A, and c-peptide levels

## 2025-01-05 NOTE — PROGRESS NOTE ADULT - ASSESSMENT
44 M with pmhx of HTN who initially presented after experiencing generalized weakness and confusion who was admitted to MICU for significant and persistent hypoglycemia. CT showing 4x3cm lesion in tail of pancreas and 2x2cm lesion in body. Patient with persistent hypoglycemia despite D10 administration. Patient also with elevated c-peptide of 5.6. 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 Surgery, [id number]

## 2025-01-05 NOTE — DIETITIAN INITIAL EVALUATION ADULT - PERTINENT LABORATORY DATA
01-04    140  |  105  |  13  ----------------------------<  136[H]  3.8   |  23  |  0.81    Ca    8.6      04 Jan 2025 19:58  Phos  2.2     01-04  Mg     1.9     01-04    TPro  7.1  /  Alb  3.7  /  TBili  0.3  /  DBili  x   /  AST  20  /  ALT  20  /  AlkPhos  71  01-04  POCT Blood Glucose.: 120 mg/dL (01-05-25 @ 09:00)  A1C with Estimated Average Glucose Result: 4.9 % (01-03-25 @ 06:20)

## 2025-01-05 NOTE — PROGRESS NOTE ADULT - SUBJECTIVE AND OBJECTIVE BOX
*******************************  Pavel Avalos MD (PGY-1)  Internal Medicine  Contact via [employer] TEAMS    *******************************    INTERVAL HPI/OVERNIGHT EVENTS: No acute events overnight.    SUBJECTIVE: Patient seen and examined at bedside.     MEDICATIONS  (STANDING):  amLODIPine   Tablet 7.5 milliGRAM(s) Oral daily  chlorhexidine 2% Cloths 1 Application(s) Topical <User Schedule>  dextrose 10% + sodium chloride 0.9%. 1000 milliLiter(s) (125 mL/Hr) IV Continuous <Continuous>  dextrose 10%. 1000 milliLiter(s) (125 mL/Hr) IV Continuous <Continuous>  diazoxide Suspension 150 milliGRAM(s) Oral every 8 hours  enoxaparin Injectable 40 milliGRAM(s) SubCutaneous every 24 hours  polyethylene glycol 3350 17 Gram(s) Oral daily    MEDICATIONS  (PRN):  octreotide  Injectable 50 MICROGram(s) IV Push every 6 hours PRN hypoglycemia      ALLERGIES:  Allergies    No Known Allergies    Intolerances        VITAL SIGNS:  ICU Vital Signs Last 24 Hrs  T(C): 36.6 (05 Jan 2025 04:00), Max: 36.7 (04 Jan 2025 20:00)  T(F): 97.8 (05 Jan 2025 04:00), Max: 98 (04 Jan 2025 20:00)  HR: 92 (05 Jan 2025 07:00) (78 - 105)  BP: 173/97 (05 Jan 2025 07:00) (136/70 - 185/85)  BP(mean): 128 (05 Jan 2025 07:00) (92 - 128)  ABP: --  ABP(mean): --  RR: 18 (05 Jan 2025 07:00) (12 - 28)  SpO2: 96% (05 Jan 2025 07:00) (95% - 100%)    O2 Parameters below as of 04 Jan 2025 20:00  Patient On (Oxygen Delivery Method): room air            Plateau pressure:   P/F ratio:     01-04 @ 07:01  -  01-05 @ 07:00  --------------------------------------------------------  IN: 5912 mL / OUT: 5400 mL / NET: 512 mL      CAPILLARY BLOOD GLUCOSE      POCT Blood Glucose.: 132 mg/dL (05 Jan 2025 06:54)    ECG:    PHYSICAL EXAM:  GENERAL: NAD, lying in bed comfortably  HEAD:  Atraumatic, normocephalic  EYES: EOMI, PERRLA, conjunctiva and sclera clear  ENT: Moist mucous membranes  NECK: Supple, no JVD  HEART: S1, S2, regular rate and rhythm, no murmurs, rubs, or gallops  LUNGS: Unlabored respirations.  Clear to auscultation bilaterally, no crackles, wheezing, or rhonchi  ABDOMEN: Soft, nontender, nondistended, +BS  EXTREMITIES: 2+ peripheral pulses bilaterally. No clubbing, cyanosis, or edema  NERVOUS SYSTEM:  A&Ox3, no focal deficits   SKIN: No rashes or lesions  LINES:     LABS:                        12.4   8.73  )-----------( 354      ( 04 Jan 2025 19:58 )             41.0     01-04    140  |  105  |  13  ----------------------------<  136[H]  3.8   |  23  |  0.81    Ca    8.6      04 Jan 2025 19:58  Phos  2.2     01-04  Mg     1.9     01-04    TPro  7.1  /  Alb  3.7  /  TBili  0.3  /  DBili  x   /  AST  20  /  ALT  20  /  AlkPhos  71  01-04    PT/INR - ( 03 Jan 2025 23:38 )   PT: 14.0 sec;   INR: 1.22 ratio         PTT - ( 03 Jan 2025 23:38 )  PTT:32.9 sec  Urinalysis Basic - ( 04 Jan 2025 19:58 )    Color: x / Appearance: x / SG: x / pH: x  Gluc: 136 mg/dL / Ketone: x  / Bili: x / Urobili: x   Blood: x / Protein: x / Nitrite: x   Leuk Esterase: x / RBC: x / WBC x   Sq Epi: x / Non Sq Epi: x / Bacteria: x        RADIOLOGY & ADDITIONAL TESTS:

## 2025-01-05 NOTE — CHART NOTE - NSCHARTNOTEFT_GEN_A_CORE
HPI:    Patient is a 44y old  Male who presents with a chief complaint of Hypoglycemia (05 Jan 2025 09:54)      INTERVAL HPI/OVERNIGHT EVENTS:  No hypoglycemia overnight, remains on 2 D10 IVF infusions  Started octreotide    CAPILLARY BLOOD GLUCOSE      POCT Blood Glucose.: 155 mg/dL (05 Jan 2025 14:00)  POCT Blood Glucose.: 154 mg/dL (05 Jan 2025 13:09)  POCT Blood Glucose.: 125 mg/dL (05 Jan 2025 12:11)  POCT Blood Glucose.: 111 mg/dL (05 Jan 2025 11:01)  POCT Blood Glucose.: 113 mg/dL (05 Jan 2025 10:05)  POCT Blood Glucose.: 120 mg/dL (05 Jan 2025 09:00)  POCT Blood Glucose.: 133 mg/dL (05 Jan 2025 08:02)  POCT Blood Glucose.: 132 mg/dL (05 Jan 2025 06:54)  POCT Blood Glucose.: 145 mg/dL (05 Jan 2025 05:58)  POCT Blood Glucose.: 127 mg/dL (05 Jan 2025 05:10)  POCT Blood Glucose.: 131 mg/dL (05 Jan 2025 04:02)  POCT Blood Glucose.: 138 mg/dL (05 Jan 2025 02:53)  POCT Blood Glucose.: 150 mg/dL (05 Jan 2025 01:50)  POCT Blood Glucose.: 144 mg/dL (05 Jan 2025 00:52)  POCT Blood Glucose.: 134 mg/dL (04 Jan 2025 23:57)  POCT Blood Glucose.: 142 mg/dL (04 Jan 2025 22:51)  POCT Blood Glucose.: 137 mg/dL (04 Jan 2025 21:56)  POCT Blood Glucose.: 130 mg/dL (04 Jan 2025 20:56)  POCT Blood Glucose.: 123 mg/dL (04 Jan 2025 19:51)  POCT Blood Glucose.: 103 mg/dL (04 Jan 2025 18:52)  POCT Blood Glucose.: 121 mg/dL (04 Jan 2025 18:11)  POCT Blood Glucose.: 98 mg/dL (04 Jan 2025 17:12)  POCT Blood Glucose.: 116 mg/dL (04 Jan 2025 16:38)  POCT Blood Glucose.: 52 mg/dL (04 Jan 2025 16:19)  POCT Blood Glucose.: 51 mg/dL (04 Jan 2025 16:17)  POCT Blood Glucose.: 121 mg/dL (04 Jan 2025 15:39)  POCT Blood Glucose.: 56 mg/dL (04 Jan 2025 15:07)       MEDICATIONS  (STANDING):  amLODIPine   Tablet 7.5 milliGRAM(s) Oral daily  chlorhexidine 2% Cloths 1 Application(s) Topical <User Schedule>  dextrose 10% + sodium chloride 0.9%. 1000 milliLiter(s) (100 mL/Hr) IV Continuous <Continuous>  dextrose 10%. 1000 milliLiter(s) (50 mL/Hr) IV Continuous <Continuous>  diazoxide Suspension 150 milliGRAM(s) Oral every 8 hours  enoxaparin Injectable 40 milliGRAM(s) SubCutaneous every 24 hours  octreotide  Injectable 100 MICROGram(s) IV Push daily  polyethylene glycol 3350 17 Gram(s) Oral daily  potassium phosphate / sodium phosphate Tablet (K-PHOS No. 2) 1 Tablet(s) Oral once    MEDICATIONS  (PRN):  octreotide  Injectable 50 MICROGram(s) IV Push every 6 hours PRN hypoglycemia <70      PHYSICAL EXAM  Vital Signs Last 24 Hrs  T(C): 36.6 (05 Jan 2025 12:00), Max: 36.7 (04 Jan 2025 20:00)  T(F): 97.9 (05 Jan 2025 12:00), Max: 98 (04 Jan 2025 20:00)  HR: 90 (05 Jan 2025 14:00) (79 - 105)  BP: 138/66 (05 Jan 2025 14:00) (135/65 - 185/85)  BP(mean): 95 (05 Jan 2025 14:00) (92 - 128)  RR: 18 (05 Jan 2025 14:00) (14 - 28)  SpO2: 95% (05 Jan 2025 14:00) (95% - 100%)    Parameters below as of 04 Jan 2025 20:00  Patient On (Oxygen Delivery Method): room air        GENERAL: feMale laying in bed in NAD  RESPIRATORY: nonlabored breathing, no accessory muscle use  Extremities: Warm, no edema in all 4 exts   NEURO: A&O X3    LABS:                        12.7   7.10  )-----------( 366      ( 05 Jan 2025 10:11 )             43.5     01-05    140  |  105  |  5[L]  ----------------------------<  122[H]  3.4[L]   |  23  |  0.70    Ca    8.2[L]      05 Jan 2025 10:11  Phos  2.2     01-05  Mg     2.0     01-05    TPro  7.3  /  Alb  3.7  /  TBili  0.4  /  DBili  x   /  AST  11  /  ALT  21  /  AlkPhos  68  01-05    PT/INR - ( 03 Jan 2025 23:38 )   PT: 14.0 sec;   INR: 1.22 ratio         PTT - ( 03 Jan 2025 23:38 )  PTT:32.9 sec  Urinalysis Basic - ( 05 Jan 2025 10:11 )    Color: x / Appearance: x / SG: x / pH: x  Gluc: 122 mg/dL / Ketone: x  / Bili: x / Urobili: x   Blood: x / Protein: x / Nitrite: x   Leuk Esterase: x / RBC: x / WBC x   Sq Epi: x / Non Sq Epi: x / Bacteria: x      Thyroid Stimulating Hormone, Serum: 3.50 uIU/mL (01-03 @ 04:55)  Thyroid Stimulating Hormone, Serum: 1.60 uIU/mL (01-02 @ 15:54)           A/P: Patient is a 44 year old male with past medical history of hypertension who presented to the hospital with hypoglycemia. 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    #Hypoglycemia  - glucose 39 on initial BMP, 46 on most recent fingerstick  - Unclear if patient is meeting Whipple's triad at this time as he does not have classic hypoglycemia symptoms and has not been adequately treated to bring his glucose back up.   - CT abd/pel with PANCREAS: Lobulated enhancing mass at the pancreatic tail measuring 4.3 x 3.7 cm, with abutment of the left kidney at the posterior margin. Additional hypoattenuating lesion in the pancreatic body measuring 2.1 x 1.8 cm.  ADRENALS: Nonspecific mildly thickened bilateral adrenal glands.  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 DDx also includes other causes of insulin-mediated hypoglycemia (i.e insulin antibodies), medication  - AM cortisol 12.6- 1/4/25, ruling out adrenal insufficiency  Recommendations:  - Continue diazoxide 150mg q8h  - Agree with starting octreotide 100 mcg daily and PRN 50 mcg q6h   - continue D10, he has D10 100cc/hr and D10+NS 100cc/hr  - continue to check FSG q4h for now  - hypoglycemia protocol  - Obtain CT chest with IV contrast to evaluate for mets  - follow up sulfonylurea panel, insulin antibodies.   - follow up neuroendocrine tumor markers as baseline: chromogranin A, gastrin, VIP level  - Appreciate surg onc consult, agree with GI consult for EUS. Recommend first line treatment of surgical resection for treatment of insulinoma  - Outpatient genetic testing    #HTN  Management per primary team, currently on amlodipine 7.5 mg qd        Thank you for the consult. Will continue to monitor. Please inform the endocrinology team at least 24 hours prior to intended discharge date.     Contact via pager or [employer] Teams during business hours. For follow up questions, discharge recommendations, or new consults please call answering service at [phone] (weekdays), [phone] (nights/weekends). For nonurgent matters, please email  [email or web address].        Zayda Morgan D.O.  Attending Physician   Department of Endocrinology, Diabetes and Metabolism   [employer] Teams     For urgent matters before 9AM or after 5PM, or on weekends/holidays, please page the on call endocrine fellow.   For nonurgent matters, please email [email or web address] for assistance. HPI:    Patient is a 44y old  Male who presents with a chief complaint of Hypoglycemia (05 Jan 2025 09:54)      INTERVAL HPI/OVERNIGHT EVENTS:  No hypoglycemia overnight, remains on 2 D10 IVF infusions  Started octreotide    CAPILLARY BLOOD GLUCOSE      POCT Blood Glucose.: 155 mg/dL (05 Jan 2025 14:00)  POCT Blood Glucose.: 154 mg/dL (05 Jan 2025 13:09)  POCT Blood Glucose.: 125 mg/dL (05 Jan 2025 12:11)  POCT Blood Glucose.: 111 mg/dL (05 Jan 2025 11:01)  POCT Blood Glucose.: 113 mg/dL (05 Jan 2025 10:05)  POCT Blood Glucose.: 120 mg/dL (05 Jan 2025 09:00)  POCT Blood Glucose.: 133 mg/dL (05 Jan 2025 08:02)  POCT Blood Glucose.: 132 mg/dL (05 Jan 2025 06:54)  POCT Blood Glucose.: 145 mg/dL (05 Jan 2025 05:58)  POCT Blood Glucose.: 127 mg/dL (05 Jan 2025 05:10)  POCT Blood Glucose.: 131 mg/dL (05 Jan 2025 04:02)  POCT Blood Glucose.: 138 mg/dL (05 Jan 2025 02:53)  POCT Blood Glucose.: 150 mg/dL (05 Jan 2025 01:50)  POCT Blood Glucose.: 144 mg/dL (05 Jan 2025 00:52)  POCT Blood Glucose.: 134 mg/dL (04 Jan 2025 23:57)  POCT Blood Glucose.: 142 mg/dL (04 Jan 2025 22:51)  POCT Blood Glucose.: 137 mg/dL (04 Jan 2025 21:56)  POCT Blood Glucose.: 130 mg/dL (04 Jan 2025 20:56)  POCT Blood Glucose.: 123 mg/dL (04 Jan 2025 19:51)  POCT Blood Glucose.: 103 mg/dL (04 Jan 2025 18:52)  POCT Blood Glucose.: 121 mg/dL (04 Jan 2025 18:11)  POCT Blood Glucose.: 98 mg/dL (04 Jan 2025 17:12)  POCT Blood Glucose.: 116 mg/dL (04 Jan 2025 16:38)  POCT Blood Glucose.: 52 mg/dL (04 Jan 2025 16:19)  POCT Blood Glucose.: 51 mg/dL (04 Jan 2025 16:17)  POCT Blood Glucose.: 121 mg/dL (04 Jan 2025 15:39)  POCT Blood Glucose.: 56 mg/dL (04 Jan 2025 15:07)       MEDICATIONS  (STANDING):  amLODIPine   Tablet 7.5 milliGRAM(s) Oral daily  chlorhexidine 2% Cloths 1 Application(s) Topical <User Schedule>  dextrose 10% + sodium chloride 0.9%. 1000 milliLiter(s) (100 mL/Hr) IV Continuous <Continuous>  dextrose 10%. 1000 milliLiter(s) (50 mL/Hr) IV Continuous <Continuous>  diazoxide Suspension 150 milliGRAM(s) Oral every 8 hours  enoxaparin Injectable 40 milliGRAM(s) SubCutaneous every 24 hours  octreotide  Injectable 100 MICROGram(s) IV Push daily  polyethylene glycol 3350 17 Gram(s) Oral daily  potassium phosphate / sodium phosphate Tablet (K-PHOS No. 2) 1 Tablet(s) Oral once    MEDICATIONS  (PRN):  octreotide  Injectable 50 MICROGram(s) IV Push every 6 hours PRN hypoglycemia <70    LABS:                        12.7   7.10  )-----------( 366      ( 05 Jan 2025 10:11 )             43.5     01-05    140  |  105  |  5[L]  ----------------------------<  122[H]  3.4[L]   |  23  |  0.70    Ca    8.2[L]      05 Jan 2025 10:11  Phos  2.2     01-05  Mg     2.0     01-05    TPro  7.3  /  Alb  3.7  /  TBili  0.4  /  DBili  x   /  AST  11  /  ALT  21  /  AlkPhos  68  01-05    PT/INR - ( 03 Jan 2025 23:38 )   PT: 14.0 sec;   INR: 1.22 ratio         PTT - ( 03 Jan 2025 23:38 )  PTT:32.9 sec  Urinalysis Basic - ( 05 Jan 2025 10:11 )    Color: x / Appearance: x / SG: x / pH: x  Gluc: 122 mg/dL / Ketone: x  / Bili: x / Urobili: x   Blood: x / Protein: x / Nitrite: x   Leuk Esterase: x / RBC: x / WBC x   Sq Epi: x / Non Sq Epi: x / Bacteria: x      Thyroid Stimulating Hormone, Serum: 3.50 uIU/mL (01-03 @ 04:55)  Thyroid Stimulating Hormone, Serum: 1.60 uIU/mL (01-02 @ 15:54)           A/P: Patient is a 44 year old male with past medical history of hypertension who presented to the hospital with hypoglycemia. 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    #Hypoglycemia  - glucose 39 on initial BMP, 46 on most recent fingerstick  - Unclear if patient is meeting Whipple's triad at this time as he does not have classic hypoglycemia symptoms and has not been adequately treated to bring his glucose back up.   - CT abd/pel with PANCREAS: Lobulated enhancing mass at the pancreatic tail measuring 4.3 x 3.7 cm, with abutment of the left kidney at the posterior margin. Additional hypoattenuating lesion in the pancreatic body measuring 2.1 x 1.8 cm.  ADRENALS: Nonspecific mildly thickened bilateral adrenal glands.  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 DDx also includes other causes of insulin-mediated hypoglycemia (i.e insulin antibodies), medication  - AM cortisol 12.6- 1/4/25, ruling out adrenal insufficiency  Recommendations:  - Continue diazoxide 150mg q8h  - Agree with starting octreotide 100 mcg daily and PRN 50 mcg q6h   - continue D10, he has D10 100cc/hr and D10+NS 100cc/hr  - continue to check FSG q4h for now  - hypoglycemia protocol  - Obtain CT chest with IV contrast to evaluate for mets  - f/u official read of MRI abdomen  - follow up sulfonylurea panel, insulin antibodies.   - follow up neuroendocrine tumor markers as baseline: chromogranin A, gastrin, VIP level  - Appreciate surg onc consult, agree with GI consult for EUS. Recommend first line treatment of surgical resection for treatment of insulinoma  - Outpatient genetic testing    #HTN  Management per primary team, currently on amlodipine 7.5 mg qd        Thank you for the consult. Will continue to monitor. Please inform the endocrinology team at least 24 hours prior to intended discharge date.     Contact via pager or [employer] Teams during business hours. For follow up questions, discharge recommendations, or new consults please call answering service at [phone] (weekdays), [phone] (nights/weekends). For nonurgent matters, please email  [email or web address].        Zayda Morgan D.O.  Attending Physician   Department of Endocrinology, Diabetes and Metabolism   [employer] Teams     For urgent matters before 9AM or after 5PM, or on weekends/holidays, please page the on call endocrine fellow.   For nonurgent matters, please email [email or web address] for assistance.

## 2025-01-05 NOTE — PROGRESS NOTE ADULT - SUBJECTIVE AND OBJECTIVE BOX
[facility] Team Surgery Daily Progress Note    24h Events:   - Overnight, no acute events    Subjective:   Patient seen at bedside this AM.     Objective:  Vital Signs  T(C): 36.7 (01-05 @ 00:00), Max: 36.9 (01-04 @ 04:00)  HR: 86 (01-05 @ 03:00) (73 - 105)  BP: 167/95 (01-05 @ 03:00) (136/70 - 185/85)  RR: 23 (01-05 @ 03:00) (12 - 28)  SpO2: 98% (01-05 @ 03:00) (95% - 100%)  01-03-25 @ 07:01  -  01-04-25 @ 07:00  --------------------------------------------------------  IN:  Total IN: 0 mL    OUT:    Voided (mL): 3300 mL  Total OUT: 3300 mL    Total NET: -3300 mL      01-04-25 @ 07:01  -  01-05-25 @ 03:48  --------------------------------------------------------  IN:  Total IN: 0 mL    OUT:    Voided (mL): 3400 mL  Total OUT: 3400 mL    Total NET: -3400 mL      Physical Exam:  General: WN/WD NAD  Neurology: A&Ox3, nonfocal, follows commands  Respiratory: CTA B/L  CV: Normal rate regular rhythm  Abdominal: Soft, NT, ND  Extremities: No edema      Labs:                        12.4   8.73  )-----------( 354      ( 04 Jan 2025 19:58 )             41.0   01-04    140  |  105  |  13  ----------------------------<  136[H]  3.8   |  23  |  0.81    Ca    8.6      04 Jan 2025 19:58  Phos  2.2     01-04  Mg     1.9     01-04    TPro  7.1  /  Alb  3.7  /  TBili  0.3  /  DBili  x   /  AST  20  /  ALT  20  /  AlkPhos  71  01-04    CAPILLARY BLOOD GLUCOSE      POCT Blood Glucose.: 138 mg/dL (05 Jan 2025 02:53)  POCT Blood Glucose.: 150 mg/dL (05 Jan 2025 01:50)  POCT Blood Glucose.: 144 mg/dL (05 Jan 2025 00:52)  POCT Blood Glucose.: 134 mg/dL (04 Jan 2025 23:57)  POCT Blood Glucose.: 142 mg/dL (04 Jan 2025 22:51)  POCT Blood Glucose.: 137 mg/dL (04 Jan 2025 21:56)  POCT Blood Glucose.: 130 mg/dL (04 Jan 2025 20:56)  POCT Blood Glucose.: 123 mg/dL (04 Jan 2025 19:51)  POCT Blood Glucose.: 103 mg/dL (04 Jan 2025 18:52)  POCT Blood Glucose.: 121 mg/dL (04 Jan 2025 18:11)  POCT Blood Glucose.: 98 mg/dL (04 Jan 2025 17:12)  POCT Blood Glucose.: 116 mg/dL (04 Jan 2025 16:38)  POCT Blood Glucose.: 52 mg/dL (04 Jan 2025 16:19)  POCT Blood Glucose.: 51 mg/dL (04 Jan 2025 16:17)  POCT Blood Glucose.: 121 mg/dL (04 Jan 2025 15:39)  POCT Blood Glucose.: 56 mg/dL (04 Jan 2025 15:07)  POCT Blood Glucose.: 80 mg/dL (04 Jan 2025 13:06)  POCT Blood Glucose.: 61 mg/dL (04 Jan 2025 12:01)  POCT Blood Glucose.: 79 mg/dL (04 Jan 2025 11:02)  POCT Blood Glucose.: 91 mg/dL (04 Jan 2025 10:05)  POCT Blood Glucose.: 89 mg/dL (04 Jan 2025 08:39)  POCT Blood Glucose.: 91 mg/dL (04 Jan 2025 07:54)  POCT Blood Glucose.: 448 mg/dL (04 Jan 2025 07:52)  POCT Blood Glucose.: 94 mg/dL (04 Jan 2025 06:40)  POCT Blood Glucose.: 113 mg/dL (04 Jan 2025 05:37)  POCT Blood Glucose.: 100 mg/dL (04 Jan 2025 04:37)      Medications:   MEDICATIONS  (STANDING):  amLODIPine   Tablet 7.5 milliGRAM(s) Oral daily  chlorhexidine 2% Cloths 1 Application(s) Topical <User Schedule>  dextrose 10% + sodium chloride 0.9%. 1000 milliLiter(s) (125 mL/Hr) IV Continuous <Continuous>  dextrose 10%. 1000 milliLiter(s) (125 mL/Hr) IV Continuous <Continuous>  diazoxide Suspension 150 milliGRAM(s) Oral every 8 hours  enoxaparin Injectable 40 milliGRAM(s) SubCutaneous every 24 hours  polyethylene glycol 3350 17 Gram(s) Oral daily    MEDICATIONS  (PRN):  octreotide  Injectable 50 MICROGram(s) IV Push every 6 hours PRN hypoglycemia      Imaging:

## 2025-01-05 NOTE — CONSULT NOTE ADULT - SUBJECTIVE AND OBJECTIVE BOX
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 Advanced GI consulted for EUS/FNA of pancreatic mass seen on imaging.     Patient's symptoms started on 12/30 while he was away in [place]. He was experiencing 4 episodes of diarrhea at the time. However, he attributed his symptoms to gastroenteritis as his family members were experiencing similar symptoms. He returned to [place] and was found to be experiencing confusion as he was spraying the ceiling with water. He went to urgent care and was then recommended to come to the hospital. He has never experienced symptoms like this prior. His ROS otherwise is notable for a history of night sweats and he is unsure if he has experienced unintentional weight loss. His last episode of diarrhea was also the day prior to coming to the hospital.     In the ED, his vitals were wnl and his labs were significant for persistent hypoglycemia and hypoglycemia. A CT A/P was also obtained that showed lobulated 4.3 cm enhancing pancreatic tail mass and  2.1 cm indeterminate hypoattenuating lesion in the pancreatic body. Subsequent MRI/MRCP showed 4.3 cm arterially hyperenhancing pancreatic tail mass and a 2.1 x 2.4 cm septated cystic lesion within the pancreatic body without appreciable solid component. Currently followed by endocrinology and surgical oncology and in the MICU for close glucose monitoring. Given persistent hypoglycemia with elevated C-peptide 5.6 and insulin 27.6 as well as CT finding of pancreatic mass, findings consistent with insulinoma per endocrinology. Surgical oncology requesting EUS/FNB of pancreatic mass prior to proceeding w/ distal pancreatectomy.     PAST MEDICAL & SURGICAL HISTORY:  HTN (hypertension)        FAMILY HISTORY:      MEDS:  MEDICATIONS  (STANDING):  amLODIPine   Tablet 7.5 milliGRAM(s) Oral daily  chlorhexidine 2% Cloths 1 Application(s) Topical <User Schedule>  dextrose 10% + sodium chloride 0.9%. 1000 milliLiter(s) (125 mL/Hr) IV Continuous <Continuous>  dextrose 10%. 1000 milliLiter(s) (50 mL/Hr) IV Continuous <Continuous>  diazoxide Suspension 150 milliGRAM(s) Oral every 8 hours  enoxaparin Injectable 40 milliGRAM(s) SubCutaneous every 24 hours  octreotide  Injectable 100 MICROGram(s) IV Push every 8 hours  polyethylene glycol 3350 17 Gram(s) Oral daily    MEDICATIONS  (PRN):    Allergies    No Known Allergies    Intolerances      ______________________________________________________________________  PHYSICAL EXAM:  T(C): 36.9 (01-05-25 @ 16:00), Max: 36.9 (01-05-25 @ 16:00)  HR: 98 (01-05-25 @ 20:00)  BP: 120/65 (01-05-25 @ 20:00)  RR: 15 (01-05-25 @ 20:00)  SpO2: 94% (01-05-25 @ 20:00)  Wt(kg): --    01-04 - 01-05  --------------------------------------------------------  IN:    dextrose 10%: 2675 mL    dextrose 10% + sodium chloride 0.9%: 2675 mL    Oral Fluid: 562 mL  Total IN: 5912 mL    OUT:    Voided (mL): 5400 mL  Total OUT: 5400 mL    Total NET: 512 mL      01-05 - 01-05  --------------------------------------------------------  IN:    dextrose 10%: 875 mL    dextrose 10% + sodium chloride 0.9%: 1500 mL    Oral Fluid: 854 mL  Total IN: 3229 mL    OUT:    Voided (mL): 2050 mL  Total OUT: 2050 mL    Total NET: 1179 mL          GEN: NAD, normocephalic  CVS: S1S2+  CHEST: clear to auscultation  ABD: soft , nontender, nondistended, bowel sounds present  EXTR: no cyanosis, no clubbing, no edema  NEURO: A&OX3  SKIN:  warm;  non icteric    ______________________________________________________________________  LABS:                        12.7   7.10  )-----------( 366      ( 05 Jan 2025 10:11 )             43.5     01-05    140  |  105  |  5[L]  ----------------------------<  122[H]  3.4[L]   |  23  |  0.70    Ca    8.2[L]      05 Jan 2025 10:11  Phos  2.2     01-05  Mg     2.0     01-05    TPro  7.3  /  Alb  3.7  /  TBili  0.4  /  DBili  x   /  AST  11  /  ALT  21  /  AlkPhos  68  01-05    LIVER FUNCTIONS - ( 05 Jan 2025 10:11 )  Alb: 3.7 g/dL / Pro: 7.3 g/dL / ALK PHOS: 68 U/L / ALT: 21 U/L / AST: 11 U/L / GGT: x           PT/INR - ( 03 Jan 2025 23:38 )   PT: 14.0 sec;   INR: 1.22 ratio         PTT - ( 03 Jan 2025 23:38 )  PTT:32.9 sec  ____________________________________________

## 2025-01-05 NOTE — DIETITIAN INITIAL EVALUATION ADULT - PERTINENT MEDS FT
MEDICATIONS  (STANDING):  amLODIPine   Tablet 7.5 milliGRAM(s) Oral daily  chlorhexidine 2% Cloths 1 Application(s) Topical <User Schedule>  dextrose 10% + sodium chloride 0.9%. 1000 milliLiter(s) (100 mL/Hr) IV Continuous <Continuous>  dextrose 10%. 1000 milliLiter(s) (125 mL/Hr) IV Continuous <Continuous>  diazoxide Suspension 150 milliGRAM(s) Oral every 8 hours  enoxaparin Injectable 40 milliGRAM(s) SubCutaneous every 24 hours  octreotide  Injectable 100 MICROGram(s) IV Push daily  polyethylene glycol 3350 17 Gram(s) Oral daily    MEDICATIONS  (PRN):  octreotide  Injectable 50 MICROGram(s) IV Push every 6 hours PRN hypoglycemia <70

## 2025-01-05 NOTE — CONSULT NOTE ADULT - ASSESSMENT
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 Advanced GI consulted for EUS/FNA of pancreatic mass seen on imaging.     #Concerns for insulinoma  #4.3cm distal pancreatic tail mass  #2.4cm septated cystic lesion in pancreatic body - ddx: mucinous cystadenoma, side branch intraductal papillary mucinous neoplasm (IPMN), or pseudocyst.    Recommendations:   - will discuss w/ advanced GI team tomorrow re timing of EUS and FNB  - will need endo recs for lucy-operative mgmt of persistent hypoglycemia    All recs are preliminary until attending attestation.     Jono Collins, PGY-4  Gastroenterology & Hepatology Fellow  Available on TEAMS  Long range pager #: [phone]  Short range pager#: [phone]    For non-urgent consults, please send email to [email or web address] (for [email or web address]) or [email or web address] (for [email or web address])

## 2025-01-06 DIAGNOSIS — D13.7 BENIGN NEOPLASM OF ENDOCRINE PANCREAS: ICD-10-CM

## 2025-01-06 DIAGNOSIS — K86.2 CYST OF PANCREAS: ICD-10-CM

## 2025-01-06 LAB
ALBUMIN SERPL ELPH-MCNC: 3.6 G/DL — SIGNIFICANT CHANGE UP (ref 3.3–5)
ALBUMIN SERPL ELPH-MCNC: 3.9 G/DL — SIGNIFICANT CHANGE UP (ref 3.3–5)
ALP SERPL-CCNC: 62 U/L — SIGNIFICANT CHANGE UP (ref 40–120)
ALP SERPL-CCNC: 68 U/L — SIGNIFICANT CHANGE UP (ref 40–120)
ALT FLD-CCNC: 18 U/L — SIGNIFICANT CHANGE UP (ref 10–45)
ALT FLD-CCNC: 20 U/L — SIGNIFICANT CHANGE UP (ref 10–45)
ANION GAP SERPL CALC-SCNC: 12 MMOL/L — SIGNIFICANT CHANGE UP (ref 5–17)
ANION GAP SERPL CALC-SCNC: 12 MMOL/L — SIGNIFICANT CHANGE UP (ref 5–17)
AST SERPL-CCNC: 12 U/L — SIGNIFICANT CHANGE UP (ref 10–40)
AST SERPL-CCNC: 8 U/L — LOW (ref 10–40)
BASOPHILS # BLD AUTO: 0.04 K/UL — SIGNIFICANT CHANGE UP (ref 0–0.2)
BASOPHILS NFR BLD AUTO: 0.5 % — SIGNIFICANT CHANGE UP (ref 0–2)
BILIRUB SERPL-MCNC: 0.2 MG/DL — SIGNIFICANT CHANGE UP (ref 0.2–1.2)
BILIRUB SERPL-MCNC: 0.4 MG/DL — SIGNIFICANT CHANGE UP (ref 0.2–1.2)
BUN SERPL-MCNC: 6 MG/DL — LOW (ref 7–23)
BUN SERPL-MCNC: 8 MG/DL — SIGNIFICANT CHANGE UP (ref 7–23)
CALCIUM SERPL-MCNC: 8.3 MG/DL — LOW (ref 8.4–10.5)
CALCIUM SERPL-MCNC: 8.6 MG/DL — SIGNIFICANT CHANGE UP (ref 8.4–10.5)
CGA FLD-MCNC: 24.1 NG/ML — SIGNIFICANT CHANGE UP (ref 0–101.8)
CHLORIDE SERPL-SCNC: 104 MMOL/L — SIGNIFICANT CHANGE UP (ref 96–108)
CHLORIDE SERPL-SCNC: 104 MMOL/L — SIGNIFICANT CHANGE UP (ref 96–108)
CO2 SERPL-SCNC: 24 MMOL/L — SIGNIFICANT CHANGE UP (ref 22–31)
CO2 SERPL-SCNC: 26 MMOL/L — SIGNIFICANT CHANGE UP (ref 22–31)
CREAT SERPL-MCNC: 0.73 MG/DL — SIGNIFICANT CHANGE UP (ref 0.5–1.3)
CREAT SERPL-MCNC: 0.78 MG/DL — SIGNIFICANT CHANGE UP (ref 0.5–1.3)
EGFR: 113 ML/MIN/1.73M2 — SIGNIFICANT CHANGE UP
EGFR: 116 ML/MIN/1.73M2 — SIGNIFICANT CHANGE UP
EOSINOPHIL # BLD AUTO: 0.3 K/UL — SIGNIFICANT CHANGE UP (ref 0–0.5)
EOSINOPHIL NFR BLD AUTO: 4.1 % — SIGNIFICANT CHANGE UP (ref 0–6)
GAS PNL BLDV: SIGNIFICANT CHANGE UP
GLUCOSE BLDC GLUCOMTR-MCNC: 105 MG/DL — HIGH (ref 70–99)
GLUCOSE BLDC GLUCOMTR-MCNC: 107 MG/DL — HIGH (ref 70–99)
GLUCOSE BLDC GLUCOMTR-MCNC: 114 MG/DL — HIGH (ref 70–99)
GLUCOSE BLDC GLUCOMTR-MCNC: 117 MG/DL — HIGH (ref 70–99)
GLUCOSE BLDC GLUCOMTR-MCNC: 121 MG/DL — HIGH (ref 70–99)
GLUCOSE BLDC GLUCOMTR-MCNC: 132 MG/DL — HIGH (ref 70–99)
GLUCOSE BLDC GLUCOMTR-MCNC: 132 MG/DL — HIGH (ref 70–99)
GLUCOSE BLDC GLUCOMTR-MCNC: 141 MG/DL — HIGH (ref 70–99)
GLUCOSE BLDC GLUCOMTR-MCNC: 69 MG/DL — LOW (ref 70–99)
GLUCOSE BLDC GLUCOMTR-MCNC: 69 MG/DL — LOW (ref 70–99)
GLUCOSE BLDC GLUCOMTR-MCNC: 76 MG/DL — SIGNIFICANT CHANGE UP (ref 70–99)
GLUCOSE BLDC GLUCOMTR-MCNC: 80 MG/DL — SIGNIFICANT CHANGE UP (ref 70–99)
GLUCOSE BLDC GLUCOMTR-MCNC: 82 MG/DL — SIGNIFICANT CHANGE UP (ref 70–99)
GLUCOSE BLDC GLUCOMTR-MCNC: 84 MG/DL — SIGNIFICANT CHANGE UP (ref 70–99)
GLUCOSE BLDC GLUCOMTR-MCNC: 87 MG/DL — SIGNIFICANT CHANGE UP (ref 70–99)
GLUCOSE BLDC GLUCOMTR-MCNC: 88 MG/DL — SIGNIFICANT CHANGE UP (ref 70–99)
GLUCOSE BLDC GLUCOMTR-MCNC: 90 MG/DL — SIGNIFICANT CHANGE UP (ref 70–99)
GLUCOSE BLDC GLUCOMTR-MCNC: 94 MG/DL — SIGNIFICANT CHANGE UP (ref 70–99)
GLUCOSE BLDC GLUCOMTR-MCNC: 96 MG/DL — SIGNIFICANT CHANGE UP (ref 70–99)
GLUCOSE BLDC GLUCOMTR-MCNC: 98 MG/DL — SIGNIFICANT CHANGE UP (ref 70–99)
GLUCOSE SERPL-MCNC: 153 MG/DL — HIGH (ref 70–99)
GLUCOSE SERPL-MCNC: 72 MG/DL — SIGNIFICANT CHANGE UP (ref 70–99)
HCT VFR BLD CALC: 39.1 % — SIGNIFICANT CHANGE UP (ref 39–50)
HGB BLD-MCNC: 11.7 G/DL — LOW (ref 13–17)
IMM GRANULOCYTES NFR BLD AUTO: 0.4 % — SIGNIFICANT CHANGE UP (ref 0–0.9)
LYMPHOCYTES # BLD AUTO: 1.84 K/UL — SIGNIFICANT CHANGE UP (ref 1–3.3)
LYMPHOCYTES # BLD AUTO: 25.2 % — SIGNIFICANT CHANGE UP (ref 13–44)
MAGNESIUM SERPL-MCNC: 2 MG/DL — SIGNIFICANT CHANGE UP (ref 1.6–2.6)
MAGNESIUM SERPL-MCNC: 2 MG/DL — SIGNIFICANT CHANGE UP (ref 1.6–2.6)
MCHC RBC-ENTMCNC: 20.4 PG — LOW (ref 27–34)
MCHC RBC-ENTMCNC: 29.9 G/DL — LOW (ref 32–36)
MCV RBC AUTO: 68.1 FL — LOW (ref 80–100)
MONOCYTES # BLD AUTO: 0.57 K/UL — SIGNIFICANT CHANGE UP (ref 0–0.9)
MONOCYTES NFR BLD AUTO: 7.8 % — SIGNIFICANT CHANGE UP (ref 2–14)
NEUTROPHILS # BLD AUTO: 4.52 K/UL — SIGNIFICANT CHANGE UP (ref 1.8–7.4)
NEUTROPHILS NFR BLD AUTO: 62 % — SIGNIFICANT CHANGE UP (ref 43–77)
NRBC # BLD: 0 /100 WBCS — SIGNIFICANT CHANGE UP (ref 0–0)
PHOSPHATE SERPL-MCNC: 1.9 MG/DL — LOW (ref 2.5–4.5)
PHOSPHATE SERPL-MCNC: 3.4 MG/DL — SIGNIFICANT CHANGE UP (ref 2.5–4.5)
PLATELET # BLD AUTO: 327 K/UL — SIGNIFICANT CHANGE UP (ref 150–400)
POTASSIUM SERPL-MCNC: 3.4 MMOL/L — LOW (ref 3.5–5.3)
POTASSIUM SERPL-MCNC: 3.4 MMOL/L — LOW (ref 3.5–5.3)
POTASSIUM SERPL-SCNC: 3.4 MMOL/L — LOW (ref 3.5–5.3)
POTASSIUM SERPL-SCNC: 3.4 MMOL/L — LOW (ref 3.5–5.3)
PROT SERPL-MCNC: 6.8 G/DL — SIGNIFICANT CHANGE UP (ref 6–8.3)
PROT SERPL-MCNC: 7.3 G/DL — SIGNIFICANT CHANGE UP (ref 6–8.3)
RBC # BLD: 5.74 M/UL — SIGNIFICANT CHANGE UP (ref 4.2–5.8)
RBC # FLD: 16.8 % — HIGH (ref 10.3–14.5)
SODIUM SERPL-SCNC: 140 MMOL/L — SIGNIFICANT CHANGE UP (ref 135–145)
SODIUM SERPL-SCNC: 142 MMOL/L — SIGNIFICANT CHANGE UP (ref 135–145)
WBC # BLD: 7.3 K/UL — SIGNIFICANT CHANGE UP (ref 3.8–10.5)
WBC # FLD AUTO: 7.3 K/UL — SIGNIFICANT CHANGE UP (ref 3.8–10.5)

## 2025-01-06 PROCEDURE — 99254 IP/OBS CNSLTJ NEW/EST MOD 60: CPT | Mod: 25,GC

## 2025-01-06 PROCEDURE — 99232 SBSQ HOSP IP/OBS MODERATE 35: CPT

## 2025-01-06 PROCEDURE — 99291 CRITICAL CARE FIRST HOUR: CPT | Mod: 25,GC

## 2025-01-06 PROCEDURE — 93971 EXTREMITY STUDY: CPT | Mod: 26,LT

## 2025-01-06 PROCEDURE — 71045 X-RAY EXAM CHEST 1 VIEW: CPT | Mod: 26

## 2025-01-06 PROCEDURE — 36556 INSERT NON-TUNNEL CV CATH: CPT | Mod: GC,59

## 2025-01-06 PROCEDURE — 76536 US EXAM OF HEAD AND NECK: CPT | Mod: 26

## 2025-01-06 PROCEDURE — 99233 SBSQ HOSP IP/OBS HIGH 50: CPT

## 2025-01-06 RX ORDER — POTASSIUM CHLORIDE 600 MG/1
40 TABLET, FILM COATED, EXTENDED RELEASE ORAL ONCE
Refills: 0 | Status: COMPLETED | OUTPATIENT
Start: 2025-01-06 | End: 2025-01-06

## 2025-01-06 RX ORDER — DEXTROSE MONOHYDRATE 100 MG/ML
500 INJECTION, SOLUTION INTRAVENOUS
Refills: 0 | Status: DISCONTINUED | OUTPATIENT
Start: 2025-01-06 | End: 2025-01-10

## 2025-01-06 RX ORDER — SOD PHOS DI, MONO/K PHOS MONO 250 MG
1 TABLET ORAL EVERY 4 HOURS
Refills: 0 | Status: COMPLETED | OUTPATIENT
Start: 2025-01-06 | End: 2025-01-07

## 2025-01-06 RX ORDER — OCTREOTIDE ACETATE 20 MG
200 KIT INTRAMUSCULAR EVERY 8 HOURS
Refills: 0 | Status: DISCONTINUED | OUTPATIENT
Start: 2025-01-06 | End: 2025-01-06

## 2025-01-06 RX ORDER — ACETAMINOPHEN 80 MG/.8ML
650 SOLUTION/ DROPS ORAL EVERY 6 HOURS
Refills: 0 | Status: DISCONTINUED | OUTPATIENT
Start: 2025-01-06 | End: 2025-01-10

## 2025-01-06 RX ORDER — OCTREOTIDE ACETATE 20 MG
300 KIT INTRAMUSCULAR EVERY 8 HOURS
Refills: 0 | Status: DISCONTINUED | OUTPATIENT
Start: 2025-01-06 | End: 2025-01-10

## 2025-01-06 RX ORDER — DEXMEDETOMIDINE HYDROCHLORIDE 4 UG/ML
1.5 INJECTION, SOLUTION INTRAVENOUS
Qty: 200 | Refills: 0 | Status: DISCONTINUED | OUTPATIENT
Start: 2025-01-06 | End: 2025-01-07

## 2025-01-06 RX ORDER — DEXTROSE MONOHYDRATE 25 G/50ML
25 INJECTION, SOLUTION INTRAVENOUS ONCE
Refills: 0 | Status: DISCONTINUED | OUTPATIENT
Start: 2025-01-06 | End: 2025-01-06

## 2025-01-06 RX ORDER — DEXTROSE MONOHYDRATE 25 G/50ML
50 INJECTION, SOLUTION INTRAVENOUS ONCE
Refills: 0 | Status: COMPLETED | OUTPATIENT
Start: 2025-01-06 | End: 2025-01-06

## 2025-01-06 RX ORDER — SODIUM CHLORIDE 9 MG/ML
10 INJECTION, SOLUTION INTRAMUSCULAR; INTRAVENOUS; SUBCUTANEOUS
Refills: 0 | Status: DISCONTINUED | OUTPATIENT
Start: 2025-01-06 | End: 2025-01-10

## 2025-01-06 RX ORDER — CHLORHEXIDINE GLUCONATE 1.2 MG/ML
1 RINSE ORAL
Refills: 0 | Status: DISCONTINUED | OUTPATIENT
Start: 2025-01-06 | End: 2025-01-09

## 2025-01-06 RX ADMIN — Medication 150 MILLIGRAM(S): at 02:10

## 2025-01-06 RX ADMIN — CHLORHEXIDINE GLUCONATE 1 APPLICATION(S): 1.2 RINSE ORAL at 05:40

## 2025-01-06 RX ADMIN — DEXMEDETOMIDINE HYDROCHLORIDE 50.2 MICROGRAM(S)/KG/HR: 4 INJECTION, SOLUTION INTRAVENOUS at 17:00

## 2025-01-06 RX ADMIN — OCTREOTIDE ACETATE 300 MICROGRAM(S): KIT INTRAMUSCULAR at 21:11

## 2025-01-06 RX ADMIN — DEXTROSE MONOHYDRATE 25 MILLILITER(S): 100 INJECTION, SOLUTION INTRAVENOUS at 20:00

## 2025-01-06 RX ADMIN — DEXTROSE MONOHYDRATE 50 MILLILITER(S): 25 INJECTION, SOLUTION INTRAVENOUS at 16:00

## 2025-01-06 RX ADMIN — Medication 1 PACKET(S): at 17:11

## 2025-01-06 RX ADMIN — Medication 1 PACKET(S): at 21:11

## 2025-01-06 RX ADMIN — ENOXAPARIN SODIUM 40 MILLIGRAM(S): 60 INJECTION INTRAVENOUS; SUBCUTANEOUS at 13:37

## 2025-01-06 RX ADMIN — Medication 200 MILLIGRAM(S): at 21:11

## 2025-01-06 RX ADMIN — POTASSIUM CHLORIDE 40 MILLIEQUIVALENT(S): 600 TABLET, FILM COATED, EXTENDED RELEASE ORAL at 02:10

## 2025-01-06 RX ADMIN — Medication 150 MILLIGRAM(S): at 17:46

## 2025-01-06 RX ADMIN — OCTREOTIDE ACETATE 200 MICROGRAM(S): KIT INTRAMUSCULAR at 13:37

## 2025-01-06 RX ADMIN — DEXTROSE MONOHYDRATE 50 MILLILITER(S): 100 INJECTION, SOLUTION INTRAVENOUS at 08:14

## 2025-01-06 RX ADMIN — Medication 150 MILLIGRAM(S): at 09:56

## 2025-01-06 RX ADMIN — SODIUM CHLORIDE 125 MILLILITER(S): 9 INJECTION, SOLUTION INTRAVENOUS at 08:14

## 2025-01-06 RX ADMIN — DEXTROSE MONOHYDRATE 50 MILLILITER(S): 25 INJECTION, SOLUTION INTRAVENOUS at 12:48

## 2025-01-06 RX ADMIN — SODIUM CHLORIDE 125 MILLILITER(S): 9 INJECTION, SOLUTION INTRAVENOUS at 21:11

## 2025-01-06 RX ADMIN — OCTREOTIDE ACETATE 100 MICROGRAM(S): KIT INTRAMUSCULAR at 05:40

## 2025-01-06 RX ADMIN — DEXTROSE MONOHYDRATE 50 MILLILITER(S): 25 INJECTION, SOLUTION INTRAVENOUS at 06:23

## 2025-01-06 RX ADMIN — Medication 7.5 MILLIGRAM(S): at 05:40

## 2025-01-06 NOTE — PROGRESS NOTE ADULT - SUBJECTIVE AND OBJECTIVE BOX
MICU Progress Note    SUBJECTIVE  INTERVAL EVENTS:  - NAEON    OBJECTIVE  Physical Exam:   PHYSICAL EXAM:  GENERAL: No acute distress, well-developed  HEAD:  Atraumatic, Normocephalic  EYES: EOMI, PERRLA, conjunctiva and sclera clear  NECK: Supple, no lymphadenopathy, no JVD  CHEST/LUNG: CTAB; No wheezes, rales, or rhonchi  HEART: Regular rate and rhythm. Normal S1/S2. No murmurs, rubs, or gallops  ABDOMEN: Soft, non-tender, non-distended; normal bowel sounds, no organomegaly  EXTREMITIES:  2+ peripheral pulses b/l, No clubbing, cyanosis, or edema  NEUROLOGY: A&O x 3, no focal deficits  SKIN: No rashes or lesions    ICU Vital Signs Last 24 Hrs  T(F): 98.3 (06 Jan 2025 04:00), Max: 98.5 (05 Jan 2025 16:00)  HR: 84 (06 Jan 2025 07:00) (82 - 100)  BP: 164/76 (06 Jan 2025 07:00) (120/65 - 175/84)  BP(mean): 109 (06 Jan 2025 07:00) (85 - 121)  ABP: --  ABP(mean): --  RR: 26 (06 Jan 2025 07:00) (15 - 26)  SpO2: 94% (06 Jan 2025 07:00) (92% - 100%)    I&O's Summary    05 Jan 2025 07:01  -  06 Jan 2025 07:00  --------------------------------------------------------  IN: 5789 mL / OUT: 4550 mL / NET: 1239 mL        ECMO Day#     Adult Advanced Hemodynamics Last 24 Hrs  CVP(mm Hg): --  CVP(cm H2O): --  CO: --  CI: --  PA: --  PA(mean): --  PCWP: --  SVR: --  SVRI: --  PVR: --  PVRI: --    ECMO SETTINGS:  Type:		[ ] Venovenous		[ ] Venoarterial  Cannulation Site(s):     Flow:  	        RPM: 		    Oxygenator:	Sweep:     L/min	FiO2:        LABS:                        11.7   7.30  )-----------( 327      ( 06 Jan 2025 00:20 )             39.1     01-06    140  |  104  |  8   ----------------------------<  153[H]  3.4[L]   |  24  |  0.78    Ca    8.3[L]      06 Jan 2025 00:21  Phos  3.4     01-06  Mg     2.0     01-06    TPro  6.8  /  Alb  3.6  /  TBili  0.2  /  DBili  x   /  AST  12  /  ALT  20  /  AlkPhos  62  01-06          Venous: 01-05-25 @ 11:47 FiO2: -- Oxygen Sat% 91.5    Urinalysis Basic - ( 06 Jan 2025 00:21 )    Color: x / Appearance: x / SG: x / pH: x  Gluc: 153 mg/dL / Ketone: x  / Bili: x / Urobili: x   Blood: x / Protein: x / Nitrite: x   Leuk Esterase: x / RBC: x / WBC x   Sq Epi: x / Non Sq Epi: x / Bacteria: x          CAPILLARY BLOOD GLUCOSE      POCT Blood Glucose.: 121 mg/dL (06 Jan 2025 07:07)  POCT Blood Glucose.: 141 mg/dL (06 Jan 2025 06:43)  POCT Blood Glucose.: 82 mg/dL (06 Jan 2025 06:09)  POCT Blood Glucose.: 80 mg/dL (06 Jan 2025 05:20)  POCT Blood Glucose.: 90 mg/dL (06 Jan 2025 04:10)  POCT Blood Glucose.: 114 mg/dL (06 Jan 2025 03:13)  POCT Blood Glucose.: 107 mg/dL (06 Jan 2025 02:09)  POCT Blood Glucose.: 132 mg/dL (06 Jan 2025 01:05)  POCT Blood Glucose.: 132 mg/dL (06 Jan 2025 00:02)  POCT Blood Glucose.: 147 mg/dL (05 Jan 2025 23:04)  POCT Blood Glucose.: 138 mg/dL (05 Jan 2025 22:08)  POCT Blood Glucose.: 140 mg/dL (05 Jan 2025 21:37)  POCT Blood Glucose.: 128 mg/dL (05 Jan 2025 20:52)  POCT Blood Glucose.: 63 mg/dL (05 Jan 2025 20:25)  POCT Blood Glucose.: 65 mg/dL (05 Jan 2025 20:02)  POCT Blood Glucose.: 80 mg/dL (05 Jan 2025 19:02)  POCT Blood Glucose.: 107 mg/dL (05 Jan 2025 18:01)  POCT Blood Glucose.: 103 mg/dL (05 Jan 2025 17:07)  POCT Blood Glucose.: 101 mg/dL (05 Jan 2025 16:14)  POCT Blood Glucose.: 121 mg/dL (05 Jan 2025 14:57)  POCT Blood Glucose.: 155 mg/dL (05 Jan 2025 14:00)  POCT Blood Glucose.: 154 mg/dL (05 Jan 2025 13:09)  POCT Blood Glucose.: 125 mg/dL (05 Jan 2025 12:11)  POCT Blood Glucose.: 111 mg/dL (05 Jan 2025 11:01)  POCT Blood Glucose.: 113 mg/dL (05 Jan 2025 10:05)  POCT Blood Glucose.: 120 mg/dL (05 Jan 2025 09:00)  POCT Blood Glucose.: 133 mg/dL (05 Jan 2025 08:02)        RADIOLOGY & ADDITIONAL TESTS:  Other Labs  Imaging Personally Reviewed: No interval imaging  Electrocardiogram Personally Reviewed: No interval imaging    Medications and Allergies:   MEDICATIONS  (STANDING):  amLODIPine   Tablet 7.5 milliGRAM(s) Oral daily  chlorhexidine 2% Cloths 1 Application(s) Topical <User Schedule>  dextrose 10% + sodium chloride 0.9%. 1000 milliLiter(s) (125 mL/Hr) IV Continuous <Continuous>  dextrose 10%. 1000 milliLiter(s) (50 mL/Hr) IV Continuous <Continuous>  diazoxide Suspension 150 milliGRAM(s) Oral every 8 hours  enoxaparin Injectable 40 milliGRAM(s) SubCutaneous every 24 hours  octreotide  Injectable 100 MICROGram(s) IV Push every 8 hours  polyethylene glycol 3350 17 Gram(s) Oral daily    MEDICATIONS  (PRN):  sodium chloride 0.65% Nasal 1 Spray(s) Both Nostrils three times a day PRN Nasal Congestion      Antimicrobials            Allergies    No Known Allergies    Intolerances     MICU Progress Note    SUBJECTIVE  INTERVAL EVENTS:  - Patient with LUE arm swelling       OBJECTIVE  Physical Exam:   PHYSICAL EXAM:  GENERAL: No acute distress, well-developed  HEAD:  Atraumatic, Normocephalic  EYES: EOMI, PERRLA, conjunctiva and sclera clear  NECK: Supple, no lymphadenopathy, no JVD  CHEST/LUNG: CTAB; No wheezes, rales, or rhonchi  HEART: Regular rate and rhythm. Normal S1/S2. No murmurs, rubs, or gallops  ABDOMEN: Soft, non-tender, non-distended; normal bowel sounds, no organomegaly  EXTREMITIES: B/l upper extremity edema, L>R, pain w/ palpation left forearm   NEUROLOGY: A&O x 3, no focal deficits  SKIN: No rashes or lesions    ICU Vital Signs Last 24 Hrs  T(F): 98.3 (06 Jan 2025 04:00), Max: 98.5 (05 Jan 2025 16:00)  HR: 84 (06 Jan 2025 07:00) (82 - 100)  BP: 164/76 (06 Jan 2025 07:00) (120/65 - 175/84)  BP(mean): 109 (06 Jan 2025 07:00) (85 - 121)  ABP: --  ABP(mean): --  RR: 26 (06 Jan 2025 07:00) (15 - 26)  SpO2: 94% (06 Jan 2025 07:00) (92% - 100%)    I&O's Summary    05 Jan 2025 07:01  -  06 Jan 2025 07:00  --------------------------------------------------------  IN: 5789 mL / OUT: 4550 mL / NET: 1239 mL        ECMO Day#     Adult Advanced Hemodynamics Last 24 Hrs  CVP(mm Hg): --  CVP(cm H2O): --  CO: --  CI: --  PA: --  PA(mean): --  PCWP: --  SVR: --  SVRI: --  PVR: --  PVRI: --    ECMO SETTINGS:  Type:		[ ] Venovenous		[ ] Venoarterial  Cannulation Site(s):     Flow:  	        RPM: 		    Oxygenator:	Sweep:     L/min	FiO2:        LABS:                        11.7   7.30  )-----------( 327      ( 06 Jan 2025 00:20 )             39.1     01-06    140  |  104  |  8   ----------------------------<  153[H]  3.4[L]   |  24  |  0.78    Ca    8.3[L]      06 Jan 2025 00:21  Phos  3.4     01-06  Mg     2.0     01-06    TPro  6.8  /  Alb  3.6  /  TBili  0.2  /  DBili  x   /  AST  12  /  ALT  20  /  AlkPhos  62  01-06          Venous: 01-05-25 @ 11:47 FiO2: -- Oxygen Sat% 91.5    Urinalysis Basic - ( 06 Jan 2025 00:21 )    Color: x / Appearance: x / SG: x / pH: x  Gluc: 153 mg/dL / Ketone: x  / Bili: x / Urobili: x   Blood: x / Protein: x / Nitrite: x   Leuk Esterase: x / RBC: x / WBC x   Sq Epi: x / Non Sq Epi: x / Bacteria: x          CAPILLARY BLOOD GLUCOSE      POCT Blood Glucose.: 121 mg/dL (06 Jan 2025 07:07)  POCT Blood Glucose.: 141 mg/dL (06 Jan 2025 06:43)  POCT Blood Glucose.: 82 mg/dL (06 Jan 2025 06:09)  POCT Blood Glucose.: 80 mg/dL (06 Jan 2025 05:20)  POCT Blood Glucose.: 90 mg/dL (06 Jan 2025 04:10)  POCT Blood Glucose.: 114 mg/dL (06 Jan 2025 03:13)  POCT Blood Glucose.: 107 mg/dL (06 Jan 2025 02:09)  POCT Blood Glucose.: 132 mg/dL (06 Jan 2025 01:05)  POCT Blood Glucose.: 132 mg/dL (06 Jan 2025 00:02)  POCT Blood Glucose.: 147 mg/dL (05 Jan 2025 23:04)  POCT Blood Glucose.: 138 mg/dL (05 Jan 2025 22:08)  POCT Blood Glucose.: 140 mg/dL (05 Jan 2025 21:37)  POCT Blood Glucose.: 128 mg/dL (05 Jan 2025 20:52)  POCT Blood Glucose.: 63 mg/dL (05 Jan 2025 20:25)  POCT Blood Glucose.: 65 mg/dL (05 Jan 2025 20:02)  POCT Blood Glucose.: 80 mg/dL (05 Jan 2025 19:02)  POCT Blood Glucose.: 107 mg/dL (05 Jan 2025 18:01)  POCT Blood Glucose.: 103 mg/dL (05 Jan 2025 17:07)  POCT Blood Glucose.: 101 mg/dL (05 Jan 2025 16:14)  POCT Blood Glucose.: 121 mg/dL (05 Jan 2025 14:57)  POCT Blood Glucose.: 155 mg/dL (05 Jan 2025 14:00)  POCT Blood Glucose.: 154 mg/dL (05 Jan 2025 13:09)  POCT Blood Glucose.: 125 mg/dL (05 Jan 2025 12:11)  POCT Blood Glucose.: 111 mg/dL (05 Jan 2025 11:01)  POCT Blood Glucose.: 113 mg/dL (05 Jan 2025 10:05)  POCT Blood Glucose.: 120 mg/dL (05 Jan 2025 09:00)  POCT Blood Glucose.: 133 mg/dL (05 Jan 2025 08:02)        RADIOLOGY & ADDITIONAL TESTS:  Other Labs  Imaging Personally Reviewed: No interval imaging  Electrocardiogram Personally Reviewed: No interval imaging    Medications and Allergies:   MEDICATIONS  (STANDING):  amLODIPine   Tablet 7.5 milliGRAM(s) Oral daily  chlorhexidine 2% Cloths 1 Application(s) Topical <User Schedule>  dextrose 10% + sodium chloride 0.9%. 1000 milliLiter(s) (125 mL/Hr) IV Continuous <Continuous>  dextrose 10%. 1000 milliLiter(s) (50 mL/Hr) IV Continuous <Continuous>  diazoxide Suspension 150 milliGRAM(s) Oral every 8 hours  enoxaparin Injectable 40 milliGRAM(s) SubCutaneous every 24 hours  octreotide  Injectable 100 MICROGram(s) IV Push every 8 hours  polyethylene glycol 3350 17 Gram(s) Oral daily    MEDICATIONS  (PRN):  sodium chloride 0.65% Nasal 1 Spray(s) Both Nostrils three times a day PRN Nasal Congestion      Antimicrobials            Allergies    No Known Allergies    Intolerances

## 2025-01-06 NOTE — PROGRESS NOTE ADULT - SUBJECTIVE AND OBJECTIVE BOX
Pending discussion with attending physician.  INCOMPLETE NOTE  ENDOCRINE FOLLOW UP     Chief Complaint: nausea  Endocrine consulted for hypoglycemia   History: Patient seen and examined at bedside. No acute complaints.   Glucose still below goal despite D10, diazoxide and octreotide.     MEDICATIONS  (STANDING):  amLODIPine   Tablet 7.5 milliGRAM(s) Oral daily  chlorhexidine 2% Cloths 1 Application(s) Topical <User Schedule>  dexMEDEtomidine Infusion 1.5 MICROgram(s)/kG/Hr (50.2 mL/Hr) IV Continuous <Continuous>  dextrose 10% + sodium chloride 0.9%. 1000 milliLiter(s) (125 mL/Hr) IV Continuous <Continuous>  dextrose 10%. 1000 milliLiter(s) (50 mL/Hr) IV Continuous <Continuous>  diazoxide Suspension 150 milliGRAM(s) Oral every 8 hours  enoxaparin Injectable 40 milliGRAM(s) SubCutaneous every 24 hours  octreotide  Injectable 300 MICROGram(s) IV Push every 8 hours  polyethylene glycol 3350 17 Gram(s) Oral daily  potassium phosphate / sodium phosphate Powder (PHOS-NaK) 1 Packet(s) Oral every 4 hours    MEDICATIONS  (PRN):  sodium chloride 0.65% Nasal 1 Spray(s) Both Nostrils three times a day PRN Nasal Congestion      Allergies    No Known Allergies    Intolerances        ROS: All other systems reviewed and negative    PHYSICAL EXAM:  VITALS: T(C): 37.2 (01-06-25 @ 12:00)  T(F): 99 (01-06-25 @ 12:00), Max: 99 (01-06-25 @ 12:00)  HR: 85 (01-06-25 @ 16:00) (84 - 105)  BP: 120/58 (01-06-25 @ 16:00) (120/58 - 176/91)  RR:  (14 - 39)  SpO2:  (92% - 100%)  Wt(kg): --  GENERAL: NAD, resting comfortably   EYES: No proptosis,  anicteric  HEENT:  Atraumatic, Normocephalic, moist mucous membranes  RESPIRATORY: Nonlabored respirations on room air, normal rate/effort   CARDIOVASCULAR: Regular rate and rhythm; no heave  GI: Soft, nontender, non distended  NEURO: Alert and oriented, moves all extremities spontaneously  PSYCH:  reactive affect, euthymic mood    POCT Blood Glucose.: 105 mg/dL (01-06-25 @ 16:42)  POCT Blood Glucose.: 87 mg/dL (01-06-25 @ 14:53)  POCT Blood Glucose.: 69 mg/dL (01-06-25 @ 13:55)  POCT Blood Glucose.: 117 mg/dL (01-06-25 @ 12:50)  POCT Blood Glucose.: 69 mg/dL (01-06-25 @ 12:18)  POCT Blood Glucose.: 96 mg/dL (01-06-25 @ 10:00)  POCT Blood Glucose.: 94 mg/dL (01-06-25 @ 09:08)  POCT Blood Glucose.: 84 mg/dL (01-06-25 @ 08:12)  POCT Blood Glucose.: 121 mg/dL (01-06-25 @ 07:07)  POCT Blood Glucose.: 141 mg/dL (01-06-25 @ 06:43)  POCT Blood Glucose.: 82 mg/dL (01-06-25 @ 06:09)  POCT Blood Glucose.: 80 mg/dL (01-06-25 @ 05:20)  POCT Blood Glucose.: 90 mg/dL (01-06-25 @ 04:10)  POCT Blood Glucose.: 114 mg/dL (01-06-25 @ 03:13)  POCT Blood Glucose.: 107 mg/dL (01-06-25 @ 02:09)  POCT Blood Glucose.: 132 mg/dL (01-06-25 @ 01:05)  POCT Blood Glucose.: 132 mg/dL (01-06-25 @ 00:02)  POCT Blood Glucose.: 147 mg/dL (01-05-25 @ 23:04)  POCT Blood Glucose.: 138 mg/dL (01-05-25 @ 22:08)  POCT Blood Glucose.: 140 mg/dL (01-05-25 @ 21:37)  POCT Blood Glucose.: 128 mg/dL (01-05-25 @ 20:52)  POCT Blood Glucose.: 63 mg/dL (01-05-25 @ 20:25)  POCT Blood Glucose.: 65 mg/dL (01-05-25 @ 20:02)  POCT Blood Glucose.: 80 mg/dL (01-05-25 @ 19:02)  POCT Blood Glucose.: 107 mg/dL (01-05-25 @ 18:01)  POCT Blood Glucose.: 103 mg/dL (01-05-25 @ 17:07)  POCT Blood Glucose.: 101 mg/dL (01-05-25 @ 16:14)  POCT Blood Glucose.: 121 mg/dL (01-05-25 @ 14:57)  POCT Blood Glucose.: 155 mg/dL (01-05-25 @ 14:00)  POCT Blood Glucose.: 154 mg/dL (01-05-25 @ 13:09)  POCT Blood Glucose.: 125 mg/dL (01-05-25 @ 12:11)  POCT Blood Glucose.: 111 mg/dL (01-05-25 @ 11:01)  POCT Blood Glucose.: 113 mg/dL (01-05-25 @ 10:05)  POCT Blood Glucose.: 120 mg/dL (01-05-25 @ 09:00)  POCT Blood Glucose.: 133 mg/dL (01-05-25 @ 08:02)  POCT Blood Glucose.: 132 mg/dL (01-05-25 @ 06:54)  POCT Blood Glucose.: 145 mg/dL (01-05-25 @ 05:58)  POCT Blood Glucose.: 127 mg/dL (01-05-25 @ 05:10)  POCT Blood Glucose.: 131 mg/dL (01-05-25 @ 04:02)  POCT Blood Glucose.: 138 mg/dL (01-05-25 @ 02:53)  POCT Blood Glucose.: 150 mg/dL (01-05-25 @ 01:50)  POCT Blood Glucose.: 144 mg/dL (01-05-25 @ 00:52)  POCT Blood Glucose.: 134 mg/dL (01-04-25 @ 23:57)  POCT Blood Glucose.: 142 mg/dL (01-04-25 @ 22:51)  POCT Blood Glucose.: 137 mg/dL (01-04-25 @ 21:56)  POCT Blood Glucose.: 130 mg/dL (01-04-25 @ 20:56)  POCT Blood Glucose.: 123 mg/dL (01-04-25 @ 19:51)  POCT Blood Glucose.: 103 mg/dL (01-04-25 @ 18:52)  POCT Blood Glucose.: 121 mg/dL (01-04-25 @ 18:11)  POCT Blood Glucose.: 98 mg/dL (01-04-25 @ 17:12)  POCT Blood Glucose.: 116 mg/dL (01-04-25 @ 16:38)  POCT Blood Glucose.: 52 mg/dL (01-04-25 @ 16:19)  POCT Blood Glucose.: 51 mg/dL (01-04-25 @ 16:17)  POCT Blood Glucose.: 121 mg/dL (01-04-25 @ 15:39)  POCT Blood Glucose.: 56 mg/dL (01-04-25 @ 15:07)  POCT Blood Glucose.: 80 mg/dL (01-04-25 @ 13:06)  POCT Blood Glucose.: 61 mg/dL (01-04-25 @ 12:01)  POCT Blood Glucose.: 79 mg/dL (01-04-25 @ 11:02)  POCT Blood Glucose.: 91 mg/dL (01-04-25 @ 10:05)  POCT Blood Glucose.: 89 mg/dL (01-04-25 @ 08:39)  POCT Blood Glucose.: 91 mg/dL (01-04-25 @ 07:54)  POCT Blood Glucose.: 448 mg/dL (01-04-25 @ 07:52)  POCT Blood Glucose.: 94 mg/dL (01-04-25 @ 06:40)  POCT Blood Glucose.: 113 mg/dL (01-04-25 @ 05:37)  POCT Blood Glucose.: 100 mg/dL (01-04-25 @ 04:37)  POCT Blood Glucose.: 102 mg/dL (01-04-25 @ 03:26)  POCT Blood Glucose.: 98 mg/dL (01-04-25 @ 02:34)  POCT Blood Glucose.: 86 mg/dL (01-04-25 @ 01:30)  POCT Blood Glucose.: 80 mg/dL (01-04-25 @ 00:29)  POCT Blood Glucose.: 74 mg/dL (01-03-25 @ 23:29)  POCT Blood Glucose.: 77 mg/dL (01-03-25 @ 22:28)  POCT Blood Glucose.: 77 mg/dL (01-03-25 @ 21:29)  POCT Blood Glucose.: 76 mg/dL (01-03-25 @ 20:33)  POCT Blood Glucose.: 113 mg/dL (01-03-25 @ 19:37)  POCT Blood Glucose.: 100 mg/dL (01-03-25 @ 18:26)      01-06    142  |  104  |  6[L]  ----------------------------<  72  3.4[L]   |  26  |  0.73    eGFR: 116    Ca    8.6      01-06  Mg     2.0     01-06  Phos  1.9     01-06    TPro  7.3  /  Alb  3.9  /  TBili  0.4  /  DBili  x   /  AST  8[L]  /  ALT  18  /  AlkPhos  68  01-06      A1C with Estimated Average Glucose Result: 4.9 % (01-03-25 @ 06:20)      Thyroid Stimulating Hormone, Serum: 3.50 uIU/mL (01-03-25 @ 04:55)  Thyroid Stimulating Hormone, Serum: 1.60 uIU/mL (01-02-25 @ 15:54)

## 2025-01-06 NOTE — PROGRESS NOTE ADULT - ASSESSMENT
Patient is a 44 year old male with past medical history of hypertension who presented to the hospital with hypoglycemia. 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   - CT abd/pel with PANCREAS: Lobulated enhancing mass at the pancreatic tail measuring 4.3 x 3.7 cm, with abutment of the left kidney at the posterior margin. Additional hypoattenuating lesion in the pancreatic body measuring 2.1 x 1.8 cm.  ADRENALS: Nonspecific mildly thickened bilateral adrenal glands.  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 DDx also includes other causes of insulin-mediated hypoglycemia (i.e insulin antibodies), medication  - AM cortisol 12.6- 1/4/25, ruling out adrenal insufficiency  Recommendations:  - Increase diazoxide to 200 mg q8h  - continue octreotide, increased by primary team to 300 q8h  - continue D10  - continue to check FSG q4h for now  - hypoglycemia protocol  - follow up sulfonylurea panel, insulin antibodies.   - follow up neuroendocrine tumor markers as baseline: chromogranin A, gastrin, VIP level  - Appreciate surg onc consult, agree with GI consult for EUS. Recommend first line treatment of surgical resection for treatment of insulinoma  - Outpatient genetic testing  - for periprocedural management of hypoglycemia: per recommendations above.     #HTN  Management per primary team, currently on amlodipine 7.5 mg qd    #Thyroid nodule  - Incidental finding on CT chest  - Outpatient follow up for ultrasound thyroid and further risk stratification     Thank you for the consult. Will continue to monitor. Please inform the endocrinology team at least 24 hours prior to intended discharge date.     Discussed with attending physician.  Kiran Hyde,    PGY-4 Endocrine Fellow  Can be reached via [employer] Teams.    For follow up questions, discharge recommendations or new consults, please email [email or web address] or [email or web address] ([email or web address]) or call the answering service at [phone] (weekdays); [phone] (nights/weekends).   For emergencies, please page fellow on call.

## 2025-01-06 NOTE — PROGRESS NOTE ADULT - SUBJECTIVE AND OBJECTIVE BOX
Surgery Daily Progress Note    24 Hour/ Interval Events:  - No acute overnight events.    Subjective and Interval:  Seen and examined at bedside. Afebrile.   ___________________________________________________  Vital Signs  T(C): 36.8 (00:00), Max: 36.9 (16:00)  HR: 92 (02:00) (81 - 100)  BP: 155/65 (02:00) (120/65 - 177/89)  ABP: --  ABP(mean): --  RR: 16 (02:00) (14 - 23)  SpO2: 94% (02:00) (92% - 100%)    Physical Exam  General: WN/WD NAD  Neurology: A&Ox3, nonfocal, follows commands  Respiratory: CTA B/L  CV: Normal rate regular rhythm  Abdominal: Soft, NT, ND  Extremities: No edema    In&Out    01-04-25 @ 07:01  -  01-05-25 @ 07:00  --------------------------------------------------------  IN: 5912 mL / OUT: 5400 mL / NET: 512 mL    01-05-25 @ 07:01  - 01-06-25 @ 02:22  --------------------------------------------------------  IN: 4814 mL / OUT: 3150 mL / NET: 1664 mL        Labs    cret                        11.7   7.30  )-----------( 327      ( 06 Jan 2025 00:20 )             39.1     01-06    140  |  104  |  8   ----------------------------<  153[H]  3.4[L]   |  24  |  0.78    Ca    8.3[L]      06 Jan 2025 00:21  Phos  3.4     01-06  Mg     2.0     01-06    TPro  6.8  /  Alb  3.6  /  TBili  0.2  /  DBili  x   /  AST  12  /  ALT  20  /  AlkPhos  62  01-06            Medications  amLODIPine   Tablet 7.5 milliGRAM(s) Oral daily  chlorhexidine 2% Cloths 1 Application(s) Topical <User Schedule>  dextrose 10% + sodium chloride 0.9%. 1000 milliLiter(s) IV Continuous <Continuous>  dextrose 10%. 1000 milliLiter(s) IV Continuous <Continuous>  diazoxide Suspension 150 milliGRAM(s) Oral every 8 hours  enoxaparin Injectable 40 milliGRAM(s) SubCutaneous every 24 hours  octreotide  Injectable 100 MICROGram(s) IV Push every 8 hours  polyethylene glycol 3350 17 Gram(s) Oral daily  sodium chloride 0.65% Nasal 1 Spray(s) Both Nostrils three times a day PRN    MRI 1/3/2025:  Impression:   1. Unchanged 4.3 cm arterially hyperenhancing pancreatic tail mass, likely a neuroendocrine tumor.  2. There is also a 2.1 x 2.4 cm septated cystic lesion within the pancreatic body without appreciable solid component, which is nonspecific and could represent a mucinous cystadenoma, side branch intraductal papillary mucinous neoplasm (IPMN), or pseudocyst. There is a history of prior pancreatitis. Consider endoscopic ultrasound if clinically appropriate.  3. No evidence of metastatic disease within the abdomen.    ___________________________________________________  Assessment & Plan    44 M with pmhx of HTN who initially presented after experiencing generalized weakness and confusion who was admitted to MICU for significant and persistent hypoglycemia. CT showing 4x3cm lesion in tail of pancreas and 2x2cm lesion in body. Patient with persistent hypoglycemia despite D10 administration. Patient also with elevated c-peptide of 5.6. 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    Plan:   - High suspicion for insulinoma in tail of panc, will need operative intervention after workup complete  - F/U GI recommendation regarding EUA to assess pancreatic body lesion   - Surgical oncology to follow     [facility] Surgery, [id number]

## 2025-01-06 NOTE — PROGRESS NOTE ADULT - ASSESSMENT
44 M with pmhx of HTN who initially presented after experiencing generalized weakness and confusion who was admitted to MICU for significant and persistent hypoglycemia    Neuro:  AOX 3, mental status at baseline. Initially lethargic 2/2 to hypoglycemia   CTH showed no acute intracranial hemorrhage, mass effect, or midline shift.  - CTM    Cardiovascular  # Hypertension  - at home on amlodipine 7.5mg daily   - can continue for now     Respiratory  # non-labored breathing, satting 95% on room air   - no active issues     GI/Nutrition  # Diarrhea   - had 3-4 days of watery diarrhea in [place]  - other close contacts had similar symptoms, has resolved  - likely viral gastroenteritis, less likely VIPoma given self resolving nature of diarrhea and close contacts with similar symptoms   - can consider GI PCR if diarrhea recurs     #Nutrition  - DASH, TLC    /Renal  - no active issues     ID  - patient afebrile, no leukocytosis   - f/u urine cultures     Endocrine  #Hypoglycemia  #Insulinoma   CT abdomen and pelvis showing lesions in pancreatic tail along with persistent hypoglycemia c/f Insulinoma  Surg onc consulted   - c/w diazoxide per endo   - continue with q1 FS for now   - continue with 2 IVs running D10 at 100cc/hr, if needed will consider D20 drip but patient refuses central line  - started octreotide 100mg qd + every 6 hours prn  - f/u hypoglycemic labs   - endocrine following, recs appreciated      Hematology   #DVT ppx   - Lovenox     Oncology  #pancreatic tail mass  #insulinoma   CT Abdomen and pelvis showing Lobulated 4.3 cm enhancing pancreatic tail mass, suspicious for a neuroendocrine tumor given the patient's clinical history. Additional 2.1 cm indeterminate hypoattenuating lesion in the pancreatic body.   MR abdomen and MRCP ordered for further Consider contrast enhanced MRI of the abdomen/MRCP for further evaluation.  - Surg onc consulted   - advanced GI consulted for U/S   - CT chest w/ IV con w/o mets   -->> MR read expedited 1/5/25  - f/u gastrin, vasoactive intestinal peptide, chromogranin A, and c-peptide levels    44 M with pmhx of HTN who initially presented after experiencing generalized weakness and confusion who was admitted to MICU for significant and persistent hypoglycemia    Neuro:  AOX 3, mental status at baseline. Initially lethargic 2/2 to hypoglycemia   CTH showed no acute intracranial hemorrhage, mass effect, or midline shift.  - CTM    Cardiovascular  # Hypertension  - at home on amlodipine 7.5mg daily   - can continue for now     Respiratory  # non-labored breathing, satting 95% on room air   - no active issues     GI/Nutrition  # Diarrhea   RESOLVED   - had 3-4 days of watery diarrhea in [place]  - other close contacts had similar symptoms, has resolved  - likely viral gastroenteritis, less likely VIPoma given self resolving nature of diarrhea and close contacts with similar symptoms   - can consider GI PCR if diarrhea recurs     #Nutrition  - DASH, TLC    /Renal  - no active issues     ID  - patient afebrile, no leukocytosis   - f/u urine cultures     Endocrine  #Hypoglycemia  #Insulinoma   CT abdomen and pelvis showing lesions in pancreatic tail along with persistent hypoglycemia c/f Insulinoma  Surg onc consulted   - c/w diazoxide per endo   - continue with q2 FS for now   - continue with 2 IVs running D10 at 100cc/hr, if needed will consider D20 drip but patient refuses central line  - Increase octreotide to 200mg qd + every 6 hours prn  - f/u hypoglycemic labs   - endocrine following, recs appreciated      Hematology   #LUE swelling  - LUE duplex to r/o DVT     #DVT ppx   - Lovenox     Oncology  #pancreatic tail mass  #insulinoma   CT Abdomen and pelvis showing Lobulated 4.3 cm enhancing pancreatic tail mass, suspicious for a neuroendocrine tumor given the patient's clinical history. Additional 2.1 cm indeterminate hypoattenuating lesion in the pancreatic body.   MR abdomen and MRCP ordered for further Consider contrast enhanced MRI of the abdomen/MRCP for further evaluation.  CT chest w/ IV con w/o mets   MR read w/ possible IPMN in pancreatic body and possible neuroendocrine tumor in pancreatic tail   - Surg onc consulted   - advanced GI consulted for U/S   - f/u gastrin, vasoactive intestinal peptide, chromogranin A, and c-peptide levels

## 2025-01-07 ENCOUNTER — TRANSCRIPTION ENCOUNTER (OUTPATIENT)
Age: 45
End: 2025-01-07

## 2025-01-07 ENCOUNTER — RESULT REVIEW (OUTPATIENT)
Age: 45
End: 2025-01-07

## 2025-01-07 LAB
ALBUMIN SERPL ELPH-MCNC: 3.4 G/DL — SIGNIFICANT CHANGE UP (ref 3.3–5)
ALBUMIN SERPL ELPH-MCNC: 3.5 G/DL — SIGNIFICANT CHANGE UP (ref 3.3–5)
ALP SERPL-CCNC: 64 U/L — SIGNIFICANT CHANGE UP (ref 40–120)
ALP SERPL-CCNC: 64 U/L — SIGNIFICANT CHANGE UP (ref 40–120)
ALT FLD-CCNC: 15 U/L — SIGNIFICANT CHANGE UP (ref 10–45)
ALT FLD-CCNC: 17 U/L — SIGNIFICANT CHANGE UP (ref 10–45)
AMYLASE FLD-CCNC: 1280 U/L — SIGNIFICANT CHANGE UP
ANION GAP SERPL CALC-SCNC: 11 MMOL/L — SIGNIFICANT CHANGE UP (ref 5–17)
ANION GAP SERPL CALC-SCNC: 11 MMOL/L — SIGNIFICANT CHANGE UP (ref 5–17)
AST SERPL-CCNC: 11 U/L — SIGNIFICANT CHANGE UP (ref 10–40)
AST SERPL-CCNC: 12 U/L — SIGNIFICANT CHANGE UP (ref 10–40)
BASOPHILS # BLD AUTO: 0.04 K/UL — SIGNIFICANT CHANGE UP (ref 0–0.2)
BASOPHILS NFR BLD AUTO: 0.6 % — SIGNIFICANT CHANGE UP (ref 0–2)
BILIRUB SERPL-MCNC: 0.3 MG/DL — SIGNIFICANT CHANGE UP (ref 0.2–1.2)
BILIRUB SERPL-MCNC: 0.3 MG/DL — SIGNIFICANT CHANGE UP (ref 0.2–1.2)
BUN SERPL-MCNC: 4 MG/DL — LOW (ref 7–23)
BUN SERPL-MCNC: 7 MG/DL — SIGNIFICANT CHANGE UP (ref 7–23)
CALCIUM SERPL-MCNC: 8.5 MG/DL — SIGNIFICANT CHANGE UP (ref 8.4–10.5)
CALCIUM SERPL-MCNC: 8.5 MG/DL — SIGNIFICANT CHANGE UP (ref 8.4–10.5)
CHLORIDE SERPL-SCNC: 104 MMOL/L — SIGNIFICANT CHANGE UP (ref 96–108)
CHLORIDE SERPL-SCNC: 105 MMOL/L — SIGNIFICANT CHANGE UP (ref 96–108)
CO2 SERPL-SCNC: 24 MMOL/L — SIGNIFICANT CHANGE UP (ref 22–31)
CO2 SERPL-SCNC: 24 MMOL/L — SIGNIFICANT CHANGE UP (ref 22–31)
CREAT SERPL-MCNC: 0.65 MG/DL — SIGNIFICANT CHANGE UP (ref 0.5–1.3)
CREAT SERPL-MCNC: 0.78 MG/DL — SIGNIFICANT CHANGE UP (ref 0.5–1.3)
EGFR: 113 ML/MIN/1.73M2 — SIGNIFICANT CHANGE UP
EGFR: 120 ML/MIN/1.73M2 — SIGNIFICANT CHANGE UP
EOSINOPHIL # BLD AUTO: 0.43 K/UL — SIGNIFICANT CHANGE UP (ref 0–0.5)
EOSINOPHIL NFR BLD AUTO: 6.4 % — HIGH (ref 0–6)
GLUCOSE BLDC GLUCOMTR-MCNC: 103 MG/DL — HIGH (ref 70–99)
GLUCOSE BLDC GLUCOMTR-MCNC: 105 MG/DL — HIGH (ref 70–99)
GLUCOSE BLDC GLUCOMTR-MCNC: 108 MG/DL — HIGH (ref 70–99)
GLUCOSE BLDC GLUCOMTR-MCNC: 78 MG/DL — SIGNIFICANT CHANGE UP (ref 70–99)
GLUCOSE BLDC GLUCOMTR-MCNC: 83 MG/DL — SIGNIFICANT CHANGE UP (ref 70–99)
GLUCOSE BLDC GLUCOMTR-MCNC: 85 MG/DL — SIGNIFICANT CHANGE UP (ref 70–99)
GLUCOSE BLDC GLUCOMTR-MCNC: 85 MG/DL — SIGNIFICANT CHANGE UP (ref 70–99)
GLUCOSE BLDC GLUCOMTR-MCNC: 86 MG/DL — SIGNIFICANT CHANGE UP (ref 70–99)
GLUCOSE BLDC GLUCOMTR-MCNC: 87 MG/DL — SIGNIFICANT CHANGE UP (ref 70–99)
GLUCOSE BLDC GLUCOMTR-MCNC: 89 MG/DL — SIGNIFICANT CHANGE UP (ref 70–99)
GLUCOSE SERPL-MCNC: 185 MG/DL — HIGH (ref 70–99)
GLUCOSE SERPL-MCNC: 95 MG/DL — SIGNIFICANT CHANGE UP (ref 70–99)
HCT VFR BLD CALC: 38.7 % — LOW (ref 39–50)
HGB BLD-MCNC: 11.3 G/DL — LOW (ref 13–17)
IMM GRANULOCYTES NFR BLD AUTO: 0.1 % — SIGNIFICANT CHANGE UP (ref 0–0.9)
LYMPHOCYTES # BLD AUTO: 1.75 K/UL — SIGNIFICANT CHANGE UP (ref 1–3.3)
LYMPHOCYTES # BLD AUTO: 26.2 % — SIGNIFICANT CHANGE UP (ref 13–44)
MAGNESIUM SERPL-MCNC: 1.9 MG/DL — SIGNIFICANT CHANGE UP (ref 1.6–2.6)
MAGNESIUM SERPL-MCNC: 1.9 MG/DL — SIGNIFICANT CHANGE UP (ref 1.6–2.6)
MCHC RBC-ENTMCNC: 20.1 PG — LOW (ref 27–34)
MCHC RBC-ENTMCNC: 29.2 G/DL — LOW (ref 32–36)
MCV RBC AUTO: 68.7 FL — LOW (ref 80–100)
MONOCYTES # BLD AUTO: 0.55 K/UL — SIGNIFICANT CHANGE UP (ref 0–0.9)
MONOCYTES NFR BLD AUTO: 8.2 % — SIGNIFICANT CHANGE UP (ref 2–14)
NEUTROPHILS # BLD AUTO: 3.9 K/UL — SIGNIFICANT CHANGE UP (ref 1.8–7.4)
NEUTROPHILS NFR BLD AUTO: 58.5 % — SIGNIFICANT CHANGE UP (ref 43–77)
NRBC # BLD: 0 /100 WBCS — SIGNIFICANT CHANGE UP (ref 0–0)
PHOSPHATE SERPL-MCNC: 2.7 MG/DL — SIGNIFICANT CHANGE UP (ref 2.5–4.5)
PHOSPHATE SERPL-MCNC: 3.4 MG/DL — SIGNIFICANT CHANGE UP (ref 2.5–4.5)
PLATELET # BLD AUTO: 310 K/UL — SIGNIFICANT CHANGE UP (ref 150–400)
POTASSIUM SERPL-MCNC: 3.3 MMOL/L — LOW (ref 3.5–5.3)
POTASSIUM SERPL-MCNC: 3.5 MMOL/L — SIGNIFICANT CHANGE UP (ref 3.5–5.3)
POTASSIUM SERPL-SCNC: 3.3 MMOL/L — LOW (ref 3.5–5.3)
POTASSIUM SERPL-SCNC: 3.5 MMOL/L — SIGNIFICANT CHANGE UP (ref 3.5–5.3)
PROT SERPL-MCNC: 6.7 G/DL — SIGNIFICANT CHANGE UP (ref 6–8.3)
PROT SERPL-MCNC: 6.8 G/DL — SIGNIFICANT CHANGE UP (ref 6–8.3)
RBC # BLD: 5.63 M/UL — SIGNIFICANT CHANGE UP (ref 4.2–5.8)
RBC # FLD: 17.3 % — HIGH (ref 10.3–14.5)
SODIUM SERPL-SCNC: 139 MMOL/L — SIGNIFICANT CHANGE UP (ref 135–145)
SODIUM SERPL-SCNC: 140 MMOL/L — SIGNIFICANT CHANGE UP (ref 135–145)
WBC # BLD: 6.68 K/UL — SIGNIFICANT CHANGE UP (ref 3.8–10.5)
WBC # FLD AUTO: 6.68 K/UL — SIGNIFICANT CHANGE UP (ref 3.8–10.5)

## 2025-01-07 PROCEDURE — 99291 CRITICAL CARE FIRST HOUR: CPT | Mod: GC

## 2025-01-07 PROCEDURE — 43242 EGD US FINE NEEDLE BX/ASPIR: CPT

## 2025-01-07 PROCEDURE — 99233 SBSQ HOSP IP/OBS HIGH 50: CPT

## 2025-01-07 PROCEDURE — 88173 CYTOPATH EVAL FNA REPORT: CPT | Mod: 26

## 2025-01-07 PROCEDURE — 88305 TISSUE EXAM BY PATHOLOGIST: CPT | Mod: 26

## 2025-01-07 PROCEDURE — 99232 SBSQ HOSP IP/OBS MODERATE 35: CPT

## 2025-01-07 RX ORDER — POTASSIUM CHLORIDE 600 MG/1
40 TABLET, FILM COATED, EXTENDED RELEASE ORAL ONCE
Refills: 0 | Status: COMPLETED | OUTPATIENT
Start: 2025-01-07 | End: 2025-01-07

## 2025-01-07 RX ORDER — SODIUM CHLORIDE 9 MG/ML
500 INJECTION, SOLUTION INTRAMUSCULAR; INTRAVENOUS; SUBCUTANEOUS
Refills: 0 | Status: DISCONTINUED | OUTPATIENT
Start: 2025-01-07 | End: 2025-01-07

## 2025-01-07 RX ADMIN — Medication 200 MILLIGRAM(S): at 13:41

## 2025-01-07 RX ADMIN — OCTREOTIDE ACETATE 300 MICROGRAM(S): KIT INTRAMUSCULAR at 13:40

## 2025-01-07 RX ADMIN — DEXTROSE MONOHYDRATE 50 MILLILITER(S): 100 INJECTION, SOLUTION INTRAVENOUS at 10:43

## 2025-01-07 RX ADMIN — DEXTROSE MONOHYDRATE 50 MILLILITER(S): 100 INJECTION, SOLUTION INTRAVENOUS at 13:00

## 2025-01-07 RX ADMIN — OCTREOTIDE ACETATE 300 MICROGRAM(S): KIT INTRAMUSCULAR at 05:37

## 2025-01-07 RX ADMIN — DEXTROSE MONOHYDRATE 50 MILLILITER(S): 100 INJECTION, SOLUTION INTRAVENOUS at 21:37

## 2025-01-07 RX ADMIN — DEXTROSE MONOHYDRATE 50 MILLILITER(S): 100 INJECTION, SOLUTION INTRAVENOUS at 23:00

## 2025-01-07 RX ADMIN — Medication 300 MILLIGRAM(S): at 21:37

## 2025-01-07 RX ADMIN — Medication 200 MILLIGRAM(S): at 05:37

## 2025-01-07 RX ADMIN — CHLORHEXIDINE GLUCONATE 1 APPLICATION(S): 1.2 RINSE ORAL at 05:45

## 2025-01-07 RX ADMIN — DEXTROSE MONOHYDRATE 35 MILLILITER(S): 100 INJECTION, SOLUTION INTRAVENOUS at 07:00

## 2025-01-07 RX ADMIN — DEXTROSE MONOHYDRATE 50 MILLILITER(S): 100 INJECTION, SOLUTION INTRAVENOUS at 22:00

## 2025-01-07 RX ADMIN — Medication 1 PACKET(S): at 02:05

## 2025-01-07 RX ADMIN — DEXTROSE MONOHYDRATE 30 MILLILITER(S): 100 INJECTION, SOLUTION INTRAVENOUS at 00:00

## 2025-01-07 RX ADMIN — DEXTROSE MONOHYDRATE 50 MILLILITER(S): 100 INJECTION, SOLUTION INTRAVENOUS at 15:00

## 2025-01-07 RX ADMIN — OCTREOTIDE ACETATE 300 MICROGRAM(S): KIT INTRAMUSCULAR at 21:37

## 2025-01-07 RX ADMIN — POTASSIUM CHLORIDE 40 MILLIEQUIVALENT(S): 600 TABLET, FILM COATED, EXTENDED RELEASE ORAL at 05:36

## 2025-01-07 RX ADMIN — DEXTROSE MONOHYDRATE 35 MILLILITER(S): 100 INJECTION, SOLUTION INTRAVENOUS at 08:00

## 2025-01-07 RX ADMIN — DEXTROSE MONOHYDRATE 50 MILLILITER(S): 100 INJECTION, SOLUTION INTRAVENOUS at 20:00

## 2025-01-07 RX ADMIN — DEXTROSE MONOHYDRATE 50 MILLILITER(S): 100 INJECTION, SOLUTION INTRAVENOUS at 12:00

## 2025-01-07 RX ADMIN — SODIUM CHLORIDE 125 MILLILITER(S): 9 INJECTION, SOLUTION INTRAVENOUS at 21:37

## 2025-01-07 RX ADMIN — DEXTROSE MONOHYDRATE 35 MILLILITER(S): 100 INJECTION, SOLUTION INTRAVENOUS at 09:00

## 2025-01-07 RX ADMIN — ENOXAPARIN SODIUM 40 MILLIGRAM(S): 60 INJECTION INTRAVENOUS; SUBCUTANEOUS at 13:46

## 2025-01-07 RX ADMIN — Medication 7.5 MILLIGRAM(S): at 05:45

## 2025-01-07 RX ADMIN — DEXTROSE MONOHYDRATE 50 MILLILITER(S): 100 INJECTION, SOLUTION INTRAVENOUS at 14:00

## 2025-01-07 RX ADMIN — DEXTROSE MONOHYDRATE 50 MILLILITER(S): 100 INJECTION, SOLUTION INTRAVENOUS at 19:00

## 2025-01-07 NOTE — PROGRESS NOTE ADULT - SUBJECTIVE AND OBJECTIVE BOX
Surgery Daily Progress Note    24 Hour/ Interval Events:  - L IJ Central Venous line was placed 1/6.       Subjective and Interval:  Seen and examined at bedside. Afebrile.   ___________________________________________________  Vital Signs  T(C): 36.9 (00:00), Max: 37.2 (12:00)  HR: 82 (02:00) (80 - 105)  BP: 133/73 (02:00) (111/56 - 176/91)  ABP: --  ABP(mean): --  RR: 14 (02:00) (13 - 39)  SpO2: 100% (02:00) (92% - 100%)    Physical Exam  General: WN/WD NAD  Neurology: A&Ox3, nonfocal, follows commands  Respiratory: CTA B/L  CV: Normal rate regular rhythm  Abdominal: Soft, NT, ND  Extremities: No edema    In&Out    01-05-25 @ 07:01  -  01-06-25 @ 07:00  --------------------------------------------------------  IN: 5789 mL / OUT: 4550 mL / NET: 1239 mL    01-06-25 @ 07:01  -  01-07-25 @ 02:59  --------------------------------------------------------  IN: 4028.4 mL / OUT: 1430 mL / NET: 2598.4 mL        Labs    LABS:  cret                        11.3   6.68  )-----------( 310      ( 07 Jan 2025 00:35 )             38.7     01-07    140  |  105  |  7   ----------------------------<  95  3.3[L]   |  24  |  0.78    Ca    8.5      07 Jan 2025 00:35  Phos  3.4     01-07  Mg     1.9     01-07    TPro  6.7  /  Alb  3.5  /  TBili  0.3  /  DBili  x   /  AST  12  /  ALT  15  /  AlkPhos  64  01-07          Medications  acetaminophen     Tablet .. 650 milliGRAM(s) Oral every 6 hours PRN  amLODIPine   Tablet 7.5 milliGRAM(s) Oral daily  chlorhexidine 2% Cloths 1 Application(s) Topical <User Schedule>  chlorhexidine 4% Liquid 1 Application(s) Topical <User Schedule>  dexMEDEtomidine Infusion 1.5 MICROgram(s)/kG/Hr IV Continuous <Continuous>  dextrose 10% + sodium chloride 0.9%. 1000 milliLiter(s) IV Continuous <Continuous>  dextrose 20%. 500 milliLiter(s) IV Continuous <Continuous>  diazoxide Suspension 200 milliGRAM(s) Oral every 8 hours  enoxaparin Injectable 40 milliGRAM(s) SubCutaneous every 24 hours  octreotide  Injectable 300 MICROGram(s) IV Push every 8 hours  polyethylene glycol 3350 17 Gram(s) Oral daily  potassium chloride    Tablet ER 40 milliEquivalent(s) Oral once  sodium chloride 0.65% Nasal 1 Spray(s) Both Nostrils three times a day PRN  sodium chloride 0.9% lock flush 10 milliLiter(s) IV Push every 1 hour PRN      ___________________________________________________  Assessment & Plan    44 M with pmhx of HTN who initially presented after experiencing generalized weakness and confusion who was admitted to MICU for significant and persistent hypoglycemia. CT showing 4x3cm lesion in tail of pancreas and 2x2cm lesion in body. Patient with persistent hypoglycemia despite D10 administration. Patient also with elevated c-peptide of 5.6. 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    Plan:   - High suspicion for insulinoma in tail of panc, will need operative intervention after workup complete  - F/U GI recommendation regarding EUA to assess pancreatic body lesion   - Surgical oncology to follow       [facility] Surgery, [id number]

## 2025-01-07 NOTE — DISCHARGE NOTE PROVIDER - NSDCCPCAREPLAN_GEN_ALL_CORE_FT
PRINCIPAL DISCHARGE DIAGNOSIS  Diagnosis: Hypoglycemia  Assessment and Plan of Treatment: You came to the hospital due to confusion that was likely due to low blood sugars. You were found to have a pancreatic mass that was likely a tumor called an "insulinoma", which secretes insulin and causes low blood sugar. We started you on fluids with sugar, medications, and consulted our specialty endocrinology and surgery teams for treatment recommendations. Please follow up with your primary care provider and surgery team after discharge. Please seek medical care if you experience dizziness, confusion, lightheadedness, pain at the procedure site, bleeding, or other concerning symptoms.      SECONDARY DISCHARGE DIAGNOSES  Diagnosis: Pancreatic lesion  Assessment and Plan of Treatment: You were found to have a pancreatic mass and cyst in your imaging. While one is likely a tumor called an insulinoma, the cyst could potentially be non-cancerous lesion. We consulted our advanced GI team so that they could evaluate the lesion with a endoscopy. Please follow up with the gastroenterology team after discharge. Please seek medical care if you experience abdominal/back pain, chest pain, decrease appetite, nausea, or other concerning symptoms.     PRINCIPAL DISCHARGE DIAGNOSIS  Diagnosis: Hypoglycemia  Assessment and Plan of Treatment: You came to the hospital due to confusion that was likely due to low blood sugars. You were found to have a pancreatic mass that was likely a tumor called an "insulinoma", which secretes insulin and causes low blood sugar. We started you on fluids with sugar, medications, and consulted our specialty endocrinology and surgery teams for treatment recommendations. Please follow up with your primary care provider and surgery team after discharge. Please seek medical care if you experience dizziness, confusion, lightheadedness, pain at the procedure site, bleeding, or other concerning symptoms.      SECONDARY DISCHARGE DIAGNOSES  Diagnosis: Pancreatic lesion  Assessment and Plan of Treatment:

## 2025-01-07 NOTE — DISCHARGE NOTE PROVIDER - PROVIDER TOKENS
PROVIDER:[TOKEN:[[id number]],FOLLOWUP:[2 weeks]] PROVIDER:[TOKEN:[[id number]],FOLLOWUP:[1 week]]

## 2025-01-07 NOTE — DISCHARGE NOTE PROVIDER - HOSPITAL COURSE
HPI:  45 yo M with pmhx of HTN comes to the ED after experiencing confusion and weakness. Patient's symptoms started on 12/30 while he was away in [place]. He was experiencing 4 episodes of diarrhea at the time. However, he attributed his symptoms to gastroenteritis as his family members were experiencing similar symptoms. He returned to [place] and was found to be experiencing confusion as he was spraying the ceiling with water. He went to urgent care and was then recommended to come to the hospital. He has never experienced symptoms like this prior. His ROS otherwise is notable for a history of night sweats and he is unsure if he has experienced unintentional weight loss. Today, he is feeling better as his confusion and weakness has improved. His last episode of diarrhea was also the day prior to coming to the hospital.     In the ED, his vitals were wnl and his labs were significant for persistent hypoglycemia and hypoglycemia. A CT A/P was also obtained that showed pancreatic lesions s/p D50 and initiation of D5LR. He is now admitted for further management.     Hospital Course: Patient admitted to the MICU in the setting of persistent hypoglycemia requiring Q1 fingersticks. Due to hypoglycemia, patient started on d10 in 1 PIV, d10/NS in 2nd PIV. Despite fluids and adequate diet, patient frequently hypoglycemic requiring intermittent amps of d50. Endocrinology consulted for workup and treatment recommendations and surgery oncology recommended for possible resection. MR Hypoglycemia and insulinoma workup sent showing elevated c-peptide and elevated insulin levels. Recommended starting patient on diazoxide which was increased during hospital course. here is also a 2.1 x 2.4 cm septated cystic lesion within the   pancreatic body without appreciable solid component, which is nonspecific   and could represent a mucinous cystadenoma, side branch intraductal   papillary mucinous neoplasm (IPMN), or pseudocyst. There is a history of   prior pancreatitis. Consider endoscopic ultrasound if clinically   appropriate.     HPI:  43 yo M with pmhx of HTN comes to the ED after experiencing confusion and weakness. Patient's symptoms started on 12/30 while he was away in [place]. He was experiencing 4 episodes of diarrhea at the time. However, he attributed his symptoms to gastroenteritis as his family members were experiencing similar symptoms. He returned to [place] and was found to be experiencing confusion as he was spraying the ceiling with water. He went to urgent care and was then recommended to come to the hospital. He has never experienced symptoms like this prior. His ROS otherwise is notable for a history of night sweats and he is unsure if he has experienced unintentional weight loss. Today, he is feeling better as his confusion and weakness has improved. His last episode of diarrhea was also the day prior to coming to the hospital.     In the ED, his vitals were wnl and his labs were significant for persistent hypoglycemia and hypoglycemia. A CT A/P was also obtained that showed pancreatic lesions s/p D50 and initiation of D5LR. He is now admitted for further management.     Hospital Course: Patient admitted to the MICU in the setting of persistent hypoglycemia requiring Q1 fingersticks. Due to hypoglycemia, patient started on d10 in 1 PIV, d10/NS in 2nd PIV. Despite fluids and adequate diet, patient frequently hypoglycemic requiring intermittent amps of d50. MRCP showing pancreatic tail mass likely a neuroendocrine tumor, with an additional cystic lesion in pancreatic body that could be IPMN. Endocrinology consulted for workup/treatment recommendations, surgery oncology consulted for possible resection, and advanced GI consulted for EUS for IPMN. Hypoglycemia and insulinoma workup sent showing elevated c-peptide and elevated insulin levels. During hospital course, patient started on diazoxide and octreotide which was increased in the setting of persistent hypoglycemia. Due to inadequate glucose delivery, patient had central line placed on 1/6 in order to receive d20. EUS done on 1/7 which showed ___. Patient transferred to [facility] on ___ for pancreatic mass resection.    To do:  1. Titrate d20 (d/c d10/ns) based on blood glucose   2. Wean octreotide and diazoxide per endo       HPI:  43 yo M with pmhx of HTN comes to the ED after experiencing confusion and weakness. Patient's symptoms started on 12/30 while he was away in [place]. He was experiencing 4 episodes of diarrhea at the time. However, he attributed his symptoms to gastroenteritis as his family members were experiencing similar symptoms. He returned to [place] and was found to be experiencing confusion as he was spraying the ceiling with water. He went to urgent care and was then recommended to come to the hospital. He has never experienced symptoms like this prior. His ROS otherwise is notable for a history of night sweats and he is unsure if he has experienced unintentional weight loss. Today, he is feeling better as his confusion and weakness has improved. His last episode of diarrhea was also the day prior to coming to the hospital.     In the ED, his vitals were wnl and his labs were significant for persistent hypoglycemia and hypoglycemia. A CT A/P was also obtained that showed pancreatic lesions s/p D50 and initiation of D5LR. He is now admitted for further management.     Hospital Course: Patient admitted to the MICU in the setting of persistent hypoglycemia requiring Q1 fingersticks. Due to hypoglycemia, patient started on d10 in 1 PIV, d10/NS in 2nd PIV. Despite fluids and adequate diet, patient frequently hypoglycemic requiring intermittent amps of d50. MRCP showing pancreatic tail mass likely a neuroendocrine tumor, with an additional cystic lesion in pancreatic body that could be IPMN. Endocrinology consulted for workup/treatment recommendations, surgery oncology consulted for possible resection, and advanced GI consulted for EUS for IPMN. Hypoglycemia and insulinoma workup sent showing elevated c-peptide and elevated insulin levels. During hospital course, patient started on diazoxide and octreotide which was increased in the setting of persistent hypoglycemia. Due to inadequate glucose delivery, patient had central line placed on 1/6 in order to receive d20. EUS done on 1/7 which showed ___. Patient transferred to [facility] on ___ for pancreatic mass resection.    To do:  1. Titrate d20 (d/c d10/ns) based on blood glucose   2. Wean octreotide and diazoxide per endo  3. ensure anesthesia is aware of patients witnessed ANCELMO to reduce lucy sedation complications       HPI:  43 yo M with pmhx of HTN comes to the ED after experiencing confusion and weakness. Patient's symptoms started on 12/30 while he was away in [place]. He was experiencing 4 episodes of diarrhea at the time. However, he attributed his symptoms to gastroenteritis as his family members were experiencing similar symptoms. He returned to [place] and was found to be experiencing confusion as he was spraying the ceiling with water. He went to urgent care and was then recommended to come to the hospital. He has never experienced symptoms like this prior. His ROS otherwise is notable for a history of night sweats and he is unsure if he has experienced unintentional weight loss. Today, he is feeling better as his confusion and weakness has improved. His last episode of diarrhea was also the day prior to coming to the hospital.     In the ED, his vitals were wnl and his labs were significant for persistent hypoglycemia and hypoglycemia. A CT A/P was also obtained that showed pancreatic lesions s/p D50 and initiation of D5LR. He is now admitted for further management.     Hospital Course: Patient admitted to the MICU in the setting of persistent hypoglycemia requiring Q1 fingersticks. Due to hypoglycemia, patient started on d10 in 1 PIV, d10/NS in 2nd PIV. Despite fluids and adequate diet, patient frequently hypoglycemic requiring intermittent amps of d50. MRCP showing pancreatic tail mass likely a neuroendocrine tumor, with an additional cystic lesion in pancreatic body that could be IPMN. Endocrinology consulted for workup/treatment recommendations, surgery oncology consulted for possible resection, and advanced GI consulted for EUS for IPMN. Hypoglycemia and insulinoma workup sent showing elevated c-peptide and elevated insulin levels. During hospital course, patient started on diazoxide and octreotide which was increased in the setting of persistent hypoglycemia. Due to inadequate glucose delivery, patient had central line placed on 1/6 in order to receive d20. EUS done on 1/7 which showed ___. Patient transferred to [facility] on ___ for pancreatic mass resection.    To do:  1. Titrate d20 (d/c d10/ns) based on blood glucose   2. Wean octreotide and diazoxide per endo  3. ensure anesthesia is aware of patients witnessed ANCELMO to reduce lucy sedation complications  4. Eventual Biopsy of suspicious thyroid nodule.        HPI:  43 yo M with pmhx of HTN comes to the ED after experiencing confusion (as he was spraying the ceiling with water) and weakness on 1/2. Patient's symptoms started on 12/30 while he was away in [place] and had 4 episodes of diarrhea at the time with family members experiencing similar symptoms, pt's diarrhea has since resolved prior to arrival to hospital. His ROS otherwise is notable for a history of night sweats and he is unsure if he has experienced unintentional weight loss. Today, he is feeling better as his confusion and weakness has improved. His last episode of diarrhea was also the day prior to coming to the hospital.     In the ED, his vitals were wnl and his labs were significant for persistent hypoglycemia and hypoglycemia. A CT A/P was also obtained that showed pancreatic lesions s/p D50 and initiation of D5LR. He is now admitted for further management.     Hospital Course: Patient admitted to the MICU in the setting of persistent hypoglycemia requiring Q1 fingersticks. Due to hypoglycemia, patient started on d10 in 1 PIV, d10/NS in 2nd PIV. Despite fluids and adequate diet, patient frequently hypoglycemic requiring intermittent amps of d50. MRCP showing pancreatic tail mass likely a neuroendocrine tumor, with an additional cystic lesion in pancreatic body that could be IPMN. Endocrinology consulted for workup/treatment recommendations, surgery oncology consulted for possible resection, and advanced GI consulted for EUS for IPMN. Hypoglycemia and insulinoma workup sent showing elevated c-peptide and elevated insulin levels. During hospital course, patient started on diazoxide and octreotide which was increased in the setting of persistent hypoglycemia. Due to inadequate glucose delivery, patient had central line placed on 1/6 in order to receive d20. EUS done on 1/7 which showed ___. Patient transferred to [facility] on ___ for pancreatic mass resection.    To do:  1. Titrate d20 (d/c d10/ns) based on blood glucose   2. Wean octreotide and diazoxide per endo  3. ensure anesthesia is aware of patients witnessed ANCELMO to reduce lucy sedation complications  4. Eventual Biopsy of suspicious thyroid nodule.        normal... HPI:  45 yo M with pmhx of HTN comes to the ED after experiencing confusion (as he was spraying the ceiling with water) and weakness on 1/2. Patient's symptoms started on 12/30 while he was away in [place] and had 4 episodes of diarrhea at the time with family members experiencing similar symptoms, pt's diarrhea has since resolved prior to arrival to hospital. His ROS otherwise is notable for a history of night sweats and he is unsure if he has experienced unintentional weight loss. In the ED, vitals were wnl and his labs were significant for persistent hypoglycemia and hypoglycemia. A CT A/P was also obtained that showed pancreatic lesions s/p D50 and initiation of D5LR. Pt was admitted ot MICU for hypoglycemia required Q1 fingersticks. In MICU, pt had persistent hypoglycemia and was started on patient started on d10 in 1 PIV, d10/NS in 2nd PIV. Despite fluids and adequate diet, patient frequently hypoglycemic requiring intermittent amps of d50. MRCP showing pancreatic tail mass likely a neuroendocrine tumor, with an additional cystic lesion in pancreatic body that could be IPMN. Labs showed elevated C-peptide 5.6 and insulin 27.6 with high suspicion for insulinoma. During hospital course, patient started on diazoxide and octreotide which was increased in the setting of persistent hypoglycemia. Due to inadequate glucose delivery, patient had central line placed on 1/6 in order to receive d20. EUS done on 1/7 which showed  2.5cm microcystic lesion in the body of the pancreas, suspicious for a serous cystadenoma, 3.5cm hypoechoic tail of pancreas lesion consistent with suspected pancreatic neuroendocrine tumor. 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 Post op, pt was transferred to SICU, extubated and remain on 3L NC. [place] was DOC on POD1 and passed TOV. POD2 pt was downgraded from SICU to surgical floor.      Endocrinology consulted for workup/treatment recommendations, surgery oncology consulted for possible resection, and advanced GI consulted for EUS for IPMN.   PT/OT evaluated pt and found no needs.     At the time of discharge, the patient was hemodynamically stable, was tolerating PO diet, was voiding urine and passing stool, was ambulating, and was comfortable with adequate pain control. The patient was instructed to follow up with Dr. Woods within 1-2 weeks after discharge from the hospital. The patient felt comfortable with discharge. The patient was discharged to home. The patient had no other issues.       HPI:  43 yo M with pmhx of HTN comes to the ED after experiencing confusion (as he was spraying the ceiling with water) and weakness on 1/2. Patient's symptoms started on 12/30 while he was away in [place] and had 4 episodes of diarrhea at the time with family members experiencing similar symptoms, pt's diarrhea has since resolved prior to arrival to hospital. His ROS otherwise is notable for a history of night sweats and he is unsure if he has experienced unintentional weight loss. In the ED, vitals were wnl and his labs were significant for persistent hypoglycemia and hypoglycemia. A CT A/P was also obtained that showed pancreatic lesions s/p D50 and initiation of D5LR. Pt was admitted ot MICU for hypoglycemia required Q1 fingersticks. In MICU, pt had persistent hypoglycemia and was started on patient started on d10 in 1 PIV, d10/NS in 2nd PIV. Despite fluids and adequate diet, patient frequently hypoglycemic requiring intermittent amps of d50. MRCP showing pancreatic tail mass likely a neuroendocrine tumor, with an additional cystic lesion in pancreatic body that could be IPMN. Labs showed elevated C-peptide 5.6 and insulin 27.6 with high suspicion for insulinoma. During hospital course, patient started on diazoxide and octreotide which was increased in the setting of persistent hypoglycemia. Due to inadequate glucose delivery, patient had central line placed on 1/6 in order to receive d20. EUS done on 1/7 which showed  2.5cm microcystic lesion in the body of the pancreas, suspicious for a serous cystadenoma, 3.5cm hypoechoic tail of pancreas lesion consistent with suspected pancreatic neuroendocrine tumor. 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 Post op, pt was transferred to SICU, extubated and remain on 3L NC. [place] was DOC on POD1 and passed TOV. POD2 pt was downgraded from SICU to surgical floor.      Endocrinology consulted for workup/treatment recommendations, surgery oncology consulted for possible resection, and advanced GI consulted for EUS for IPMN.   PT/OT evaluated pt and found no needs. Prior to discharge pt received post splenectomy vaccines (polyvalent pneumococcal, menin    At the time of discharge, the patient was hemodynamically stable, was tolerating PO diet, was voiding urine and passing stool, was ambulating, and was comfortable with adequate pain control. The patient was instructed to follow up with Dr. Woods within 1-2 weeks after discharge from the hospital. The patient felt comfortable with discharge. The patient was discharged to home. The patient had no other issues.       HPI:  45 yo M with pmhx of HTN comes to the ED after experiencing confusion (as he was spraying the ceiling with water) and weakness on 1/2. Patient's symptoms started on 12/30 while he was away in [place] and had 4 episodes of diarrhea at the time with family members experiencing similar symptoms, pt's diarrhea has since resolved prior to arrival to hospital. His ROS otherwise is notable for a history of night sweats and he is unsure if he has experienced unintentional weight loss. In the ED, vitals were wnl and his labs were significant for persistent hypoglycemia and hypoglycemia. A CT A/P was also obtained that showed pancreatic lesions s/p D50 and initiation of D5LR. Pt was admitted ot MICU for hypoglycemia required Q1 fingersticks. In MICU, pt had persistent hypoglycemia and was started on patient started on d10 in 1 PIV, d10/NS in 2nd PIV. Despite fluids and adequate diet, patient frequently hypoglycemic requiring intermittent amps of d50. MRCP showing pancreatic tail mass likely a neuroendocrine tumor, with an additional cystic lesion in pancreatic body that could be IPMN. Labs showed elevated C-peptide 5.6 and insulin 27.6 with high suspicion for insulinoma. During hospital course, patient started on diazoxide and octreotide which was increased in the setting of persistent hypoglycemia. Due to inadequate glucose delivery, patient had central line placed on 1/6 in order to receive d20. EUS done on 1/7 which showed  2.5cm microcystic lesion in the body of the pancreas, suspicious for a serous cystadenoma, 3.5cm hypoechoic tail of pancreas lesion consistent with suspected pancreatic neuroendocrine tumor. 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 Post op, pt was transferred to SICU, extubated and remain on 3L NC. [place] was DOC on POD1 and passed TOV. POD2 pt was downgraded from SICU to surgical floor.      Endocrinology consulted for workup/treatment recommendations, surgery oncology consulted for possible resection, and advanced GI consulted for EUS for IPMN.   PT/OT evaluated pt and found no needs. Prior to discharge pt received post splenectomy vaccines (polyvalent pneumococcal, meningococcal/diphtheria, meningococcal polysaccharide, haemophilus B).    At the time of discharge, the patient was hemodynamically stable, was tolerating PO diet, was voiding urine and passing stool, was ambulating, and was comfortable with adequate pain control. The patient was instructed to follow up with Dr. Woods within 1-2 weeks after discharge from the hospital. The patient felt comfortable with discharge. The patient was discharged to home. The patient had no other issues.       HPI:  43 yo M with pmhx of HTN comes to the ED after experiencing confusion (as he was spraying the ceiling with water) and weakness on 1/2. Patient's symptoms started on 12/30 while he was away in [place] and had 4 episodes of diarrhea at the time with family members experiencing similar symptoms, pt's diarrhea has since resolved prior to arrival to hospital. His ROS otherwise is notable for a history of night sweats and he is unsure if he has experienced unintentional weight loss. In the ED, vitals were wnl and his labs were significant for persistent hypoglycemia and hypoglycemia. A CT A/P was also obtained that showed pancreatic lesions s/p D50 and initiation of D5LR. Pt was admitted ot MICU for hypoglycemia required Q1 fingersticks. In MICU, pt had persistent hypoglycemia and was started on patient started on d10 in 1 PIV, d10/NS in 2nd PIV. Despite fluids and adequate diet, patient frequently hypoglycemic requiring intermittent amps of d50. MRCP showing pancreatic tail mass likely a neuroendocrine tumor, with an additional cystic lesion in pancreatic body that could be IPMN. Labs showed elevated C-peptide 5.6 and insulin 27.6 with high suspicion for insulinoma. During hospital course, patient started on diazoxide and octreotide which was increased in the setting of persistent hypoglycemia. Due to inadequate glucose delivery, patient had central line placed on 1/6 in order to receive d20. EUS done on 1/7 which showed  2.5cm microcystic lesion in the body of the pancreas, suspicious for a serous cystadenoma, 3.5cm hypoechoic tail of pancreas lesion consistent with suspected pancreatic neuroendocrine tumor. 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 Post op, pt was transferred to SICU, extubated and remain on 3L NC. [place] was DOC on POD1 and passed TOV. POD2 pt was downgraded from SICU to surgical floor.      Endocrinology consulted for workup/treatment recommendations, surgery oncology consulted for possible resection, and advanced GI consulted for EUS for IPMN.   PT/OT evaluated pt and found no needs. Prior to discharge pt received post splenectomy vaccines (polyvalent pneumococcal, meningococcal/diphtheria, meningococcal polysaccharide, haemophilus B).    At the time of discharge, the patient was hemodynamically stable, was tolerating PO diet, was voiding urine and passing stool, was ambulating, and was comfortable with adequate pain control. The patient was instructed to follow up with Dr. Woods within 1 week after discharge for AARON drain removal. The patient felt comfortable with discharge. The patient was discharged to home. The patient had no other issues.

## 2025-01-07 NOTE — PROGRESS NOTE ADULT - ASSESSMENT
44 M with pmhx of HTN who initially presented after experiencing generalized weakness and confusion who was admitted to MICU for significant and persistent hypoglycemia    Neuro:  AOX 3, mental status at baseline. Initially lethargic 2/2 to hypoglycemia   CTH showed no acute intracranial hemorrhage, mass effect, or midline shift.  - CTM    Cardiovascular  # Hypertension  - at home on amlodipine 7.5mg daily   - can continue for now     Respiratory  # non-labored breathing, satting 95% on room air   - no active issues     GI/Nutrition  # Diarrhea   RESOLVED   - had 3-4 days of watery diarrhea in [place]  - other close contacts had similar symptoms, has resolved  - likely viral gastroenteritis, less likely VIPoma given self resolving nature of diarrhea and close contacts with similar symptoms   - can consider GI PCR if diarrhea recurs     #Nutrition  - DASH, TLC    /Renal  - no active issues     ID  - patient afebrile, no leukocytosis   - f/u urine cultures     Endocrine  #Hypoglycemia  #Insulinoma   CT abdomen and pelvis showing lesions in pancreatic tail along with persistent hypoglycemia c/f Insulinoma  Surg onc consulted   s/p central line d20 started  - c/w d20 + d10/NS  - c/w diazoxide per endo   - continue with q2 FS for now   - Increase octreotide to 200mg qd + every 6 hours prn  - f/u hypoglycemic labs   - endocrine following, recs appreciated      Hematology   #LUE swelling  - LUE duplex to r/o DVT     #DVT ppx   - Lovenox     Oncology  #pancreatic tail mass  #insulinoma   CT Abdomen and pelvis showing Lobulated 4.3 cm enhancing pancreatic tail mass, suspicious for a neuroendocrine tumor given the patient's clinical history. Additional 2.1 cm indeterminate hypoattenuating lesion in the pancreatic body.   MR abdomen and MRCP ordered for further Consider contrast enhanced MRI of the abdomen/MRCP for further evaluation.  CT chest w/ IV con w/o mets   MR read w/ possible IPMN in pancreatic body and possible neuroendocrine tumor in pancreatic tail   - Surg onc consulted   - advanced GI consulted for U/S   - f/u gastrin, vasoactive intestinal peptide, chromogranin A, and c-peptide levels    44 M with pmhx of HTN who initially presented after experiencing generalized weakness and confusion who was admitted to MICU for significant and persistent hypoglycemia    Neuro:  AOX 3, mental status at baseline. Initially lethargic 2/2 to hypoglycemia   CTH showed no acute intracranial hemorrhage, mass effect, or midline shift.  - CTM    Cardiovascular  # Hypertension  - at home on amlodipine 7.5mg daily   - can continue for now     Respiratory  # non-labored breathing, satting 95% on room air   - no active issues     GI/Nutrition  # Diarrhea   RESOLVED   - had 3-4 days of watery diarrhea in [place]  - other close contacts had similar symptoms, has resolved  - likely viral gastroenteritis, less likely VIPoma given self resolving nature of diarrhea and close contacts with similar symptoms   - can consider GI PCR if diarrhea recurs     #Nutrition  - DASH, TLC    /Renal  - no active issues     ID  - patient afebrile, no leukocytosis   - f/u urine cultures     Endocrine  #Hypoglycemia  #Insulinoma   CT abdomen and pelvis showing lesions in pancreatic tail along with persistent hypoglycemia c/f Insulinoma  Surg onc consulted   s/p central line d20 started  - c/w d20 + d10/NS  - Diazoxide 200mg q8h per endo   - Q6h FS  - Increase octreotide to 300mg qd + every 6 hours prn  - f/u hypoglycemic labs   - endocrine following, recs appreciated      Hematology   #LUE swelling  - LUE duplex to r/o DVT   - CTM     #DVT ppx   - Lovenox     Oncology  #pancreatic tail mass  #insulinoma   CT Abdomen and pelvis showing Lobulated 4.3 cm enhancing pancreatic tail mass, suspicious for a neuroendocrine tumor given the patient's clinical history. Additional 2.1 cm indeterminate hypoattenuating lesion in the pancreatic body.   MR abdomen and MRCP ordered for further Consider contrast enhanced MRI of the abdomen/MRCP for further evaluation.  CT chest w/ IV con w/o mets w/ thyroid nodule   MR read w/ possible IPMN in pancreatic body and possible neuroendocrine tumor in pancreatic tail   - Surg onc consulted, per note possibly 1/10 procedure at [facility]   - EUS today per GI   - f/u thyroid U/S   - f/u gastrin, vasoactive intestinal peptide, chromogranin A, and c-peptide levels

## 2025-01-07 NOTE — PRE PROCEDURE NOTE - PRE PROCEDURE EVALUATION
Attending Physician: Dr Street                            Procedure: EUS/FNB     Indication for Procedure: insulinoma   ________________________________________________________  PAST MEDICAL & SURGICAL HISTORY:  HTN (hypertension)        ALLERGIES:  No Known Allergies    HOME MEDICATIONS:  AmLODIPine: 7.5 milligram(s) orally once a day    AICD/PPM: [ ] yes   [ ] no    PERTINENT LAB DATA:                        11.3   6.68  )-----------( 310      ( 07 Jan 2025 00:35 )             38.7     01-07    139  |  104  |  4[L]  ----------------------------<  185[H]  3.5   |  24  |  0.65    Ca    8.5      07 Jan 2025 12:11  Phos  2.7     01-07  Mg     1.9     01-07    TPro  6.8  /  Alb  3.4  /  TBili  0.3  /  DBili  x   /  AST  11  /  ALT  17  /  AlkPhos  64  01-07                PHYSICAL EXAMINATION:    T(C): 36.7  HR: 81  BP: 138/73  RR: 13  SpO2: 97%    Constitutional: NAD    Neck:  No JVD  Respiratory: normal effort   Cardiovascular: RRR  Extremities: No peripheral edema  Neurological: A/O x 4, no focal deficits        COMMENTS:    The patient is a suitable candidate for the planned procedure unless box checked [ ]  No, explain:

## 2025-01-07 NOTE — DISCHARGE NOTE PROVIDER - NSDCCPTREATMENT_GEN_ALL_CORE_FT
PRINCIPAL PROCEDURE  Procedure: MR MRCP  Findings and Treatment: IMPRESSION:  1. Unchanged 4.3 cm arterially hyperenhancing pancreatic tail mass,   likely a neuroendocrine tumor.  2. There is also a 2.1 x 2.4 cm septated cystic lesion within the   pancreatic body without appreciable solid component, which is nonspecific   and could represent a mucinous cystadenoma, side branch intraductal   papillary mucinous neoplasm (IPMN), or pseudocyst. There is a history of   prior pancreatitis. Consider endoscopic ultrasound if clinically   appropriate.  3. No evidence of metastatic disease within the abdomen.  --- End of Report ---       PRINCIPAL PROCEDURE  Procedure: Pancreatectomy, distal, robot-assisted, with splenectomy  Findings and Treatment: WOUND CARE: Keep your incision(s) dry and covered with gauze and skin tape and change daily to monitor surrounding/expanding redness or pus drainage   BATHING: Please do not submerge wound underwater. You may shower and/or sponge bathe.  ACTIVITY: No heavy lifting anything more than 10-15lbs or straining. Otherwise, you may return to your usual level of physical activity. If you are taking narcotic pain medication (such as oxycodone), do NOT drive a car, operate machinery, consume alcohol or make important decisions.  DIET: DASH diet  MEDICATIONS: Please take the 500mg Metformin twice a day for 7 days, after completion, start taking 1000mg Metformin twice a day.   NOTIFY YOUR SURGEON IF: You have any bleeding that does not stop, any pus draining from your wound, any fever (over 100.4 F) or chills, persistent nausea/vomiting with inability to tolerate food or liquids, persistent diarrhea, or if your pain is not controlled on your discharge pain medications.  FOLLOW-UP:  1. Please call to make a follow-up appointment with Dr. Woods within 1-2 weeks of discharge   Please call [phone] to schedule a follow up appointment with Endocrinology within 2 weeks.  2. Please follow up with your primary care physician in one week regarding your hospitalization.        SECONDARY PROCEDURE  Procedure: MR MRCP  Findings and Treatment: IMPRESSION:  1. Unchanged 4.3 cm arterially hyperenhancing pancreatic tail mass,   likely a neuroendocrine tumor.  2. There is also a 2.1 x 2.4 cm septated cystic lesion within the   pancreatic body without appreciable solid component, which is nonspecific   and could represent a mucinous cystadenoma, side branch intraductal   papillary mucinous neoplasm (IPMN), or pseudocyst. There is a history of   prior pancreatitis. Consider endoscopic ultrasound if clinically   appropriate.  3. No evidence of metastatic disease within the abdomen.  --- End of Report ---       PRINCIPAL PROCEDURE  Procedure: Pancreatectomy, distal, robot-assisted, with splenectomy  Findings and Treatment: WOUND CARE: Keep your incision(s) dry and covered with gauze and skin tape and change daily to monitor surrounding/expanding redness or pus drainage. Empty and record AARON drain output, this will be removed at your follow up appointment.   BATHING: Please do not submerge wound underwater. You may shower and/or sponge bathe.  ACTIVITY: No heavy lifting anything more than 10-15lbs or straining. Otherwise, you may return to your usual level of physical activity. If you are taking narcotic pain medication (such as oxycodone), do NOT drive a car, operate machinery, consume alcohol or make important decisions.  DIET: DASH diet  MEDICATIONS: Please take the 500mg Metformin twice a day for 7 days, after completion, start taking 1000mg Metformin twice a day.   NOTIFY YOUR SURGEON IF: You have any bleeding that does not stop, any pus draining from your wound, any fever (over 100.4 F) or chills, persistent nausea/vomiting with inability to tolerate food or liquids, persistent diarrhea, or if your pain is not controlled on your discharge pain medications.  FOLLOW-UP:  1. Please call to make a follow-up appointment with Dr. Woods in 1 week of discharge   Please call [phone] to schedule a follow up appointment with Endocrinology within 2 weeks.  2. Please follow up with your primary care physician in one week regarding your hospitalization.        SECONDARY PROCEDURE  Procedure: MR MRCP  Findings and Treatment: IMPRESSION:  1. Unchanged 4.3 cm arterially hyperenhancing pancreatic tail mass,   likely a neuroendocrine tumor.  2. There is also a 2.1 x 2.4 cm septated cystic lesion within the   pancreatic body without appreciable solid component, which is nonspecific   and could represent a mucinous cystadenoma, side branch intraductal   papillary mucinous neoplasm (IPMN), or pseudocyst. There is a history of   prior pancreatitis. Consider endoscopic ultrasound if clinically   appropriate.  3. No evidence of metastatic disease within the abdomen.  --- End of Report ---

## 2025-01-07 NOTE — DISCHARGE NOTE PROVIDER - DETAILS OF MALNUTRITION DIAGNOSIS/DIAGNOSES
This patient has been assessed with a concern for Malnutrition and was treated during this hospitalization for the following Nutrition diagnosis/diagnoses:     -  01/05/2025: Morbid obesity (BMI > 40)

## 2025-01-07 NOTE — DISCHARGE NOTE PROVIDER - CARE PROVIDER_API CALL
[facility]  Surgery  [address] [place] [id number]  Phone: [phone]  Fax: [phone]  Follow Up Time: 2 weeks   [facility]  Surgery  [address] [place] [id number]  Phone: [phone]  Fax: [phone]  Follow Up Time: 1 week

## 2025-01-07 NOTE — DISCHARGE NOTE PROVIDER - NSDCMRMEDTOKEN_GEN_ALL_CORE_FT
AmLODIPine: 7.5 milligram(s) orally once a day   AmLODIPine: 7.5 milligram(s) orally once a day  metFORMIN 1000 mg oral tablet: 1 tab(s) orally 2 times a day Complete 500mg Metformin twice a day for 7 days FIRST,  before starting 1000mg Metformin twice a day  metFORMIN 500 mg oral tablet: 1 tab(s) orally 2 times a day Please take 500mg Metformin twice a day for 7 days FIRST, after completion, then take 1000mg Metformin twice a day  oxyCODONE 5 mg oral tablet: 1 tab(s) orally every 6 hours as needed for  severe pain MDD: 4

## 2025-01-07 NOTE — DISCHARGE NOTE PROVIDER - NSFOLLOWUPCLINICS_GEN_ALL_ED_FT
[facility] Endocrinology  Endocrinology  [address] [place] [address]  Phone: [phone]  Fax:   Follow Up Time: 2 weeks

## 2025-01-07 NOTE — PROGRESS NOTE ADULT - ASSESSMENT
Patient is a 44 year old male with past medical history of hypertension who presented to the hospital with hypoglycemia. 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   - CT abd/pel with PANCREAS: Lobulated enhancing mass at the pancreatic tail measuring 4.3 x 3.7 cm, with abutment of the left kidney at the posterior margin. Additional hypoattenuating lesion in the pancreatic body measuring 2.1 x 1.8 cm.  ADRENALS: Nonspecific mildly thickened bilateral adrenal glands.  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 DDx also includes other causes of insulin-mediated hypoglycemia (i.e insulin antibodies), medication  - AM cortisol 12.6- 1/4/25, ruling out adrenal insufficiency  Recommendations:  - Continue diazoxide to 200 mg q8h  - continue octreotide, increased by primary team to 300 q8h  - continue D20 infusion  - continue to check FSG q4h for now  - hypoglycemia protocol  - follow up sulfonylurea panel, insulin antibodies.   - follow up neuroendocrine tumor markers as baseline: chromogranin A, gastrin, VIP level  - Appreciate surg onc consult, agree with GI consult for EUS. Recommend first line treatment of surgical resection for treatment of insulinoma  - Outpatient genetic testing   - for periprocedural management of hypoglycemia: per recommendations above.     #HTN  Management per primary team, currently on amlodipine 7.5 mg qd    #Thyroid nodule  - Incidental finding on CT chest  - Ultrasound thyroid performed, pending read    Thank you for the consult. Will continue to monitor. Please inform the endocrinology team at least 24 hours prior to intended discharge date.     Pending discussion with attending physician.  INCOMPLETE NOTE  Patient is a 44 year old male with past medical history of hypertension who presented to the hospital with hypoglycemia. 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   - CT abd/pel with PANCREAS: Lobulated enhancing mass at the pancreatic tail measuring 4.3 x 3.7 cm, with abutment of the left kidney at the posterior margin. Additional hypoattenuating lesion in the pancreatic body measuring 2.1 x 1.8 cm.  ADRENALS: Nonspecific mildly thickened bilateral adrenal glands.  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 DDx also includes other causes of insulin-mediated hypoglycemia (i.e insulin antibodies), medication  - AM cortisol 12.6- 1/4/25, ruling out adrenal insufficiency  Recommendations:  - Recommend increasing diazoxide to max dose of 900 mg/day (300 mg TID) and then titrating down dextrose fluids as permitted  - continue octreotide, increased by primary team to 300 q8h  - continue D20 infusion  - continue to check FSG q4h for now  - hypoglycemia protocol  - follow up sulfonylurea panel, insulin antibodies.   - follow up neuroendocrine tumor markers as baseline: chromogranin A, gastrin, VIP level  - Appreciate surg onc consult, agree with GI consult for EUS. Recommend first line treatment of surgical resection for treatment of insulinoma  - Outpatient genetic testing   - for periprocedural management of hypoglycemia: per recommendations above.     #HTN  Management per primary team, currently on amlodipine 7.5 mg qd    #Thyroid nodule  - Incidental finding on CT chest  - Ultrasound thyroid performed, pending read    Thank you for the consult. Will continue to monitor. Please inform the endocrinology team at least 24 hours prior to intended discharge date.     Discussed with attending physician.  Kiran Hyde DO   PGY-4 Endocrine Fellow  Can be reached via [employer] Teams.    For follow up questions, discharge recommendations or new consults, please email [email or web address] ([email or web address] or [email or web address] ([email or web address]) or call the answering service at [phone] (weekdays); [phone] (nights/weekends).   For emergencies, please page fellow on call.

## 2025-01-07 NOTE — PROGRESS NOTE ADULT - SUBJECTIVE AND OBJECTIVE BOX
MICU Progress Note    SUBJECTIVE  INTERVAL EVENTS:  - Central line placed, started d20     OBJECTIVE  Physical Exam:   PHYSICAL EXAM:  GENERAL: No acute distress, well-developed  HEAD:  Atraumatic, Normocephalic  EYES: EOMI, PERRLA, conjunctiva and sclera clear  NECK: Supple, no lymphadenopathy, no JVD  CHEST/LUNG: CTAB; No wheezes, rales, or rhonchi  HEART: Regular rate and rhythm. Normal S1/S2. No murmurs, rubs, or gallops  ABDOMEN: Soft, non-tender, non-distended; normal bowel sounds, no organomegaly  EXTREMITIES:  2+ peripheral pulses b/l, No clubbing, cyanosis, or edema  NEUROLOGY: A&O x 3, no focal deficits  SKIN: No rashes or lesions    ICU Vital Signs Last 24 Hrs  T(F): 98.5 (07 Jan 2025 04:00), Max: 99 (06 Jan 2025 12:00)  HR: 84 (07 Jan 2025 07:00) (80 - 105)  BP: 150/72 (07 Jan 2025 07:00) (111/56 - 176/91)  BP(mean): 103 (07 Jan 2025 07:00) (81 - 124)  ABP: --  ABP(mean): --  RR: 32 (07 Jan 2025 07:00) (13 - 39)  SpO2: 95% (07 Jan 2025 07:00) (91% - 100%)    I&O's Summary    06 Jan 2025 07:01  -  07 Jan 2025 07:00  --------------------------------------------------------  IN: 4808.4 mL / OUT: 3080 mL / NET: 1728.4 mL        ECMO Day#     Adult Advanced Hemodynamics Last 24 Hrs  CVP(mm Hg): --  CVP(cm H2O): --  CO: --  CI: --  PA: --  PA(mean): --  PCWP: --  SVR: --  SVRI: --  PVR: --  PVRI: --    ECMO SETTINGS:  Type:		[ ] Venovenous		[ ] Venoarterial  Cannulation Site(s):     Flow:  	        RPM: 		    Oxygenator:	Sweep:     L/min	FiO2:        LABS:                        11.3   6.68  )-----------( 310      ( 07 Jan 2025 00:35 )             38.7     01-07    140  |  105  |  7   ----------------------------<  95  3.3[L]   |  24  |  0.78    Ca    8.5      07 Jan 2025 00:35  Phos  3.4     01-07  Mg     1.9     01-07    TPro  6.7  /  Alb  3.5  /  TBili  0.3  /  DBili  x   /  AST  12  /  ALT  15  /  AlkPhos  64  01-07          Venous: 01-06-25 @ 16:33 FiO2: 21 Oxygen Sat% 84.0    Urinalysis Basic - ( 07 Jan 2025 00:35 )    Color: x / Appearance: x / SG: x / pH: x  Gluc: 95 mg/dL / Ketone: x  / Bili: x / Urobili: x   Blood: x / Protein: x / Nitrite: x   Leuk Esterase: x / RBC: x / WBC x   Sq Epi: x / Non Sq Epi: x / Bacteria: x          CAPILLARY BLOOD GLUCOSE      POCT Blood Glucose.: 85 mg/dL (07 Jan 2025 06:57)  POCT Blood Glucose.: 78 mg/dL (07 Jan 2025 05:55)  POCT Blood Glucose.: 85 mg/dL (07 Jan 2025 04:06)  POCT Blood Glucose.: 89 mg/dL (07 Jan 2025 02:04)  POCT Blood Glucose.: 86 mg/dL (07 Jan 2025 00:27)  POCT Blood Glucose.: 76 mg/dL (06 Jan 2025 23:06)  POCT Blood Glucose.: 88 mg/dL (06 Jan 2025 20:54)  POCT Blood Glucose.: 98 mg/dL (06 Jan 2025 18:44)  POCT Blood Glucose.: 105 mg/dL (06 Jan 2025 16:42)  POCT Blood Glucose.: 87 mg/dL (06 Jan 2025 14:53)  POCT Blood Glucose.: 69 mg/dL (06 Jan 2025 13:55)  POCT Blood Glucose.: 117 mg/dL (06 Jan 2025 12:50)  POCT Blood Glucose.: 69 mg/dL (06 Jan 2025 12:18)  POCT Blood Glucose.: 96 mg/dL (06 Jan 2025 10:00)  POCT Blood Glucose.: 94 mg/dL (06 Jan 2025 09:08)  POCT Blood Glucose.: 84 mg/dL (06 Jan 2025 08:12)        RADIOLOGY & ADDITIONAL TESTS:  Other Labs  Imaging Personally Reviewed: No interval imaging  Electrocardiogram Personally Reviewed: No interval imaging    Medications and Allergies:   MEDICATIONS  (STANDING):  amLODIPine   Tablet 7.5 milliGRAM(s) Oral daily  chlorhexidine 2% Cloths 1 Application(s) Topical <User Schedule>  chlorhexidine 4% Liquid 1 Application(s) Topical <User Schedule>  dexMEDEtomidine Infusion 1.5 MICROgram(s)/kG/Hr (50.2 mL/Hr) IV Continuous <Continuous>  dextrose 10% + sodium chloride 0.9%. 1000 milliLiter(s) (125 mL/Hr) IV Continuous <Continuous>  dextrose 20%. 500 milliLiter(s) (35 mL/Hr) IV Continuous <Continuous>  diazoxide Suspension 200 milliGRAM(s) Oral every 8 hours  enoxaparin Injectable 40 milliGRAM(s) SubCutaneous every 24 hours  octreotide  Injectable 300 MICROGram(s) IV Push every 8 hours  polyethylene glycol 3350 17 Gram(s) Oral daily    MEDICATIONS  (PRN):  acetaminophen     Tablet .. 650 milliGRAM(s) Oral every 6 hours PRN Mild Pain (1 - 3)  sodium chloride 0.65% Nasal 1 Spray(s) Both Nostrils three times a day PRN Nasal Congestion  sodium chloride 0.9% lock flush 10 milliLiter(s) IV Push every 1 hour PRN Pre/post blood products, medications, blood draw, and to maintain line patency      Antimicrobials            Allergies    No Known Allergies    Intolerances     MICU Progress Note    SUBJECTIVE  INTERVAL EVENTS:  - Central line placed, started d20   - Increased octreotide   - Increased diazoxide  - Plan for EUS today per GI     OBJECTIVE  Physical Exam:   PHYSICAL EXAM:  GENERAL: No acute distress, well-developed  HEAD:  Atraumatic, Normocephalic  EYES: EOMI, PERRLA, conjunctiva and sclera clear  NECK: Supple, no lymphadenopathy, no JVD  CHEST/LUNG: CTAB; No wheezes, rales, or rhonchi  HEART: Regular rate and rhythm. Normal S1/S2. No murmurs, rubs, or gallops  ABDOMEN: Soft, non-tender, non-distended; normal bowel sounds, no organomegaly  EXTREMITIES:  2+ peripheral pulses b/l, No clubbing, cyanosis, or edema  NEUROLOGY: A&O x 3, no focal deficits  SKIN: No rashes or lesions    ICU Vital Signs Last 24 Hrs  T(F): 98.5 (07 Jan 2025 04:00), Max: 99 (06 Jan 2025 12:00)  HR: 84 (07 Jan 2025 07:00) (80 - 105)  BP: 150/72 (07 Jan 2025 07:00) (111/56 - 176/91)  BP(mean): 103 (07 Jan 2025 07:00) (81 - 124)  ABP: --  ABP(mean): --  RR: 32 (07 Jan 2025 07:00) (13 - 39)  SpO2: 95% (07 Jan 2025 07:00) (91% - 100%)    I&O's Summary    06 Jan 2025 07:01  -  07 Jan 2025 07:00  --------------------------------------------------------  IN: 4808.4 mL / OUT: 3080 mL / NET: 1728.4 mL        ECMO Day#     Adult Advanced Hemodynamics Last 24 Hrs  CVP(mm Hg): --  CVP(cm H2O): --  CO: --  CI: --  PA: --  PA(mean): --  PCWP: --  SVR: --  SVRI: --  PVR: --  PVRI: --    ECMO SETTINGS:  Type:		[ ] Venovenous		[ ] Venoarterial  Cannulation Site(s):     Flow:  	        RPM: 		    Oxygenator:	Sweep:     L/min	FiO2:        LABS:                        11.3   6.68  )-----------( 310      ( 07 Jan 2025 00:35 )             38.7     01-07    140  |  105  |  7   ----------------------------<  95  3.3[L]   |  24  |  0.78    Ca    8.5      07 Jan 2025 00:35  Phos  3.4     01-07  Mg     1.9     01-07    TPro  6.7  /  Alb  3.5  /  TBili  0.3  /  DBili  x   /  AST  12  /  ALT  15  /  AlkPhos  64  01-07          Venous: 01-06-25 @ 16:33 FiO2: 21 Oxygen Sat% 84.0    Urinalysis Basic - ( 07 Jan 2025 00:35 )    Color: x / Appearance: x / SG: x / pH: x  Gluc: 95 mg/dL / Ketone: x  / Bili: x / Urobili: x   Blood: x / Protein: x / Nitrite: x   Leuk Esterase: x / RBC: x / WBC x   Sq Epi: x / Non Sq Epi: x / Bacteria: x          CAPILLARY BLOOD GLUCOSE      POCT Blood Glucose.: 85 mg/dL (07 Jan 2025 06:57)  POCT Blood Glucose.: 78 mg/dL (07 Jan 2025 05:55)  POCT Blood Glucose.: 85 mg/dL (07 Jan 2025 04:06)  POCT Blood Glucose.: 89 mg/dL (07 Jan 2025 02:04)  POCT Blood Glucose.: 86 mg/dL (07 Jan 2025 00:27)  POCT Blood Glucose.: 76 mg/dL (06 Jan 2025 23:06)  POCT Blood Glucose.: 88 mg/dL (06 Jan 2025 20:54)  POCT Blood Glucose.: 98 mg/dL (06 Jan 2025 18:44)  POCT Blood Glucose.: 105 mg/dL (06 Jan 2025 16:42)  POCT Blood Glucose.: 87 mg/dL (06 Jan 2025 14:53)  POCT Blood Glucose.: 69 mg/dL (06 Jan 2025 13:55)  POCT Blood Glucose.: 117 mg/dL (06 Jan 2025 12:50)  POCT Blood Glucose.: 69 mg/dL (06 Jan 2025 12:18)  POCT Blood Glucose.: 96 mg/dL (06 Jan 2025 10:00)  POCT Blood Glucose.: 94 mg/dL (06 Jan 2025 09:08)  POCT Blood Glucose.: 84 mg/dL (06 Jan 2025 08:12)        RADIOLOGY & ADDITIONAL TESTS:  Other Labs  Imaging Personally Reviewed: No interval imaging  Electrocardiogram Personally Reviewed: No interval imaging    Medications and Allergies:   MEDICATIONS  (STANDING):  amLODIPine   Tablet 7.5 milliGRAM(s) Oral daily  chlorhexidine 2% Cloths 1 Application(s) Topical <User Schedule>  chlorhexidine 4% Liquid 1 Application(s) Topical <User Schedule>  dexMEDEtomidine Infusion 1.5 MICROgram(s)/kG/Hr (50.2 mL/Hr) IV Continuous <Continuous>  dextrose 10% + sodium chloride 0.9%. 1000 milliLiter(s) (125 mL/Hr) IV Continuous <Continuous>  dextrose 20%. 500 milliLiter(s) (35 mL/Hr) IV Continuous <Continuous>  diazoxide Suspension 200 milliGRAM(s) Oral every 8 hours  enoxaparin Injectable 40 milliGRAM(s) SubCutaneous every 24 hours  octreotide  Injectable 300 MICROGram(s) IV Push every 8 hours  polyethylene glycol 3350 17 Gram(s) Oral daily    MEDICATIONS  (PRN):  acetaminophen     Tablet .. 650 milliGRAM(s) Oral every 6 hours PRN Mild Pain (1 - 3)  sodium chloride 0.65% Nasal 1 Spray(s) Both Nostrils three times a day PRN Nasal Congestion  sodium chloride 0.9% lock flush 10 milliLiter(s) IV Push every 1 hour PRN Pre/post blood products, medications, blood draw, and to maintain line patency      Antimicrobials            Allergies    No Known Allergies    Intolerances

## 2025-01-07 NOTE — PROGRESS NOTE ADULT - SUBJECTIVE AND OBJECTIVE BOX
ENDOCRINE FOLLOW UP     Chief Complaint: nausea  Endocrine consulted for hypoglycemia   History: Patient seen and examined on rounds. No hypoglycemia overnight. Patient reports head rush with octreotide IV push    MEDICATIONS  (STANDING):  amLODIPine   Tablet 7.5 milliGRAM(s) Oral daily  chlorhexidine 2% Cloths 1 Application(s) Topical <User Schedule>  chlorhexidine 4% Liquid 1 Application(s) Topical <User Schedule>  dexMEDEtomidine Infusion 1.5 MICROgram(s)/kG/Hr (50.2 mL/Hr) IV Continuous <Continuous>  dextrose 10% + sodium chloride 0.9%. 1000 milliLiter(s) (125 mL/Hr) IV Continuous <Continuous>  dextrose 20%. 500 milliLiter(s) (50 mL/Hr) IV Continuous <Continuous>  diazoxide Suspension 200 milliGRAM(s) Oral every 8 hours  enoxaparin Injectable 40 milliGRAM(s) SubCutaneous every 24 hours  octreotide  Injectable 300 MICROGram(s) IV Push every 8 hours  polyethylene glycol 3350 17 Gram(s) Oral daily  sodium chloride 0.9%. 500 milliLiter(s) (30 mL/Hr) IV Continuous <Continuous>    MEDICATIONS  (PRN):  acetaminophen     Tablet .. 650 milliGRAM(s) Oral every 6 hours PRN Mild Pain (1 - 3)  sodium chloride 0.65% Nasal 1 Spray(s) Both Nostrils three times a day PRN Nasal Congestion  sodium chloride 0.9% lock flush 10 milliLiter(s) IV Push every 1 hour PRN Pre/post blood products, medications, blood draw, and to maintain line patency      Allergies    No Known Allergies    Intolerances        ROS: All other systems reviewed and negative    PHYSICAL EXAM:  VITALS: T(C): 36.7 (01-07-25 @ 14:34)  T(F): 98.1 (01-07-25 @ 14:34), Max: 98.5 (01-07-25 @ 04:00)  HR: 92 (01-07-25 @ 14:34) (80 - 94)  BP: 139/72 (01-07-25 @ 14:34) (111/56 - 155/74)  RR:  (12 - 32)  SpO2:  (91% - 100%)  Wt(kg): 133.8 kg  GENERAL: NAD, resting comfortably, obese   EYES: No proptosis,  anicteric  HEENT:  Atraumatic, Normocephalic, moist mucous membranes  RESPIRATORY: Nonlabored respirations on room air, normal rate/effort   CARDIOVASCULAR: Regular rate and rhythm; no heave  GI: Soft, nontender, non distended  NEURO: Alert and oriented, moves all extremities spontaneously  PSYCH:  reactive affect, euthymic mood    POCT Blood Glucose.: 83 mg/dL (01-07-25 @ 11:10)  POCT Blood Glucose.: 87 mg/dL (01-07-25 @ 09:09)  POCT Blood Glucose.: 85 mg/dL (01-07-25 @ 06:57)  POCT Blood Glucose.: 78 mg/dL (01-07-25 @ 05:55)  POCT Blood Glucose.: 85 mg/dL (01-07-25 @ 04:06)  POCT Blood Glucose.: 89 mg/dL (01-07-25 @ 02:04)  POCT Blood Glucose.: 86 mg/dL (01-07-25 @ 00:27)  POCT Blood Glucose.: 76 mg/dL (01-06-25 @ 23:06)  POCT Blood Glucose.: 88 mg/dL (01-06-25 @ 20:54)  POCT Blood Glucose.: 98 mg/dL (01-06-25 @ 18:44)  POCT Blood Glucose.: 105 mg/dL (01-06-25 @ 16:42)  POCT Blood Glucose.: 87 mg/dL (01-06-25 @ 14:53)  POCT Blood Glucose.: 69 mg/dL (01-06-25 @ 13:55)  POCT Blood Glucose.: 117 mg/dL (01-06-25 @ 12:50)  POCT Blood Glucose.: 69 mg/dL (01-06-25 @ 12:18)  POCT Blood Glucose.: 96 mg/dL (01-06-25 @ 10:00)  POCT Blood Glucose.: 94 mg/dL (01-06-25 @ 09:08)  POCT Blood Glucose.: 84 mg/dL (01-06-25 @ 08:12)  POCT Blood Glucose.: 121 mg/dL (01-06-25 @ 07:07)  POCT Blood Glucose.: 141 mg/dL (01-06-25 @ 06:43)  POCT Blood Glucose.: 82 mg/dL (01-06-25 @ 06:09)  POCT Blood Glucose.: 80 mg/dL (01-06-25 @ 05:20)  POCT Blood Glucose.: 90 mg/dL (01-06-25 @ 04:10)  POCT Blood Glucose.: 114 mg/dL (01-06-25 @ 03:13)  POCT Blood Glucose.: 107 mg/dL (01-06-25 @ 02:09)  POCT Blood Glucose.: 132 mg/dL (01-06-25 @ 01:05)  POCT Blood Glucose.: 132 mg/dL (01-06-25 @ 00:02)  POCT Blood Glucose.: 147 mg/dL (01-05-25 @ 23:04)  POCT Blood Glucose.: 138 mg/dL (01-05-25 @ 22:08)  POCT Blood Glucose.: 140 mg/dL (01-05-25 @ 21:37)  POCT Blood Glucose.: 128 mg/dL (01-05-25 @ 20:52)  POCT Blood Glucose.: 63 mg/dL (01-05-25 @ 20:25)  POCT Blood Glucose.: 65 mg/dL (01-05-25 @ 20:02)  POCT Blood Glucose.: 80 mg/dL (01-05-25 @ 19:02)  POCT Blood Glucose.: 107 mg/dL (01-05-25 @ 18:01)  POCT Blood Glucose.: 103 mg/dL (01-05-25 @ 17:07)  POCT Blood Glucose.: 101 mg/dL (01-05-25 @ 16:14)  POCT Blood Glucose.: 121 mg/dL (01-05-25 @ 14:57)  POCT Blood Glucose.: 155 mg/dL (01-05-25 @ 14:00)  POCT Blood Glucose.: 154 mg/dL (01-05-25 @ 13:09)  POCT Blood Glucose.: 125 mg/dL (01-05-25 @ 12:11)  POCT Blood Glucose.: 111 mg/dL (01-05-25 @ 11:01)  POCT Blood Glucose.: 113 mg/dL (01-05-25 @ 10:05)  POCT Blood Glucose.: 120 mg/dL (01-05-25 @ 09:00)  POCT Blood Glucose.: 133 mg/dL (01-05-25 @ 08:02)  POCT Blood Glucose.: 132 mg/dL (01-05-25 @ 06:54)  POCT Blood Glucose.: 145 mg/dL (01-05-25 @ 05:58)  POCT Blood Glucose.: 127 mg/dL (01-05-25 @ 05:10)  POCT Blood Glucose.: 131 mg/dL (01-05-25 @ 04:02)  POCT Blood Glucose.: 138 mg/dL (01-05-25 @ 02:53)  POCT Blood Glucose.: 150 mg/dL (01-05-25 @ 01:50)  POCT Blood Glucose.: 144 mg/dL (01-05-25 @ 00:52)  POCT Blood Glucose.: 134 mg/dL (01-04-25 @ 23:57)  POCT Blood Glucose.: 142 mg/dL (01-04-25 @ 22:51)  POCT Blood Glucose.: 137 mg/dL (01-04-25 @ 21:56)  POCT Blood Glucose.: 130 mg/dL (01-04-25 @ 20:56)  POCT Blood Glucose.: 123 mg/dL (01-04-25 @ 19:51)  POCT Blood Glucose.: 103 mg/dL (01-04-25 @ 18:52)  POCT Blood Glucose.: 121 mg/dL (01-04-25 @ 18:11)  POCT Blood Glucose.: 98 mg/dL (01-04-25 @ 17:12)  POCT Blood Glucose.: 116 mg/dL (01-04-25 @ 16:38)  POCT Blood Glucose.: 52 mg/dL (01-04-25 @ 16:19)  POCT Blood Glucose.: 51 mg/dL (01-04-25 @ 16:17)  POCT Blood Glucose.: 121 mg/dL (01-04-25 @ 15:39)  POCT Blood Glucose.: 56 mg/dL (01-04-25 @ 15:07)      01-07    139  |  104  |  4[L]  ----------------------------<  185[H]  3.5   |  24  |  0.65    eGFR: 120    Ca    8.5      01-07  Mg     1.9     01-07  Phos  2.7     01-07    TPro  6.8  /  Alb  3.4  /  TBili  0.3  /  DBili  x   /  AST  11  /  ALT  17  /  AlkPhos  64  01-07      A1C with Estimated Average Glucose Result: 4.9 % (01-03-25 @ 06:20)      Thyroid Stimulating Hormone, Serum: 3.50 uIU/mL (01-03-25 @ 04:55)  Thyroid Stimulating Hormone, Serum: 1.60 uIU/mL (01-02-25 @ 15:54)   ENDOCRINE FOLLOW UP     Chief Complaint: nausea  Endocrine consulted for hypoglycemia   History: Patient seen and examined on rounds. No hypoglycemia overnight. Patient reports head rush with octreotide IV push  For EUS today.     MEDICATIONS  (STANDING):  amLODIPine   Tablet 7.5 milliGRAM(s) Oral daily  chlorhexidine 2% Cloths 1 Application(s) Topical <User Schedule>  chlorhexidine 4% Liquid 1 Application(s) Topical <User Schedule>  dexMEDEtomidine Infusion 1.5 MICROgram(s)/kG/Hr (50.2 mL/Hr) IV Continuous <Continuous>  dextrose 10% + sodium chloride 0.9%. 1000 milliLiter(s) (125 mL/Hr) IV Continuous <Continuous>  dextrose 20%. 500 milliLiter(s) (50 mL/Hr) IV Continuous <Continuous>  diazoxide Suspension 200 milliGRAM(s) Oral every 8 hours  enoxaparin Injectable 40 milliGRAM(s) SubCutaneous every 24 hours  octreotide  Injectable 300 MICROGram(s) IV Push every 8 hours  polyethylene glycol 3350 17 Gram(s) Oral daily  sodium chloride 0.9%. 500 milliLiter(s) (30 mL/Hr) IV Continuous <Continuous>    MEDICATIONS  (PRN):  acetaminophen     Tablet .. 650 milliGRAM(s) Oral every 6 hours PRN Mild Pain (1 - 3)  sodium chloride 0.65% Nasal 1 Spray(s) Both Nostrils three times a day PRN Nasal Congestion  sodium chloride 0.9% lock flush 10 milliLiter(s) IV Push every 1 hour PRN Pre/post blood products, medications, blood draw, and to maintain line patency      Allergies    No Known Allergies    Intolerances        ROS: All other systems reviewed and negative    PHYSICAL EXAM:  VITALS: T(C): 36.7 (01-07-25 @ 14:34)  T(F): 98.1 (01-07-25 @ 14:34), Max: 98.5 (01-07-25 @ 04:00)  HR: 92 (01-07-25 @ 14:34) (80 - 94)  BP: 139/72 (01-07-25 @ 14:34) (111/56 - 155/74)  RR:  (12 - 32)  SpO2:  (91% - 100%)  Wt(kg): 133.8 kg  GENERAL: NAD, resting comfortably, obese   EYES: No proptosis,  anicteric  HEENT:  Atraumatic, Normocephalic, moist mucous membranes  RESPIRATORY: Nonlabored respirations on room air, normal rate/effort   CARDIOVASCULAR: Regular rate and rhythm; no heave  GI: Soft, nontender, non distended  NEURO: Alert and oriented, moves all extremities spontaneously  PSYCH:  reactive affect, euthymic mood    POCT Blood Glucose.: 83 mg/dL (01-07-25 @ 11:10)  POCT Blood Glucose.: 87 mg/dL (01-07-25 @ 09:09)  POCT Blood Glucose.: 85 mg/dL (01-07-25 @ 06:57)  POCT Blood Glucose.: 78 mg/dL (01-07-25 @ 05:55)  POCT Blood Glucose.: 85 mg/dL (01-07-25 @ 04:06)  POCT Blood Glucose.: 89 mg/dL (01-07-25 @ 02:04)  POCT Blood Glucose.: 86 mg/dL (01-07-25 @ 00:27)  POCT Blood Glucose.: 76 mg/dL (01-06-25 @ 23:06)  POCT Blood Glucose.: 88 mg/dL (01-06-25 @ 20:54)  POCT Blood Glucose.: 98 mg/dL (01-06-25 @ 18:44)  POCT Blood Glucose.: 105 mg/dL (01-06-25 @ 16:42)  POCT Blood Glucose.: 87 mg/dL (01-06-25 @ 14:53)  POCT Blood Glucose.: 69 mg/dL (01-06-25 @ 13:55)  POCT Blood Glucose.: 117 mg/dL (01-06-25 @ 12:50)  POCT Blood Glucose.: 69 mg/dL (01-06-25 @ 12:18)  POCT Blood Glucose.: 96 mg/dL (01-06-25 @ 10:00)  POCT Blood Glucose.: 94 mg/dL (01-06-25 @ 09:08)  POCT Blood Glucose.: 84 mg/dL (01-06-25 @ 08:12)  POCT Blood Glucose.: 121 mg/dL (01-06-25 @ 07:07)  POCT Blood Glucose.: 141 mg/dL (01-06-25 @ 06:43)  POCT Blood Glucose.: 82 mg/dL (01-06-25 @ 06:09)  POCT Blood Glucose.: 80 mg/dL (01-06-25 @ 05:20)  POCT Blood Glucose.: 90 mg/dL (01-06-25 @ 04:10)  POCT Blood Glucose.: 114 mg/dL (01-06-25 @ 03:13)  POCT Blood Glucose.: 107 mg/dL (01-06-25 @ 02:09)  POCT Blood Glucose.: 132 mg/dL (01-06-25 @ 01:05)  POCT Blood Glucose.: 132 mg/dL (01-06-25 @ 00:02)  POCT Blood Glucose.: 147 mg/dL (01-05-25 @ 23:04)  POCT Blood Glucose.: 138 mg/dL (01-05-25 @ 22:08)  POCT Blood Glucose.: 140 mg/dL (01-05-25 @ 21:37)  POCT Blood Glucose.: 128 mg/dL (01-05-25 @ 20:52)  POCT Blood Glucose.: 63 mg/dL (01-05-25 @ 20:25)  POCT Blood Glucose.: 65 mg/dL (01-05-25 @ 20:02)  POCT Blood Glucose.: 80 mg/dL (01-05-25 @ 19:02)  POCT Blood Glucose.: 107 mg/dL (01-05-25 @ 18:01)  POCT Blood Glucose.: 103 mg/dL (01-05-25 @ 17:07)  POCT Blood Glucose.: 101 mg/dL (01-05-25 @ 16:14)  POCT Blood Glucose.: 121 mg/dL (01-05-25 @ 14:57)  POCT Blood Glucose.: 155 mg/dL (01-05-25 @ 14:00)  POCT Blood Glucose.: 154 mg/dL (01-05-25 @ 13:09)  POCT Blood Glucose.: 125 mg/dL (01-05-25 @ 12:11)  POCT Blood Glucose.: 111 mg/dL (01-05-25 @ 11:01)  POCT Blood Glucose.: 113 mg/dL (01-05-25 @ 10:05)  POCT Blood Glucose.: 120 mg/dL (01-05-25 @ 09:00)  POCT Blood Glucose.: 133 mg/dL (01-05-25 @ 08:02)  POCT Blood Glucose.: 132 mg/dL (01-05-25 @ 06:54)  POCT Blood Glucose.: 145 mg/dL (01-05-25 @ 05:58)  POCT Blood Glucose.: 127 mg/dL (01-05-25 @ 05:10)  POCT Blood Glucose.: 131 mg/dL (01-05-25 @ 04:02)  POCT Blood Glucose.: 138 mg/dL (01-05-25 @ 02:53)  POCT Blood Glucose.: 150 mg/dL (01-05-25 @ 01:50)  POCT Blood Glucose.: 144 mg/dL (01-05-25 @ 00:52)  POCT Blood Glucose.: 134 mg/dL (01-04-25 @ 23:57)  POCT Blood Glucose.: 142 mg/dL (01-04-25 @ 22:51)  POCT Blood Glucose.: 137 mg/dL (01-04-25 @ 21:56)  POCT Blood Glucose.: 130 mg/dL (01-04-25 @ 20:56)  POCT Blood Glucose.: 123 mg/dL (01-04-25 @ 19:51)  POCT Blood Glucose.: 103 mg/dL (01-04-25 @ 18:52)  POCT Blood Glucose.: 121 mg/dL (01-04-25 @ 18:11)  POCT Blood Glucose.: 98 mg/dL (01-04-25 @ 17:12)  POCT Blood Glucose.: 116 mg/dL (01-04-25 @ 16:38)  POCT Blood Glucose.: 52 mg/dL (01-04-25 @ 16:19)  POCT Blood Glucose.: 51 mg/dL (01-04-25 @ 16:17)  POCT Blood Glucose.: 121 mg/dL (01-04-25 @ 15:39)  POCT Blood Glucose.: 56 mg/dL (01-04-25 @ 15:07)      01-07    139  |  104  |  4[L]  ----------------------------<  185[H]  3.5   |  24  |  0.65    eGFR: 120    Ca    8.5      01-07  Mg     1.9     01-07  Phos  2.7     01-07    TPro  6.8  /  Alb  3.4  /  TBili  0.3  /  DBili  x   /  AST  11  /  ALT  17  /  AlkPhos  64  01-07      A1C with Estimated Average Glucose Result: 4.9 % (01-03-25 @ 06:20)      Thyroid Stimulating Hormone, Serum: 3.50 uIU/mL (01-03-25 @ 04:55)  Thyroid Stimulating Hormone, Serum: 1.60 uIU/mL (01-02-25 @ 15:54)

## 2025-01-08 DIAGNOSIS — E04.1 NONTOXIC SINGLE THYROID NODULE: ICD-10-CM

## 2025-01-08 LAB
ALBUMIN SERPL ELPH-MCNC: 3.4 G/DL — SIGNIFICANT CHANGE UP (ref 3.3–5)
ALBUMIN SERPL ELPH-MCNC: 3.6 G/DL — SIGNIFICANT CHANGE UP (ref 3.3–5)
ALP SERPL-CCNC: 60 U/L — SIGNIFICANT CHANGE UP (ref 40–120)
ALP SERPL-CCNC: 64 U/L — SIGNIFICANT CHANGE UP (ref 40–120)
ALT FLD-CCNC: 13 U/L — SIGNIFICANT CHANGE UP (ref 10–45)
ALT FLD-CCNC: 14 U/L — SIGNIFICANT CHANGE UP (ref 10–45)
ANION GAP SERPL CALC-SCNC: 10 MMOL/L — SIGNIFICANT CHANGE UP (ref 5–17)
ANION GAP SERPL CALC-SCNC: 12 MMOL/L — SIGNIFICANT CHANGE UP (ref 5–17)
AST SERPL-CCNC: 10 U/L — SIGNIFICANT CHANGE UP (ref 10–40)
AST SERPL-CCNC: 9 U/L — LOW (ref 10–40)
BASOPHILS # BLD AUTO: 0.03 K/UL — SIGNIFICANT CHANGE UP (ref 0–0.2)
BASOPHILS NFR BLD AUTO: 0.4 % — SIGNIFICANT CHANGE UP (ref 0–2)
BILIRUB SERPL-MCNC: 0.3 MG/DL — SIGNIFICANT CHANGE UP (ref 0.2–1.2)
BILIRUB SERPL-MCNC: 0.3 MG/DL — SIGNIFICANT CHANGE UP (ref 0.2–1.2)
BUN SERPL-MCNC: 6 MG/DL — LOW (ref 7–23)
BUN SERPL-MCNC: 6 MG/DL — LOW (ref 7–23)
CALCIUM SERPL-MCNC: 8.2 MG/DL — LOW (ref 8.4–10.5)
CALCIUM SERPL-MCNC: 8.6 MG/DL — SIGNIFICANT CHANGE UP (ref 8.4–10.5)
CHLORIDE SERPL-SCNC: 104 MMOL/L — SIGNIFICANT CHANGE UP (ref 96–108)
CHLORIDE SERPL-SCNC: 105 MMOL/L — SIGNIFICANT CHANGE UP (ref 96–108)
CO2 SERPL-SCNC: 25 MMOL/L — SIGNIFICANT CHANGE UP (ref 22–31)
CO2 SERPL-SCNC: 26 MMOL/L — SIGNIFICANT CHANGE UP (ref 22–31)
CREAT SERPL-MCNC: 0.77 MG/DL — SIGNIFICANT CHANGE UP (ref 0.5–1.3)
CREAT SERPL-MCNC: 0.83 MG/DL — SIGNIFICANT CHANGE UP (ref 0.5–1.3)
EGFR: 111 ML/MIN/1.73M2 — SIGNIFICANT CHANGE UP
EGFR: 114 ML/MIN/1.73M2 — SIGNIFICANT CHANGE UP
EOSINOPHIL # BLD AUTO: 0.36 K/UL — SIGNIFICANT CHANGE UP (ref 0–0.5)
EOSINOPHIL NFR BLD AUTO: 5.4 % — SIGNIFICANT CHANGE UP (ref 0–6)
FLUAV AG NPH QL: SIGNIFICANT CHANGE UP
FLUBV AG NPH QL: SIGNIFICANT CHANGE UP
GAS PNL BLDV: SIGNIFICANT CHANGE UP
GLUCOSE BLDC GLUCOMTR-MCNC: 123 MG/DL — HIGH (ref 70–99)
GLUCOSE BLDC GLUCOMTR-MCNC: 85 MG/DL — SIGNIFICANT CHANGE UP (ref 70–99)
GLUCOSE BLDC GLUCOMTR-MCNC: 91 MG/DL — SIGNIFICANT CHANGE UP (ref 70–99)
GLUCOSE SERPL-MCNC: 114 MG/DL — HIGH (ref 70–99)
GLUCOSE SERPL-MCNC: 80 MG/DL — SIGNIFICANT CHANGE UP (ref 70–99)
HCT VFR BLD CALC: 39.4 % — SIGNIFICANT CHANGE UP (ref 39–50)
HGB BLD-MCNC: 11.5 G/DL — LOW (ref 13–17)
IMM GRANULOCYTES NFR BLD AUTO: 0.4 % — SIGNIFICANT CHANGE UP (ref 0–0.9)
LYMPHOCYTES # BLD AUTO: 1.4 K/UL — SIGNIFICANT CHANGE UP (ref 1–3.3)
LYMPHOCYTES # BLD AUTO: 21 % — SIGNIFICANT CHANGE UP (ref 13–44)
MAGNESIUM SERPL-MCNC: 1.8 MG/DL — SIGNIFICANT CHANGE UP (ref 1.6–2.6)
MAGNESIUM SERPL-MCNC: 2.2 MG/DL — SIGNIFICANT CHANGE UP (ref 1.6–2.6)
MCHC RBC-ENTMCNC: 20.1 PG — LOW (ref 27–34)
MCHC RBC-ENTMCNC: 29.2 G/DL — LOW (ref 32–36)
MCV RBC AUTO: 68.8 FL — LOW (ref 80–100)
MONOCYTES # BLD AUTO: 0.54 K/UL — SIGNIFICANT CHANGE UP (ref 0–0.9)
MONOCYTES NFR BLD AUTO: 8.1 % — SIGNIFICANT CHANGE UP (ref 2–14)
NEUTROPHILS # BLD AUTO: 4.32 K/UL — SIGNIFICANT CHANGE UP (ref 1.8–7.4)
NEUTROPHILS NFR BLD AUTO: 64.7 % — SIGNIFICANT CHANGE UP (ref 43–77)
NRBC # BLD: 0 /100 WBCS — SIGNIFICANT CHANGE UP (ref 0–0)
PHOSPHATE SERPL-MCNC: 2.4 MG/DL — LOW (ref 2.5–4.5)
PHOSPHATE SERPL-MCNC: 2.9 MG/DL — SIGNIFICANT CHANGE UP (ref 2.5–4.5)
PLATELET # BLD AUTO: 315 K/UL — SIGNIFICANT CHANGE UP (ref 150–400)
POTASSIUM SERPL-MCNC: 3.4 MMOL/L — LOW (ref 3.5–5.3)
POTASSIUM SERPL-MCNC: 3.7 MMOL/L — SIGNIFICANT CHANGE UP (ref 3.5–5.3)
POTASSIUM SERPL-SCNC: 3.4 MMOL/L — LOW (ref 3.5–5.3)
POTASSIUM SERPL-SCNC: 3.7 MMOL/L — SIGNIFICANT CHANGE UP (ref 3.5–5.3)
PROT SERPL-MCNC: 6.4 G/DL — SIGNIFICANT CHANGE UP (ref 6–8.3)
PROT SERPL-MCNC: 6.9 G/DL — SIGNIFICANT CHANGE UP (ref 6–8.3)
RBC # BLD: 5.73 M/UL — SIGNIFICANT CHANGE UP (ref 4.2–5.8)
RBC # FLD: 16.8 % — HIGH (ref 10.3–14.5)
RSV RNA NPH QL NAA+NON-PROBE: SIGNIFICANT CHANGE UP
SARS-COV-2 RNA SPEC QL NAA+PROBE: SIGNIFICANT CHANGE UP
SODIUM SERPL-SCNC: 140 MMOL/L — SIGNIFICANT CHANGE UP (ref 135–145)
SODIUM SERPL-SCNC: 142 MMOL/L — SIGNIFICANT CHANGE UP (ref 135–145)
WBC # BLD: 6.68 K/UL — SIGNIFICANT CHANGE UP (ref 3.8–10.5)
WBC # FLD AUTO: 6.68 K/UL — SIGNIFICANT CHANGE UP (ref 3.8–10.5)

## 2025-01-08 PROCEDURE — 99291 CRITICAL CARE FIRST HOUR: CPT | Mod: GC

## 2025-01-08 PROCEDURE — 99232 SBSQ HOSP IP/OBS MODERATE 35: CPT | Mod: GC

## 2025-01-08 PROCEDURE — 99231 SBSQ HOSP IP/OBS SF/LOW 25: CPT | Mod: GC

## 2025-01-08 RX ORDER — MAGNESIUM SULFATE 500 MG/ML
2 INJECTION, SOLUTION INTRAMUSCULAR; INTRAVENOUS ONCE
Refills: 0 | Status: COMPLETED | OUTPATIENT
Start: 2025-01-08 | End: 2025-01-08

## 2025-01-08 RX ORDER — POTASSIUM CHLORIDE 600 MG/1
40 TABLET, FILM COATED, EXTENDED RELEASE ORAL ONCE
Refills: 0 | Status: COMPLETED | OUTPATIENT
Start: 2025-01-08 | End: 2025-01-08

## 2025-01-08 RX ORDER — FLUTICASONE PROPIONATE 50 UG/1
1 SPRAY, METERED NASAL
Refills: 0 | Status: DISCONTINUED | OUTPATIENT
Start: 2025-01-08 | End: 2025-01-10

## 2025-01-08 RX ADMIN — OCTREOTIDE ACETATE 300 MICROGRAM(S): KIT INTRAMUSCULAR at 22:13

## 2025-01-08 RX ADMIN — OCTREOTIDE ACETATE 300 MICROGRAM(S): KIT INTRAMUSCULAR at 13:47

## 2025-01-08 RX ADMIN — Medication 7.5 MILLIGRAM(S): at 06:02

## 2025-01-08 RX ADMIN — DEXTROSE MONOHYDRATE 50 MILLILITER(S): 100 INJECTION, SOLUTION INTRAVENOUS at 18:00

## 2025-01-08 RX ADMIN — DEXTROSE MONOHYDRATE 50 MILLILITER(S): 100 INJECTION, SOLUTION INTRAVENOUS at 07:00

## 2025-01-08 RX ADMIN — SODIUM CHLORIDE 125 MILLILITER(S): 9 INJECTION, SOLUTION INTRAVENOUS at 22:12

## 2025-01-08 RX ADMIN — MAGNESIUM SULFATE 25 GRAM(S): 500 INJECTION, SOLUTION INTRAMUSCULAR; INTRAVENOUS at 02:41

## 2025-01-08 RX ADMIN — DEXTROSE MONOHYDRATE 50 MILLILITER(S): 100 INJECTION, SOLUTION INTRAVENOUS at 15:00

## 2025-01-08 RX ADMIN — DEXTROSE MONOHYDRATE 50 MILLILITER(S): 100 INJECTION, SOLUTION INTRAVENOUS at 17:00

## 2025-01-08 RX ADMIN — DEXTROSE MONOHYDRATE 50 MILLILITER(S): 100 INJECTION, SOLUTION INTRAVENOUS at 06:03

## 2025-01-08 RX ADMIN — OCTREOTIDE ACETATE 300 MICROGRAM(S): KIT INTRAMUSCULAR at 06:03

## 2025-01-08 RX ADMIN — DEXTROSE MONOHYDRATE 50 MILLILITER(S): 100 INJECTION, SOLUTION INTRAVENOUS at 14:00

## 2025-01-08 RX ADMIN — DEXTROSE MONOHYDRATE 50 MILLILITER(S): 100 INJECTION, SOLUTION INTRAVENOUS at 02:00

## 2025-01-08 RX ADMIN — Medication 300 MILLIGRAM(S): at 22:13

## 2025-01-08 RX ADMIN — ACETAMINOPHEN 650 MILLIGRAM(S): 80 SOLUTION/ DROPS ORAL at 09:26

## 2025-01-08 RX ADMIN — DEXTROSE MONOHYDRATE 50 MILLILITER(S): 100 INJECTION, SOLUTION INTRAVENOUS at 03:00

## 2025-01-08 RX ADMIN — DEXTROSE MONOHYDRATE 50 MILLILITER(S): 100 INJECTION, SOLUTION INTRAVENOUS at 08:00

## 2025-01-08 RX ADMIN — DEXTROSE MONOHYDRATE 50 MILLILITER(S): 100 INJECTION, SOLUTION INTRAVENOUS at 12:00

## 2025-01-08 RX ADMIN — DEXTROSE MONOHYDRATE 50 MILLILITER(S): 100 INJECTION, SOLUTION INTRAVENOUS at 11:00

## 2025-01-08 RX ADMIN — CHLORHEXIDINE GLUCONATE 1 APPLICATION(S): 1.2 RINSE ORAL at 06:04

## 2025-01-08 RX ADMIN — DEXTROSE MONOHYDRATE 50 MILLILITER(S): 100 INJECTION, SOLUTION INTRAVENOUS at 23:00

## 2025-01-08 RX ADMIN — SODIUM CHLORIDE 125 MILLILITER(S): 9 INJECTION, SOLUTION INTRAVENOUS at 06:03

## 2025-01-08 RX ADMIN — Medication 300 MILLIGRAM(S): at 13:47

## 2025-01-08 RX ADMIN — DEXTROSE MONOHYDRATE 50 MILLILITER(S): 100 INJECTION, SOLUTION INTRAVENOUS at 16:00

## 2025-01-08 RX ADMIN — DEXTROSE MONOHYDRATE 50 MILLILITER(S): 100 INJECTION, SOLUTION INTRAVENOUS at 09:00

## 2025-01-08 RX ADMIN — DEXTROSE MONOHYDRATE 50 MILLILITER(S): 100 INJECTION, SOLUTION INTRAVENOUS at 19:00

## 2025-01-08 RX ADMIN — ACETAMINOPHEN 650 MILLIGRAM(S): 80 SOLUTION/ DROPS ORAL at 08:56

## 2025-01-08 RX ADMIN — DEXTROSE MONOHYDRATE 50 MILLILITER(S): 100 INJECTION, SOLUTION INTRAVENOUS at 13:00

## 2025-01-08 RX ADMIN — DEXTROSE MONOHYDRATE 50 MILLILITER(S): 100 INJECTION, SOLUTION INTRAVENOUS at 22:12

## 2025-01-08 RX ADMIN — Medication 300 MILLIGRAM(S): at 06:03

## 2025-01-08 RX ADMIN — ENOXAPARIN SODIUM 40 MILLIGRAM(S): 60 INJECTION INTRAVENOUS; SUBCUTANEOUS at 13:47

## 2025-01-08 RX ADMIN — POTASSIUM CHLORIDE 40 MILLIEQUIVALENT(S): 600 TABLET, FILM COATED, EXTENDED RELEASE ORAL at 06:02

## 2025-01-08 NOTE — PROGRESS NOTE ADULT - ASSESSMENT
Patient is a 44 year old male with past medical history of hypertension who presented to the hospital with hypoglycemia. 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   - CT abd/pel with PANCREAS: Lobulated enhancing mass at the pancreatic tail measuring 4.3 x 3.7 cm, with abutment of the left kidney at the posterior margin. Additional hypoattenuating lesion in the pancreatic body measuring 2.1 x 1.8 cm.  ADRENALS: Nonspecific mildly thickened bilateral adrenal glands.  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 DDx also includes other causes of insulin-mediated hypoglycemia (i.e insulin antibodies), medication  - AM cortisol 12.6- 1/4/25, ruling out adrenal insufficiency  Recommendations:  - Continue diazoxide to max dose of 900 mg/day (300 mg TID). Titrate down dextrose fluids as permitted  - continue octreotide, increased by primary team to 300 q8h  - continue D20 infusion, titrate as permitted  - continue to check FSG q4h for now  - hypoglycemia protocol  - follow up sulfonylurea panel, insulin antibodies.   - follow up neuroendocrine tumor markers as baseline: chromogranin A, gastrin, VIP level  - Appreciate surg onc consult, agree with GI consult for EUS. Recommend first line treatment of surgical resection for treatment of insulinoma  - Outpatient genetic testing   - Can check PTH as mother had a PTH problem and insulinoma is rarely associated with MEN1.   - Pending transfer to [facility], resection tentatively planned for 1/10.     #HTN  Management per primary team, currently on amlodipine 7.5 mg qd    #Thyroid nodule  Incidental finding on CT chest  US performed 1/7/25: Right thyroid nodule: --Nodule 1: - In the medial lower pole/isthmus, measuring 2.0 x 1.0 x 2.1   cm predominantly solid, hypoechoic, TIRADS 4. Moderate vascularity is demonstrated within this nodule with color Doppler.  PLAN:  - FNA indicated, can be done outpatient at office visit or inpatient to expedite care.       Thank you for the consult. Will continue to monitor. Please inform the endocrinology team at least 24 hours prior to intended discharge date.     Discussed with attending physician.  Kiran Hyde DO   PGY-4 Endocrine Fellow  Can be reached via [employer] Teams.    For follow up questions, discharge recommendations or new consults, please email [email or web address] or [email or web address] ([email or web address]) or call the answering service at [phone] (weekdays); [phone] (nights/weekends).   For emergencies, please page fellow on call.  Patient is a 44 year old male with past medical history of hypertension who presented to the hospital with hypoglycemia. 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    - Unclear if patient is meeting Whipple's triad at this time as he does not have classic hypoglycemia symptoms and has not been adequately treated to bring his glucose back up.   - CT abd/pel with PANCREAS: Lobulated enhancing mass at the pancreatic tail measuring 4.3 x 3.7 cm, with abutment of the left kidney at the posterior margin. Additional hypoattenuating lesion in the pancreatic body measuring 2.1 x 1.8 cm.  ADRENALS: Nonspecific mildly thickened bilateral adrenal glands.  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 DDx also includes other causes of insulin-mediated hypoglycemia (i.e insulin antibodies), medication  - AM cortisol 12.6- 1/4/25, ruling out adrenal insufficiency  Recommendations:  - Continue diazoxide to max dose of 900 mg/day (300 mg TID). Titrate down dextrose fluids as permitted  - continue octreotide, increased by primary team to 300 q8h  - continue D20 infusion, titrate as permitted  - continue to check FSG q4h for now  - hypoglycemia protocol  - follow up sulfonylurea panel, insulin antibodies.   - follow up neuroendocrine tumor markers as baseline: chromogranin A, gastrin, VIP level  - Appreciate surg onc consult, agree with GI consult for EUS. Recommend first line treatment of surgical resection for treatment of insulinoma  - Outpatient genetic testing   - Can check PTH as mother had a PTH problem and insulinoma is rarely associated with MEN1.   - Pending transfer to [facility], resection tentatively planned for 1/10.     #HTN  Management per primary team, currently on amlodipine 7.5 mg qd    #Thyroid nodule  Incidental finding on CT chest  US performed 1/7/25: Right thyroid nodule: --Nodule 1: - In the medial lower pole/isthmus, measuring 2.0 x 1.0 x 2.1   cm predominantly solid, hypoechoic, TIRADS 4. Moderate vascularity is demonstrated within this nodule with color Doppler.  PLAN:  - FNA indicated, can be done outpatient at office visit or inpatient to expedite care.       Thank you for the consult. Will continue to monitor. Please inform the endocrinology team at least 24 hours prior to intended discharge date.     Discussed with attending physician.  Kiran Hyde DO   PGY-4 Endocrine Fellow  Can be reached via [employer] Teams.    For follow up questions, discharge recommendations or new consults, please email [email or web address] ([email or web address]) or [email or web address] ([facility]) or call the answering service at [phone] (weekdays); [phone] (nights/weekends).   For emergencies, please page fellow on call.

## 2025-01-08 NOTE — PROGRESS NOTE ADULT - SUBJECTIVE AND OBJECTIVE BOX
Gastroenterology/Hepatology Progress Note    Interval Events:  -no acute ON events  -pt tolerated PO intake without issues   -reports bloating  -s/p EUS with 2.5cm microcystic lesion in the body of the pancreas, suspicious for a serous cystadenoma. FNA obtained for cytology/CEA/Interpace. 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  -pending transfer to [facility], pending SICU bed       Allergies:  No Known Allergies      Hospital Medications:  acetaminophen     Tablet .. 650 milliGRAM(s) Oral every 6 hours PRN  amLODIPine   Tablet 7.5 milliGRAM(s) Oral daily  chlorhexidine 2% Cloths 1 Application(s) Topical <User Schedule>  chlorhexidine 4% Liquid 1 Application(s) Topical <User Schedule>  dextrose 10% + sodium chloride 0.9%. 1000 milliLiter(s) IV Continuous <Continuous>  dextrose 20%. 500 milliLiter(s) IV Continuous <Continuous>  diazoxide Suspension 300 milliGRAM(s) Oral every 8 hours  enoxaparin Injectable 40 milliGRAM(s) SubCutaneous every 24 hours  octreotide  Injectable 300 MICROGram(s) IV Push every 8 hours  polyethylene glycol 3350 17 Gram(s) Oral daily  sodium chloride 0.65% Nasal 1 Spray(s) Both Nostrils three times a day PRN  sodium chloride 0.9% lock flush 10 milliLiter(s) IV Push every 1 hour PRN      ROS: 14 point ROS negative unless otherwise state in subjective    PHYSICAL EXAM:   Vital Signs:  Vital Signs Last 24 Hrs  T(C): 36.7 (08 Jan 2025 08:00), Max: 36.8 (07 Jan 2025 17:00)  T(F): 98 (08 Jan 2025 08:00), Max: 98.3 (07 Jan 2025 17:00)  HR: 90 (08 Jan 2025 09:00) (80 - 103)  BP: 141/72 (08 Jan 2025 09:00) (101/57 - 149/67)  BP(mean): 94 (08 Jan 2025 09:00) (77 - 104)  RR: 22 (08 Jan 2025 09:00) (12 - 35)  SpO2: 94% (08 Jan 2025 09:00) (92% - 100%)    Parameters below as of 08 Jan 2025 08:00  Patient On (Oxygen Delivery Method): room air      Daily     Daily     GEN: NAD, normocephalic  CVS: S1S2+  CHEST: clear to auscultation  ABD: soft , nontender, nondistended, bowel sounds present  EXTR: no cyanosis, no clubbing, no edema  NEURO: A&OX3  SKIN:  warm;  non icteric    LABS:                        11.5   6.68  )-----------( 315      ( 08 Jan 2025 00:33 )             39.4     Mean Cell Volume: 68.8 fl (01-08-25 @ 00:33)    01-08    140  |  105  |  6[L]  ----------------------------<  80  3.4[L]   |  25  |  0.83    Ca    8.6      08 Jan 2025 00:32  Phos  2.9     01-08  Mg     1.8     01-08    TPro  6.4  /  Alb  3.4  /  TBili  0.3  /  DBili  x   /  AST  9[L]  /  ALT  14  /  AlkPhos  60  01-08    LIVER FUNCTIONS - ( 08 Jan 2025 00:32 )  Alb: 3.4 g/dL / Pro: 6.4 g/dL / ALK PHOS: 60 U/L / ALT: 14 U/L / AST: 9 U/L / GGT: x             Urinalysis Basic - ( 08 Jan 2025 00:32 )    Color: x / Appearance: x / SG: x / pH: x  Gluc: 80 mg/dL / Ketone: x  / Bili: x / Urobili: x   Blood: x / Protein: x / Nitrite: x   Leuk Esterase: x / RBC: x / WBC x   Sq Epi: x / Non Sq Epi: x / Bacteria: x            Imaging:           Gastroenterology/Hepatology Progress Note    Interval Events:  -no acute ON events  -pt tolerated PO intake without issues   -reports bloating  -s/p EUS with 2.5cm microcystic lesion in the body of the pancreas, suspicious for a serous cystadenoma. FNA obtained for cytology/CEA/Interpace. 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  -pending transfer to [facility], pending SICU bed       Allergies:  No Known Allergies      Hospital Medications:  acetaminophen     Tablet .. 650 milliGRAM(s) Oral every 6 hours PRN  amLODIPine   Tablet 7.5 milliGRAM(s) Oral daily  chlorhexidine 2% Cloths 1 Application(s) Topical <User Schedule>  chlorhexidine 4% Liquid 1 Application(s) Topical <User Schedule>  dextrose 10% + sodium chloride 0.9%. 1000 milliLiter(s) IV Continuous <Continuous>  dextrose 20%. 500 milliLiter(s) IV Continuous <Continuous>  diazoxide Suspension 300 milliGRAM(s) Oral every 8 hours  enoxaparin Injectable 40 milliGRAM(s) SubCutaneous every 24 hours  octreotide  Injectable 300 MICROGram(s) IV Push every 8 hours  polyethylene glycol 3350 17 Gram(s) Oral daily  sodium chloride 0.65% Nasal 1 Spray(s) Both Nostrils three times a day PRN  sodium chloride 0.9% lock flush 10 milliLiter(s) IV Push every 1 hour PRN      ROS: 14 point ROS negative unless otherwise state in subjective    PHYSICAL EXAM:   Vital Signs:  Vital Signs Last 24 Hrs  T(C): 36.7 (08 Jan 2025 08:00), Max: 36.8 (07 Jan 2025 17:00)  T(F): 98 (08 Jan 2025 08:00), Max: 98.3 (07 Jan 2025 17:00)  HR: 90 (08 Jan 2025 09:00) (80 - 103)  BP: 141/72 (08 Jan 2025 09:00) (101/57 - 149/67)  BP(mean): 94 (08 Jan 2025 09:00) (77 - 104)  RR: 22 (08 Jan 2025 09:00) (12 - 35)  SpO2: 94% (08 Jan 2025 09:00) (92% - 100%)    Parameters below as of 08 Jan 2025 08:00  Patient On (Oxygen Delivery Method): room air      Daily     Daily     GEN: NAD, normocephalic  CVS: S1S2+  CHEST: clear to auscultation  ABD: soft , nontender, nondistended, bowel sounds present  EXTR: no cyanosis, no clubbing, no edema  NEURO: A&OX3  SKIN:  warm;  non icteric    LABS:                        11.5   6.68  )-----------( 315      ( 08 Jan 2025 00:33 )             39.4     Mean Cell Volume: 68.8 fl (01-08-25 @ 00:33)    01-08    140  |  105  |  6[L]  ----------------------------<  80  3.4[L]   |  25  |  0.83    Ca    8.6      08 Jan 2025 00:32  Phos  2.9     01-08  Mg     1.8     01-08    TPro  6.4  /  Alb  3.4  /  TBili  0.3  /  DBili  x   /  AST  9[L]  /  ALT  14  /  AlkPhos  60  01-08    LIVER FUNCTIONS - ( 08 Jan 2025 00:32 )  Alb: 3.4 g/dL / Pro: 6.4 g/dL / ALK PHOS: 60 U/L / ALT: 14 U/L / AST: 9 U/L / GGT: x

## 2025-01-08 NOTE — PROGRESS NOTE ADULT - SUBJECTIVE AND OBJECTIVE BOX
ENDOCRINE FOLLOW UP     Chief Complaint: altered mental status  Endocrine consulted for hypoglycemia  History: Patient seen and examined on rounds. No acute complaints.   Patient and wife at bedside were able to provide additional history today.   Previously, patient denied weight gain but wife reports that patient gained 40 lbs over the past year. She also states that patient has been waking up to eat. Patient told me that upon further contemplation he noticed that he would get shaky before meals and that would get better when he would eat a meal. He attributed this to hunger.  He states that his mother had a history of elevated parathyroid levels. Likely tertiary hyperparathyroidism.     MEDICATIONS  (STANDING):  amLODIPine   Tablet 7.5 milliGRAM(s) Oral daily  chlorhexidine 2% Cloths 1 Application(s) Topical <User Schedule>  chlorhexidine 4% Liquid 1 Application(s) Topical <User Schedule>  dextrose 10% + sodium chloride 0.9%. 1000 milliLiter(s) (125 mL/Hr) IV Continuous <Continuous>  dextrose 20%. 500 milliLiter(s) (50 mL/Hr) IV Continuous <Continuous>  diazoxide Suspension 300 milliGRAM(s) Oral every 8 hours  enoxaparin Injectable 40 milliGRAM(s) SubCutaneous every 24 hours  fluticasone propionate 50 MICROgram(s)/spray Nasal Spray 1 Spray(s) Both Nostrils two times a day  octreotide  Injectable 300 MICROGram(s) IV Push every 8 hours  polyethylene glycol 3350 17 Gram(s) Oral daily    MEDICATIONS  (PRN):  acetaminophen     Tablet .. 650 milliGRAM(s) Oral every 6 hours PRN Mild Pain (1 - 3)  sodium chloride 0.65% Nasal 1 Spray(s) Both Nostrils three times a day PRN Nasal Congestion  sodium chloride 0.9% lock flush 10 milliLiter(s) IV Push every 1 hour PRN Pre/post blood products, medications, blood draw, and to maintain line patency      Allergies    No Known Allergies    Intolerances        ROS: All other systems reviewed and negative    PHYSICAL EXAM:  VITALS: T(C): 36.8 (01-08-25 @ 12:00)  T(F): 98.2 (01-08-25 @ 12:00), Max: 98.3 (01-08-25 @ 04:00)  HR: 97 (01-08-25 @ 16:00) (80 - 103)  BP: 130/60 (01-08-25 @ 16:00) (108/63 - 149/67)  RR:  (13 - 35)  SpO2:  (92% - 100%)  Wt(kg): 133.8 kg  GENERAL: NAD, resting comfortably, obese   EYES: No proptosis,  anicteric  HEENT:  Atraumatic, Normocephalic, moist mucous membranes  RESPIRATORY: Nonlabored respirations on room air, normal rate/effort   CARDIOVASCULAR: Regular rate and rhythm; no heave  GI: Soft, nontender, non distended  NEURO: Alert and oriented, moves all extremities spontaneously  PSYCH:  reactive affect, euthymic mood    POCT Blood Glucose.: 85 mg/dL (01-08-25 @ 12:14)  POCT Blood Glucose.: 91 mg/dL (01-08-25 @ 05:59)  POCT Blood Glucose.: 103 mg/dL (01-07-25 @ 23:15)  POCT Blood Glucose.: 108 mg/dL (01-07-25 @ 17:00)  POCT Blood Glucose.: 105 mg/dL (01-07-25 @ 14:52)  POCT Blood Glucose.: 83 mg/dL (01-07-25 @ 11:10)  POCT Blood Glucose.: 87 mg/dL (01-07-25 @ 09:09)  POCT Blood Glucose.: 85 mg/dL (01-07-25 @ 06:57)  POCT Blood Glucose.: 78 mg/dL (01-07-25 @ 05:55)  POCT Blood Glucose.: 85 mg/dL (01-07-25 @ 04:06)  POCT Blood Glucose.: 89 mg/dL (01-07-25 @ 02:04)  POCT Blood Glucose.: 86 mg/dL (01-07-25 @ 00:27)  POCT Blood Glucose.: 76 mg/dL (01-06-25 @ 23:06)  POCT Blood Glucose.: 88 mg/dL (01-06-25 @ 20:54)  POCT Blood Glucose.: 98 mg/dL (01-06-25 @ 18:44)  POCT Blood Glucose.: 105 mg/dL (01-06-25 @ 16:42)  POCT Blood Glucose.: 87 mg/dL (01-06-25 @ 14:53)  POCT Blood Glucose.: 69 mg/dL (01-06-25 @ 13:55)  POCT Blood Glucose.: 117 mg/dL (01-06-25 @ 12:50)  POCT Blood Glucose.: 69 mg/dL (01-06-25 @ 12:18)  POCT Blood Glucose.: 96 mg/dL (01-06-25 @ 10:00)  POCT Blood Glucose.: 94 mg/dL (01-06-25 @ 09:08)  POCT Blood Glucose.: 84 mg/dL (01-06-25 @ 08:12)  POCT Blood Glucose.: 121 mg/dL (01-06-25 @ 07:07)  POCT Blood Glucose.: 141 mg/dL (01-06-25 @ 06:43)  POCT Blood Glucose.: 82 mg/dL (01-06-25 @ 06:09)  POCT Blood Glucose.: 80 mg/dL (01-06-25 @ 05:20)  POCT Blood Glucose.: 90 mg/dL (01-06-25 @ 04:10)  POCT Blood Glucose.: 114 mg/dL (01-06-25 @ 03:13)  POCT Blood Glucose.: 107 mg/dL (01-06-25 @ 02:09)  POCT Blood Glucose.: 132 mg/dL (01-06-25 @ 01:05)  POCT Blood Glucose.: 132 mg/dL (01-06-25 @ 00:02)  POCT Blood Glucose.: 147 mg/dL (01-05-25 @ 23:04)  POCT Blood Glucose.: 138 mg/dL (01-05-25 @ 22:08)  POCT Blood Glucose.: 140 mg/dL (01-05-25 @ 21:37)  POCT Blood Glucose.: 128 mg/dL (01-05-25 @ 20:52)  POCT Blood Glucose.: 63 mg/dL (01-05-25 @ 20:25)  POCT Blood Glucose.: 65 mg/dL (01-05-25 @ 20:02)  POCT Blood Glucose.: 80 mg/dL (01-05-25 @ 19:02)  POCT Blood Glucose.: 107 mg/dL (01-05-25 @ 18:01)  POCT Blood Glucose.: 103 mg/dL (01-05-25 @ 17:07)      01-08    142  |  104  |  6[L]  ----------------------------<  114[H]  3.7   |  26  |  0.77    eGFR: 114    Ca    8.2[L]      01-08  Mg     2.2     01-08  Phos  2.4     01-08    TPro  6.9  /  Alb  3.6  /  TBili  0.3  /  DBili  x   /  AST  10  /  ALT  13  /  AlkPhos  64  01-08      A1C with Estimated Average Glucose Result: 4.9 % (01-03-25 @ 06:20)      Thyroid Stimulating Hormone, Serum: 3.50 uIU/mL (01-03-25 @ 04:55)  Thyroid Stimulating Hormone, Serum: 1.60 uIU/mL (01-02-25 @ 15:54)

## 2025-01-08 NOTE — PROGRESS NOTE ADULT - SUBJECTIVE AND OBJECTIVE BOX
Surgery Daily Progress Note    24 Hour/ Interval Events:  - No acute overnight events.     Subjective and Interval:  Seen and examined at bedside. Afebrile.   ___________________________________________________  Vital Signs  T(C): 36.7 (00:00), Max: 36.9 (04:00)  HR: 84 (02:00) (80 - 96)  BP: 119/58 (02:00) (101/57 - 155/74)  ABP: --  ABP(mean): --  RR: 16 (02:00) (12 - 32)  SpO2: 94% (02:00) (91% - 100%)    Physical Exam  General: WN/WD NAD  Neurology: A&Ox3, nonfocal, follows commands  Respiratory: CTA B/L  CV: Normal rate regular rhythm  Abdominal: Soft, NT, ND  Extremities: No edema    In&Out    01-06-25 @ 07:01  -  01-07-25 @ 07:00  --------------------------------------------------------  IN: 4808.4 mL / OUT: 3080 mL / NET: 1728.4 mL    01-07-25 @ 07:01  - 01-08-25 @ 02:49  --------------------------------------------------------  IN: 3400 mL / OUT: 1670 mL / NET: 1730 mL        Labs    LABS:  cret                        11.5   6.68  )-----------( 315      ( 08 Jan 2025 00:33 )             39.4     01-08    140  |  105  |  6[L]  ----------------------------<  80  3.4[L]   |  25  |  0.83    Ca    8.6      08 Jan 2025 00:32  Phos  2.9     01-08  Mg     1.8     01-08    TPro  6.4  /  Alb  3.4  /  TBili  0.3  /  DBili  x   /  AST  9[L]  /  ALT  14  /  AlkPhos  60  01-08      Medications  acetaminophen     Tablet .. 650 milliGRAM(s) Oral every 6 hours PRN  amLODIPine   Tablet 7.5 milliGRAM(s) Oral daily  chlorhexidine 2% Cloths 1 Application(s) Topical <User Schedule>  chlorhexidine 4% Liquid 1 Application(s) Topical <User Schedule>  dextrose 10% + sodium chloride 0.9%. 1000 milliLiter(s) IV Continuous <Continuous>  dextrose 20%. 500 milliLiter(s) IV Continuous <Continuous>  diazoxide Suspension 300 milliGRAM(s) Oral every 8 hours  enoxaparin Injectable 40 milliGRAM(s) SubCutaneous every 24 hours  octreotide  Injectable 300 MICROGram(s) IV Push every 8 hours  polyethylene glycol 3350 17 Gram(s) Oral daily  potassium chloride    Tablet ER 40 milliEquivalent(s) Oral once  sodium chloride 0.65% Nasal 1 Spray(s) Both Nostrils three times a day PRN  sodium chloride 0.9% lock flush 10 milliLiter(s) IV Push every 1 hour PRN      ___________________________________________________  Assessment & Plan      44 M with pmhx of HTN who initially presented after experiencing generalized weakness and confusion who was admitted to MICU for significant and persistent hypoglycemia. CT showing 4x3cm lesion in tail of pancreas and 2x2cm lesion in body. Patient with persistent hypoglycemia despite D10 administration. Patient also with elevated c-peptide of 5.6. 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 Afebrile.   ___________________________________________________  Vital Signs  T(C): 36.7 (00:00), Max: 36.9 (04:00)  HR: 84 (02:00) (80 - 96)  BP: 119/58 (02:00) (101/57 - 155/74)  ABP: --  ABP(mean): --  RR: 16 (02:00) (12 - 32)  SpO2: 94% (02:00) (91% - 100%)    Physical Exam  General: WN/WD NAD  Neurology: A&Ox3, nonfocal, follows commands  Respiratory: CTA B/L  CV: Normal rate regular rhythm  Abdominal: Soft, NT, ND  Extremities: No edema    In&Out    01-06-25 @ 07:01  -  01-07-25 @ 07:00  --------------------------------------------------------  IN: 4808.4 mL / OUT: 3080 mL / NET: 1728.4 mL    01-07-25 @ 07:01  -  01-08-25 @ 02:49  --------------------------------------------------------  IN: 3400 mL / OUT: 1670 mL / NET: 1730 mL    Labs    LABS:  cret                        11.5   6.68  )-----------( 315      ( 08 Jan 2025 00:33 )             39.4     01-08    140  |  105  |  6[L]  ----------------------------<  80  3.4[L]   |  25  |  0.83    Ca    8.6      08 Jan 2025 00:32  Phos  2.9     01-08  Mg     1.8     01-08    TPro  6.4  /  Alb  3.4  /  TBili  0.3  /  DBili  x   /  AST  9[L]  /  ALT  14  /  AlkPhos  60  01-08    Medications  acetaminophen     Tablet .. 650 milliGRAM(s) Oral every 6 hours PRN  amLODIPine   Tablet 7.5 milliGRAM(s) Oral daily  chlorhexidine 2% Cloths 1 Application(s) Topical <User Schedule>  chlorhexidine 4% Liquid 1 Application(s) Topical <User Schedule>  dextrose 10% + sodium chloride 0.9%. 1000 milliLiter(s) IV Continuous <Continuous>  dextrose 20%. 500 milliLiter(s) IV Continuous <Continuous>  diazoxide Suspension 300 milliGRAM(s) Oral every 8 hours  enoxaparin Injectable 40 milliGRAM(s) SubCutaneous every 24 hours  octreotide  Injectable 300 MICROGram(s) IV Push every 8 hours  polyethylene glycol 3350 17 Gram(s) Oral daily  potassium chloride    Tablet ER 40 milliEquivalent(s) Oral once  sodium chloride 0.65% Nasal 1 Spray(s) Both Nostrils three times a day PRN  sodium chloride 0.9% lock flush 10 milliLiter(s) IV Push every 1 hour PRN  ___________________________________________________  Assessment & Plan  44 M PMH HTN who initially presented after experiencing generalized weakness and confusion who was admitted to MICU for significant and persistent hypoglycemia. CT showing 4x3cm lesion in tail of pancreas and 2x2cm lesion in body. Patient with persistent hypoglycemia despite D10 administration. Patient also with elevated c-peptide of 5.6. 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 with Dr. Woods  - Appreciate EUS results and will f/u tissue path  - Patient should be transferred to [facility] 01/09/25 in preparation for distal pancreatectomy 01/10 at [facility] with Dr. Chuck Chiu Surgery, [id number]            Surgery Daily Progress Note    24 Hour/ Interval Events:  - No acute overnight events, still on D20    Subjective and Interval:  Seen and examined at bedside. Afebrile.   ___________________________________________________  Vital Signs  T(C): 36.7 (00:00), Max: 36.9 (04:00)  HR: 84 (02:00) (80 - 96)  BP: 119/58 (02:00) (101/57 - 155/74)  ABP: --  ABP(mean): --  RR: 16 (02:00) (12 - 32)  SpO2: 94% (02:00) (91% - 100%)    Physical Exam  General: WN/WD NAD  Neurology: A&Ox3, nonfocal, follows commands  Respiratory: CTA B/L  CV: Normal rate regular rhythm  Abdominal: Soft, NT, ND  Extremities: No edema    In&Out    01-06-25 @ 07:01  -  01-07-25 @ 07:00  --------------------------------------------------------  IN: 4808.4 mL / OUT: 3080 mL / NET: 1728.4 mL    01-07-25 @ 07:01  -  01-08-25 @ 02:49  --------------------------------------------------------  IN: 3400 mL / OUT: 1670 mL / NET: 1730 mL    Labs    LABS:  cret                        11.5   6.68  )-----------( 315      ( 08 Jan 2025 00:33 )             39.4     01-08    140  |  105  |  6[L]  ----------------------------<  80  3.4[L]   |  25  |  0.83    Ca    8.6      08 Jan 2025 00:32  Phos  2.9     01-08  Mg     1.8     01-08    TPro  6.4  /  Alb  3.4  /  TBili  0.3  /  DBili  x   /  AST  9[L]  /  ALT  14  /  AlkPhos  60  01-08    Medications  acetaminophen     Tablet .. 650 milliGRAM(s) Oral every 6 hours PRN  amLODIPine   Tablet 7.5 milliGRAM(s) Oral daily  chlorhexidine 2% Cloths 1 Application(s) Topical <User Schedule>  chlorhexidine 4% Liquid 1 Application(s) Topical <User Schedule>  dextrose 10% + sodium chloride 0.9%. 1000 milliLiter(s) IV Continuous <Continuous>  dextrose 20%. 500 milliLiter(s) IV Continuous <Continuous>  diazoxide Suspension 300 milliGRAM(s) Oral every 8 hours  enoxaparin Injectable 40 milliGRAM(s) SubCutaneous every 24 hours  octreotide  Injectable 300 MICROGram(s) IV Push every 8 hours  polyethylene glycol 3350 17 Gram(s) Oral daily  potassium chloride    Tablet ER 40 milliEquivalent(s) Oral once  sodium chloride 0.65% Nasal 1 Spray(s) Both Nostrils three times a day PRN  sodium chloride 0.9% lock flush 10 milliLiter(s) IV Push every 1 hour PRN  ___________________________________________________  Assessment & Plan  44 M PMH HTN who initially presented after experiencing generalized weakness and confusion who was admitted to MICU for significant and persistent hypoglycemia. CT showing 4x3cm lesion in tail of pancreas and 2x2cm lesion in body. Patient with persistent hypoglycemia despite D10 administration. Patient also with elevated c-peptide of 5.6. 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 Woods  - Appreciate EUS results and will f/u tissue path  - Patient should be transferred to [facility] in preparation for distal pancreatectomy 01/10 at [facility] with Dr. Chuck Chiu Surgery, [id number]

## 2025-01-08 NOTE — PROGRESS NOTE ADULT - ASSESSMENT
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 Advanced GI consulted for EUS/FNA of pancreatic mass seen on imaging.     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 FNA obtained for cytology/CEA/Interpace. 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    Recommendations:   - advanced diet as tolerated   - pending transfer to [facility], pending SICU bed   - Follow up surgical oncology plan for PNET resection.  - no further management per GI     No further recommendations from Gastroenterology at this point. Gastroenterology will sign off at this time. Please re-consult for any other further questions.     All recommendations are tentative until note is attested by attending.     Padma Dickson, PGY-6  Gastroenterology/Hepatology Fellow  Available on [employer] Teams  [phone] ([facility] Short Range Pager)  [phone] ([facility] Long Range Pager)    On Weekends/Holidays (All day) and Weekdays after 5 PM to 8AM:  For non-urgent consults, please email [email or web address]  For emergent consults, please contact on call GI team.  See Amion schedule ([facility]), [employer] paging system ([facility]), or call hospital  ([facility])

## 2025-01-08 NOTE — PROGRESS NOTE ADULT - ASSESSMENT
44 M with pmhx of HTN who initially presented after experiencing generalized weakness and confusion who was admitted to MICU for significant and persistent hypoglycemia    Neuro:  AOX 3, mental status at baseline. Initially lethargic 2/2 to hypoglycemia   CTH showed no acute intracranial hemorrhage, mass effect, or midline shift.  - CTM    Cardiovascular  # Hypertension  - at home on amlodipine 7.5mg daily   - can continue for now     Respiratory  # non-labored breathing, satting 95% on room air   - no active issues     GI/Nutrition  # Diarrhea   RESOLVED   - had 3-4 days of watery diarrhea in [place]  - other close contacts had similar symptoms, has resolved  - likely viral gastroenteritis, less likely VIPoma given self resolving nature of diarrhea and close contacts with similar symptoms   - can consider GI PCR if diarrhea recurs     #Nutrition  - DASH, TLC    /Renal  - no active issues     ID  - patient afebrile, no leukocytosis   - f/u urine cultures     Endocrine  #Hypoglycemia  #Insulinoma   CT abdomen and pelvis showing lesions in pancreatic tail along with persistent hypoglycemia c/f Insulinoma  Surg onc consulted   s/p central line d20 started  - c/w d20 + d10/NS  - Diazoxide 300mg q8h per endo   - Q6h FS  - Octreotide to 300mg qd + every 6 hours prn  - f/u hypoglycemic labs   - endocrine following, recs appreciated      Hematology   #LUE swelling  - LUE duplex to r/o DVT   - CTM     #DVT ppx   - Lovenox     Oncology  #pancreatic tail mass  #insulinoma   CT Abdomen and pelvis showing Lobulated 4.3 cm enhancing pancreatic tail mass, suspicious for a neuroendocrine tumor given the patient's clinical history. Additional 2.1 cm indeterminate hypoattenuating lesion in the pancreatic body.   MR abdomen and MRCP ordered for further Consider contrast enhanced MRI of the abdomen/MRCP for further evaluation.  CT chest w/ IV con w/o mets w/ thyroid nodule   MR read w/ possible IPMN in pancreatic body and possible neuroendocrine tumor in pancreatic tail   s/p EGD showing hypoechoic pancreatic tail and likely serous cystadenoma in pancreatic body   - Surg onc consulted, per note possibly 1/10 procedure at [facility]   - f/u thyroid U/S   - f/u gastrin, vasoactive intestinal peptide, chromogranin A, and c-peptide levels  44 M with pmhx of HTN who initially presented after experiencing generalized weakness and confusion who was admitted to MICU for significant and persistent hypoglycemia    Neuro:  AOX 3, mental status at baseline. Initially lethargic 2/2 to hypoglycemia   CTH showed no acute intracranial hemorrhage, mass effect, or midline shift.  - CTM    Cardiovascular  # Hypertension  - at home on amlodipine 7.5mg daily   - can continue for now     Respiratory  # non-labored breathing, satting 95% on room air   - no active issues     GI/Nutrition  # Diarrhea   RESOLVED   - had 3-4 days of watery diarrhea in [place]  - other close contacts had similar symptoms, has resolved  - likely viral gastroenteritis, less likely VIPoma given self resolving nature of diarrhea and close contacts with similar symptoms   - can consider GI PCR if diarrhea recurs     #Nutrition  - DASH, TLC    /Renal  - no active issues     ID  - patient afebrile, no leukocytosis   - f/u urine cultures     Endocrine  #Hypoglycemia  #Insulinoma   CT abdomen and pelvis showing lesions in pancreatic tail along with persistent hypoglycemia c/f Insulinoma  Surg onc consulted   s/p central line d20 started  - c/w d20 + d10/NS  - Diazoxide 300mg q8h per endo   - Q6h FS  - Octreotide to 300mg qd + every 6 hours prn  - f/u hypoglycemic labs   - endocrine following, recs appreciated      Hematology   #LUE swelling  - LUE duplex to r/o DVT   - CTM     #DVT ppx   - Lovenox     Oncology  #pancreatic tail mass  #insulinoma   CT Abdomen and pelvis showing Lobulated 4.3 cm enhancing pancreatic tail mass, suspicious for a neuroendocrine tumor given the patient's clinical history. Additional 2.1 cm indeterminate hypoattenuating lesion in the pancreatic body.   MR abdomen and MRCP ordered for further Consider contrast enhanced MRI of the abdomen/MRCP for further evaluation.  CT chest w/ IV con w/o mets w/ thyroid nodule   MR read w/ possible IPMN in pancreatic body and possible neuroendocrine tumor in pancreatic tail   s/p EGD showing hypoechoic pancreatic tail and likely serous cystadenoma in pancreatic body   US thyroid showing possible thyroiditis, and solid hypoechoic nodule TI RADS 4   - Surg onc consulted, per note possibly 1/10 procedure at [facility]   - f/u insulin antibodies, VIP w/u

## 2025-01-08 NOTE — PROGRESS NOTE ADULT - SUBJECTIVE AND OBJECTIVE BOX
MICU Progress Note    SUBJECTIVE  INTERVAL EVENTS:  - s/p EGD  - preparing for transfer to [facility] today     OBJECTIVE  Physical Exam:   PHYSICAL EXAM:  GENERAL: No acute distress, well-developed  HEAD:  Atraumatic, Normocephalic  EYES: EOMI, PERRLA, conjunctiva and sclera clear  NECK: Supple, no lymphadenopathy, no JVD  CHEST/LUNG: CTAB; No wheezes, rales, or rhonchi  HEART: Regular rate and rhythm. Normal S1/S2. No murmurs, rubs, or gallops  ABDOMEN: Soft, non-tender, non-distended; normal bowel sounds, no organomegaly  EXTREMITIES:  2+ peripheral pulses b/l, No clubbing, cyanosis, or edema  NEUROLOGY: A&O x 3, no focal deficits  SKIN: No rashes or lesions    ICU Vital Signs Last 24 Hrs  T(F): 98.3 (08 Jan 2025 04:00), Max: 98.3 (07 Jan 2025 08:00)  HR: 83 (08 Jan 2025 06:00) (80 - 103)  BP: 131/58 (08 Jan 2025 06:00) (101/57 - 150/75)  BP(mean): 81 (08 Jan 2025 06:00) (77 - 104)  ABP: --  ABP(mean): --  RR: 13 (08 Jan 2025 06:00) (12 - 35)  SpO2: 96% (08 Jan 2025 06:00) (92% - 100%)    I&O's Summary    07 Jan 2025 07:01  -  08 Jan 2025 07:00  --------------------------------------------------------  IN: 4565 mL / OUT: 2270 mL / NET: 2295 mL        ECMO Day#     Adult Advanced Hemodynamics Last 24 Hrs  CVP(mm Hg): --  CVP(cm H2O): --  CO: --  CI: --  PA: --  PA(mean): --  PCWP: --  SVR: --  SVRI: --  PVR: --  PVRI: --    ECMO SETTINGS:  Type:		[ ] Venovenous		[ ] Venoarterial  Cannulation Site(s):     Flow:  	        RPM: 		    Oxygenator:	Sweep:     L/min	FiO2:        LABS:                        11.5   6.68  )-----------( 315      ( 08 Jan 2025 00:33 )             39.4     01-08    140  |  105  |  6[L]  ----------------------------<  80  3.4[L]   |  25  |  0.83    Ca    8.6      08 Jan 2025 00:32  Phos  2.9     01-08  Mg     1.8     01-08    TPro  6.4  /  Alb  3.4  /  TBili  0.3  /  DBili  x   /  AST  9[L]  /  ALT  14  /  AlkPhos  60  01-08            Urinalysis Basic - ( 08 Jan 2025 00:32 )    Color: x / Appearance: x / SG: x / pH: x  Gluc: 80 mg/dL / Ketone: x  / Bili: x / Urobili: x   Blood: x / Protein: x / Nitrite: x   Leuk Esterase: x / RBC: x / WBC x   Sq Epi: x / Non Sq Epi: x / Bacteria: x          CAPILLARY BLOOD GLUCOSE      POCT Blood Glucose.: 91 mg/dL (08 Jan 2025 05:59)  POCT Blood Glucose.: 103 mg/dL (07 Jan 2025 23:15)  POCT Blood Glucose.: 108 mg/dL (07 Jan 2025 17:00)  POCT Blood Glucose.: 105 mg/dL (07 Jan 2025 14:52)  POCT Blood Glucose.: 83 mg/dL (07 Jan 2025 11:10)  POCT Blood Glucose.: 87 mg/dL (07 Jan 2025 09:09)        RADIOLOGY & ADDITIONAL TESTS:  Other Labs  Imaging Personally Reviewed: No interval imaging  Electrocardiogram Personally Reviewed: No interval imaging    Medications and Allergies:   MEDICATIONS  (STANDING):  amLODIPine   Tablet 7.5 milliGRAM(s) Oral daily  chlorhexidine 2% Cloths 1 Application(s) Topical <User Schedule>  chlorhexidine 4% Liquid 1 Application(s) Topical <User Schedule>  dextrose 10% + sodium chloride 0.9%. 1000 milliLiter(s) (125 mL/Hr) IV Continuous <Continuous>  dextrose 20%. 500 milliLiter(s) (50 mL/Hr) IV Continuous <Continuous>  diazoxide Suspension 300 milliGRAM(s) Oral every 8 hours  enoxaparin Injectable 40 milliGRAM(s) SubCutaneous every 24 hours  octreotide  Injectable 300 MICROGram(s) IV Push every 8 hours  polyethylene glycol 3350 17 Gram(s) Oral daily    MEDICATIONS  (PRN):  acetaminophen     Tablet .. 650 milliGRAM(s) Oral every 6 hours PRN Mild Pain (1 - 3)  sodium chloride 0.65% Nasal 1 Spray(s) Both Nostrils three times a day PRN Nasal Congestion  sodium chloride 0.9% lock flush 10 milliLiter(s) IV Push every 1 hour PRN Pre/post blood products, medications, blood draw, and to maintain line patency      Antimicrobials            Allergies    No Known Allergies    Intolerances

## 2025-01-09 LAB
ALBUMIN SERPL ELPH-MCNC: 3.3 G/DL — SIGNIFICANT CHANGE UP (ref 3.3–5)
ALBUMIN SERPL ELPH-MCNC: 3.5 G/DL — SIGNIFICANT CHANGE UP (ref 3.3–5)
ALP SERPL-CCNC: 62 U/L — SIGNIFICANT CHANGE UP (ref 40–120)
ALP SERPL-CCNC: 70 U/L — SIGNIFICANT CHANGE UP (ref 40–120)
ALT FLD-CCNC: 13 U/L — SIGNIFICANT CHANGE UP (ref 10–45)
ALT FLD-CCNC: 13 U/L — SIGNIFICANT CHANGE UP (ref 10–45)
ANION GAP SERPL CALC-SCNC: 10 MMOL/L — SIGNIFICANT CHANGE UP (ref 5–17)
ANION GAP SERPL CALC-SCNC: 11 MMOL/L — SIGNIFICANT CHANGE UP (ref 5–17)
APTT BLD: 32.5 SEC — SIGNIFICANT CHANGE UP (ref 24.5–35.6)
AST SERPL-CCNC: 10 U/L — SIGNIFICANT CHANGE UP (ref 10–40)
AST SERPL-CCNC: 11 U/L — SIGNIFICANT CHANGE UP (ref 10–40)
BASOPHILS # BLD AUTO: 0.03 K/UL — SIGNIFICANT CHANGE UP (ref 0–0.2)
BASOPHILS NFR BLD AUTO: 0.5 % — SIGNIFICANT CHANGE UP (ref 0–2)
BILIRUB SERPL-MCNC: 0.2 MG/DL — SIGNIFICANT CHANGE UP (ref 0.2–1.2)
BILIRUB SERPL-MCNC: 0.4 MG/DL — SIGNIFICANT CHANGE UP (ref 0.2–1.2)
BUN SERPL-MCNC: 10 MG/DL — SIGNIFICANT CHANGE UP (ref 7–23)
BUN SERPL-MCNC: 8 MG/DL — SIGNIFICANT CHANGE UP (ref 7–23)
CALCIUM SERPL-MCNC: 8 MG/DL — LOW (ref 8.4–10.5)
CALCIUM SERPL-MCNC: 8.3 MG/DL — LOW (ref 8.4–10.5)
CALCIUM SERPL-MCNC: 8.4 MG/DL — SIGNIFICANT CHANGE UP (ref 8.4–10.5)
CHLORIDE SERPL-SCNC: 104 MMOL/L — SIGNIFICANT CHANGE UP (ref 96–108)
CHLORIDE SERPL-SCNC: 106 MMOL/L — SIGNIFICANT CHANGE UP (ref 96–108)
CHLORPROPAMIDE RESULT: SIGNIFICANT CHANGE UP MCG/ML
CO2 SERPL-SCNC: 23 MMOL/L — SIGNIFICANT CHANGE UP (ref 22–31)
CO2 SERPL-SCNC: 23 MMOL/L — SIGNIFICANT CHANGE UP (ref 22–31)
CREAT SERPL-MCNC: 0.81 MG/DL — SIGNIFICANT CHANGE UP (ref 0.5–1.3)
CREAT SERPL-MCNC: 0.94 MG/DL — SIGNIFICANT CHANGE UP (ref 0.5–1.3)
EGFR: 103 ML/MIN/1.73M2 — SIGNIFICANT CHANGE UP
EGFR: 112 ML/MIN/1.73M2 — SIGNIFICANT CHANGE UP
EOSINOPHIL # BLD AUTO: 0.31 K/UL — SIGNIFICANT CHANGE UP (ref 0–0.5)
EOSINOPHIL NFR BLD AUTO: 5.2 % — SIGNIFICANT CHANGE UP (ref 0–6)
GLIMEPIRIDE RESULT: SIGNIFICANT CHANGE UP NG/ML
GLIPIZIDE RESULT: SIGNIFICANT CHANGE UP NG/ML
GLUCOSE BLDC GLUCOMTR-MCNC: 106 MG/DL — HIGH (ref 70–99)
GLUCOSE BLDC GLUCOMTR-MCNC: 107 MG/DL — HIGH (ref 70–99)
GLUCOSE BLDC GLUCOMTR-MCNC: 118 MG/DL — HIGH (ref 70–99)
GLUCOSE BLDC GLUCOMTR-MCNC: 137 MG/DL — HIGH (ref 70–99)
GLUCOSE BLDC GLUCOMTR-MCNC: 160 MG/DL — HIGH (ref 70–99)
GLUCOSE SERPL-MCNC: 124 MG/DL — HIGH (ref 70–99)
GLUCOSE SERPL-MCNC: 215 MG/DL — HIGH (ref 70–99)
GLYBURIDE RESULT: SIGNIFICANT CHANGE UP NG/ML
HCT VFR BLD CALC: 36.1 % — LOW (ref 39–50)
HGB BLD-MCNC: 10.6 G/DL — LOW (ref 13–17)
IMM GRANULOCYTES NFR BLD AUTO: 0.2 % — SIGNIFICANT CHANGE UP (ref 0–0.9)
INR BLD: 1.11 RATIO — SIGNIFICANT CHANGE UP (ref 0.85–1.16)
LYMPHOCYTES # BLD AUTO: 1.42 K/UL — SIGNIFICANT CHANGE UP (ref 1–3.3)
LYMPHOCYTES # BLD AUTO: 24 % — SIGNIFICANT CHANGE UP (ref 13–44)
MAGNESIUM SERPL-MCNC: 1.8 MG/DL — SIGNIFICANT CHANGE UP (ref 1.6–2.6)
MAGNESIUM SERPL-MCNC: 2 MG/DL — SIGNIFICANT CHANGE UP (ref 1.6–2.6)
MCHC RBC-ENTMCNC: 20.3 PG — LOW (ref 27–34)
MCHC RBC-ENTMCNC: 29.4 G/DL — LOW (ref 32–36)
MCV RBC AUTO: 69.2 FL — LOW (ref 80–100)
MONOCYTES # BLD AUTO: 0.43 K/UL — SIGNIFICANT CHANGE UP (ref 0–0.9)
MONOCYTES NFR BLD AUTO: 7.3 % — SIGNIFICANT CHANGE UP (ref 2–14)
NATEGLINIDE RESULT: SIGNIFICANT CHANGE UP MCG/ML
NEUTROPHILS # BLD AUTO: 3.71 K/UL — SIGNIFICANT CHANGE UP (ref 1.8–7.4)
NEUTROPHILS NFR BLD AUTO: 62.8 % — SIGNIFICANT CHANGE UP (ref 43–77)
NRBC # BLD: 0 /100 WBCS — SIGNIFICANT CHANGE UP (ref 0–0)
PHOSPHATE SERPL-MCNC: 2.2 MG/DL — LOW (ref 2.5–4.5)
PHOSPHATE SERPL-MCNC: 2.8 MG/DL — SIGNIFICANT CHANGE UP (ref 2.5–4.5)
PIOGLITAZONE RESULT: SIGNIFICANT CHANGE UP NG/ML
PLATELET # BLD AUTO: 279 K/UL — SIGNIFICANT CHANGE UP (ref 150–400)
POTASSIUM SERPL-MCNC: 3.3 MMOL/L — LOW (ref 3.5–5.3)
POTASSIUM SERPL-MCNC: 3.8 MMOL/L — SIGNIFICANT CHANGE UP (ref 3.5–5.3)
POTASSIUM SERPL-SCNC: 3.3 MMOL/L — LOW (ref 3.5–5.3)
POTASSIUM SERPL-SCNC: 3.8 MMOL/L — SIGNIFICANT CHANGE UP (ref 3.5–5.3)
PROT SERPL-MCNC: 6.2 G/DL — SIGNIFICANT CHANGE UP (ref 6–8.3)
PROT SERPL-MCNC: 6.7 G/DL — SIGNIFICANT CHANGE UP (ref 6–8.3)
PROTHROM AB SERPL-ACNC: 12.6 SEC — SIGNIFICANT CHANGE UP (ref 9.9–13.4)
PTH-INTACT FLD-MCNC: 199 PG/ML — HIGH (ref 15–65)
RBC # BLD: 5.22 M/UL — SIGNIFICANT CHANGE UP (ref 4.2–5.8)
RBC # FLD: 16.5 % — HIGH (ref 10.3–14.5)
REPAGLINIDE RESULT: SIGNIFICANT CHANGE UP NG/ML
ROSIGLITAZONE RESULT: SIGNIFICANT CHANGE UP NG/ML
SODIUM SERPL-SCNC: 138 MMOL/L — SIGNIFICANT CHANGE UP (ref 135–145)
SODIUM SERPL-SCNC: 139 MMOL/L — SIGNIFICANT CHANGE UP (ref 135–145)
TOLAZAMIDE RESULT: SIGNIFICANT CHANGE UP MCG/ML
TOLBUTAMIDE RESULT: SIGNIFICANT CHANGE UP MCG/ML
WBC # BLD: 5.91 K/UL — SIGNIFICANT CHANGE UP (ref 3.8–10.5)
WBC # FLD AUTO: 5.91 K/UL — SIGNIFICANT CHANGE UP (ref 3.8–10.5)

## 2025-01-09 PROCEDURE — 99232 SBSQ HOSP IP/OBS MODERATE 35: CPT | Mod: 57

## 2025-01-09 PROCEDURE — 99232 SBSQ HOSP IP/OBS MODERATE 35: CPT | Mod: GC

## 2025-01-09 PROCEDURE — 99291 CRITICAL CARE FIRST HOUR: CPT | Mod: 57

## 2025-01-09 RX ORDER — POTASSIUM CHLORIDE 600 MG/1
40 TABLET, FILM COATED, EXTENDED RELEASE ORAL ONCE
Refills: 0 | Status: COMPLETED | OUTPATIENT
Start: 2025-01-09 | End: 2025-01-09

## 2025-01-09 RX ORDER — SODIUM CHLORIDE 9 MG/ML
1000 INJECTION, SOLUTION INTRAVENOUS
Refills: 0 | Status: DISCONTINUED | OUTPATIENT
Start: 2025-01-09 | End: 2025-01-10

## 2025-01-09 RX ORDER — SIMETHICONE 125 MG/1
80 CAPSULE, LIQUID FILLED ORAL
Refills: 0 | Status: DISCONTINUED | OUTPATIENT
Start: 2025-01-09 | End: 2025-01-10

## 2025-01-09 RX ADMIN — Medication 300 MILLIGRAM(S): at 14:19

## 2025-01-09 RX ADMIN — OCTREOTIDE ACETATE 300 MICROGRAM(S): KIT INTRAMUSCULAR at 22:16

## 2025-01-09 RX ADMIN — DEXTROSE MONOHYDRATE 50 MILLILITER(S): 100 INJECTION, SOLUTION INTRAVENOUS at 01:00

## 2025-01-09 RX ADMIN — Medication 300 MILLIGRAM(S): at 22:16

## 2025-01-09 RX ADMIN — FLUTICASONE PROPIONATE 1 SPRAY(S): 50 SPRAY, METERED NASAL at 05:40

## 2025-01-09 RX ADMIN — Medication 7.5 MILLIGRAM(S): at 05:43

## 2025-01-09 RX ADMIN — DEXTROSE MONOHYDRATE 50 MILLILITER(S): 100 INJECTION, SOLUTION INTRAVENOUS at 10:00

## 2025-01-09 RX ADMIN — DEXTROSE MONOHYDRATE 50 MILLILITER(S): 100 INJECTION, SOLUTION INTRAVENOUS at 06:00

## 2025-01-09 RX ADMIN — DEXTROSE MONOHYDRATE 50 MILLILITER(S): 100 INJECTION, SOLUTION INTRAVENOUS at 08:00

## 2025-01-09 RX ADMIN — OCTREOTIDE ACETATE 300 MICROGRAM(S): KIT INTRAMUSCULAR at 14:17

## 2025-01-09 RX ADMIN — Medication 300 MILLIGRAM(S): at 05:41

## 2025-01-09 RX ADMIN — ENOXAPARIN SODIUM 40 MILLIGRAM(S): 60 INJECTION INTRAVENOUS; SUBCUTANEOUS at 14:17

## 2025-01-09 RX ADMIN — CHLORHEXIDINE GLUCONATE 1 APPLICATION(S): 1.2 RINSE ORAL at 05:42

## 2025-01-09 RX ADMIN — DEXTROSE MONOHYDRATE 50 MILLILITER(S): 100 INJECTION, SOLUTION INTRAVENOUS at 16:00

## 2025-01-09 RX ADMIN — DEXTROSE MONOHYDRATE 50 MILLILITER(S): 100 INJECTION, SOLUTION INTRAVENOUS at 23:00

## 2025-01-09 RX ADMIN — DEXTROSE MONOHYDRATE 50 MILLILITER(S): 100 INJECTION, SOLUTION INTRAVENOUS at 12:46

## 2025-01-09 RX ADMIN — DEXTROSE MONOHYDRATE 50 MILLILITER(S): 100 INJECTION, SOLUTION INTRAVENOUS at 05:00

## 2025-01-09 RX ADMIN — DEXTROSE MONOHYDRATE 50 MILLILITER(S): 100 INJECTION, SOLUTION INTRAVENOUS at 14:00

## 2025-01-09 RX ADMIN — OCTREOTIDE ACETATE 300 MICROGRAM(S): KIT INTRAMUSCULAR at 05:41

## 2025-01-09 RX ADMIN — POTASSIUM CHLORIDE 40 MILLIEQUIVALENT(S): 600 TABLET, FILM COATED, EXTENDED RELEASE ORAL at 01:08

## 2025-01-09 RX ADMIN — CHLORHEXIDINE GLUCONATE 1 APPLICATION(S): 1.2 RINSE ORAL at 05:41

## 2025-01-09 RX ADMIN — DEXTROSE MONOHYDRATE 50 MILLILITER(S): 100 INJECTION, SOLUTION INTRAVENOUS at 02:00

## 2025-01-09 RX ADMIN — DEXTROSE MONOHYDRATE 50 MILLILITER(S): 100 INJECTION, SOLUTION INTRAVENOUS at 03:00

## 2025-01-09 RX ADMIN — SODIUM CHLORIDE 75 MILLILITER(S): 9 INJECTION, SOLUTION INTRAVENOUS at 22:17

## 2025-01-09 RX ADMIN — SIMETHICONE 80 MILLIGRAM(S): 125 CAPSULE, LIQUID FILLED ORAL at 12:58

## 2025-01-09 RX ADMIN — FLUTICASONE PROPIONATE 1 SPRAY(S): 50 SPRAY, METERED NASAL at 17:15

## 2025-01-09 RX ADMIN — DEXTROSE MONOHYDRATE 50 MILLILITER(S): 100 INJECTION, SOLUTION INTRAVENOUS at 22:17

## 2025-01-09 NOTE — PROGRESS NOTE ADULT - ASSESSMENT
Patient is a 44 year old male with past medical history of hypertension who presented to the hospital with hypoglycemia. 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    - Unclear if patient is meeting Whipple's triad at this time as he does not have classic hypoglycemia symptoms and has not been adequately treated to bring his glucose back up.   - CT abd/pel with PANCREAS: Lobulated enhancing mass at the pancreatic tail measuring 4.3 x 3.7 cm, with abutment of the left kidney at the posterior margin. Additional hypoattenuating lesion in the pancreatic body measuring 2.1 x 1.8 cm.  ADRENALS: Nonspecific mildly thickened bilateral adrenal glands.  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 DDx also includes other causes of insulin-mediated hypoglycemia (i.e insulin antibodies), medication  - AM cortisol 12.6- 1/4/25, rules out adrenal insufficiency  Recommendations:  - Continue diazoxide max dose of 900 mg/day (300 mg TID). Titrate down dextrose fluids as permitted  - continue octreotide, increased by primary team to 300 q8h  - continue D20 infusion, titrate as permitted  - continue to check FSG q4h for now  - hypoglycemia protocol  - follow up sulfonylurea panel, insulin antibodies.   - follow up neuroendocrine tumor markers as baseline: chromogranin A, gastrin, VIP level  - Appreciate surg onc consult, agree with GI consult for EUS. Recommend first line treatment of surgical resection for treatment of insulinoma. Per MICU, patient [facility] not [facility]. Patient pending tentative surgery at [facility] tomorrow 1/10.  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  - Check vitamin D 25OH  - Can potentially check 4D CT neck once stabilized    #HTN  Management per primary team, currently on amlodipine 7.5 mg qd    #Thyroid nodule  Incidental finding on CT chest  US performed 1/7/25: Right thyroid nodule: --Nodule 1: - In the medial lower pole/isthmus, measuring 2.0 x 1.0 x 2.1   cm predominantly solid, hypoechoic, TIRADS 4. Moderate vascularity is demonstrated within this nodule with color Doppler.  PLAN:  - FNA indicated, can be done outpatient at office visit or inpatient to expedite care.       Endy Serrato MD  Endocrine Fellow  Can be reached via teams. For follow up questions, discharge recommendations, or new consults, please call answering service at [phone] (weekdays); [phone] (nights/weekends). Patient is a 44 year old male with past medical history of hypertension who presented to the hospital with hypoglycemia. 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    - Unclear if patient is meeting Whipple's triad at this time as he does not have classic hypoglycemia symptoms and has not been adequately treated to bring his glucose back up.   - CT abd/pel with PANCREAS: Lobulated enhancing mass at the pancreatic tail measuring 4.3 x 3.7 cm, with abutment of the left kidney at the posterior margin. Additional hypoattenuating lesion in the pancreatic body measuring 2.1 x 1.8 cm.  ADRENALS: Nonspecific mildly thickened bilateral adrenal glands.  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 DDx also includes other causes of insulin-mediated hypoglycemia (i.e insulin antibodies), medication  - AM cortisol 12.6- 1/4/25, rules out adrenal insufficiency  Recommendations:  - Continue diazoxide max dose of 900 mg/day (300 mg TID). Titrate down dextrose fluids as permitted  - continue octreotide, increased by primary team to 300 q8h  - continue D20 infusion, titrate as permitted  - continue to check FSG q4h for now  - hypoglycemia protocol  - follow up sulfonylurea panel, insulin antibodies.   - follow up neuroendocrine tumor markers as baseline: chromogranin A, gastrin, VIP level  - Appreciate surg onc consult, agree with GI consult for EUS. Recommend first line treatment of surgical resection for treatment of insulinoma. Per MICU, patient will not be transferred to [facility] for surgery. Patient pending tentative surgery at [facility] tomorrow 1/10.  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  -Most likely secondary hyperparathyroidism from low Vit D and borderline low calcium  - Check vitamin D 25OH    #HTN  Management per primary team, currently on amlodipine 7.5 mg qd    #Thyroid nodule  Incidental finding on CT chest  US performed 1/7/25: Right thyroid nodule: --Nodule 1: - In the medial lower pole/isthmus, measuring 2.0 x 1.0 x 2.1   cm predominantly solid, hypoechoic, TIRADS 4. Moderate vascularity is demonstrated within this nodule with color Doppler.  PLAN:  - FNA indicated, can be done outpatient at office visit or inpatient to expedite care.       Endy Serrato MD  Endocrine Fellow  Can be reached via teams. For follow up questions, discharge recommendations, or new consults, please call answering service at [phone] (weekdays); [phone] (nights/weekends).

## 2025-01-09 NOTE — PROGRESS NOTE ADULT - SUBJECTIVE AND OBJECTIVE BOX
Admitted for: Hypoglycemia        Following for:    Subjective:       MEDICATIONS  (STANDING):  amLODIPine   Tablet 7.5 milliGRAM(s) Oral daily  chlorhexidine 2% Cloths 1 Application(s) Topical <User Schedule>  chlorhexidine 4% Liquid 1 Application(s) Topical <User Schedule>  dextrose 10% + sodium chloride 0.9%. 1000 milliLiter(s) (75 mL/Hr) IV Continuous <Continuous>  dextrose 20%. 500 milliLiter(s) (50 mL/Hr) IV Continuous <Continuous>  diazoxide Suspension 300 milliGRAM(s) Oral every 8 hours  enoxaparin Injectable 40 milliGRAM(s) SubCutaneous every 24 hours  fluticasone propionate 50 MICROgram(s)/spray Nasal Spray 1 Spray(s) Both Nostrils two times a day  octreotide  Injectable 300 MICROGram(s) IV Push every 8 hours  polyethylene glycol 3350 17 Gram(s) Oral daily    MEDICATIONS  (PRN):  acetaminophen     Tablet .. 650 milliGRAM(s) Oral every 6 hours PRN Mild Pain (1 - 3)  simethicone 80 milliGRAM(s) Chew four times a day PRN Upset Stomach  sodium chloride 0.65% Nasal 1 Spray(s) Both Nostrils three times a day PRN Nasal Congestion  sodium chloride 0.9% lock flush 10 milliLiter(s) IV Push every 1 hour PRN Pre/post blood products, medications, blood draw, and to maintain line patency      Allergies    No Known Allergies    Intolerances          PHYSICAL EXAM:  VITALS: T(C): 36.8 (01-09-25 @ 16:00)  T(F): 98.3 (01-09-25 @ 16:00), Max: 98.3 (01-09-25 @ 00:00)  HR: 97 (01-09-25 @ 17:00) (90 - 104)  BP: 127/59 (01-09-25 @ 17:00) (115/57 - 170/79)  RR:  (15 - 35)  SpO2:  (69% - 98%)  Wt(kg): --  GENERAL: NAD  EYES: No proptosis, no lid lag, anicteric  RESPIRATORY: Clear to auscultation bilaterally  CARDIOVASCULAR: Regular rate and rhythm  GI: Soft, nontender, non distended  EXT: b/l feet without wounds, 2+ pulses  PSYCH: Alert and oriented x 3, reactive affect      A1C with Estimated Average Glucose Result: 4.9 % (01-03-25 @ 06:20)      POCT Blood Glucose.: 137 mg/dL (01-09-25 @ 17:17)  POCT Blood Glucose.: 118 mg/dL (01-09-25 @ 13:53)  POCT Blood Glucose.: 107 mg/dL (01-09-25 @ 13:48)  POCT Blood Glucose.: 160 mg/dL (01-09-25 @ 13:47)  POCT Blood Glucose.: 106 mg/dL (01-09-25 @ 05:50)  POCT Blood Glucose.: 123 mg/dL (01-08-25 @ 17:28)  POCT Blood Glucose.: 85 mg/dL (01-08-25 @ 12:14)  POCT Blood Glucose.: 91 mg/dL (01-08-25 @ 05:59)  POCT Blood Glucose.: 103 mg/dL (01-07-25 @ 23:15)  POCT Blood Glucose.: 108 mg/dL (01-07-25 @ 17:00)  POCT Blood Glucose.: 105 mg/dL (01-07-25 @ 14:52)  POCT Blood Glucose.: 83 mg/dL (01-07-25 @ 11:10)  POCT Blood Glucose.: 87 mg/dL (01-07-25 @ 09:09)  POCT Blood Glucose.: 85 mg/dL (01-07-25 @ 06:57)  POCT Blood Glucose.: 78 mg/dL (01-07-25 @ 05:55)  POCT Blood Glucose.: 85 mg/dL (01-07-25 @ 04:06)  POCT Blood Glucose.: 89 mg/dL (01-07-25 @ 02:04)  POCT Blood Glucose.: 86 mg/dL (01-07-25 @ 00:27)  POCT Blood Glucose.: 76 mg/dL (01-06-25 @ 23:06)  POCT Blood Glucose.: 88 mg/dL (01-06-25 @ 20:54)  POCT Blood Glucose.: 98 mg/dL (01-06-25 @ 18:44)      01-09    138  |  104  |  8   ----------------------------<  124[H]  3.8   |  23  |  0.81    eGFR: 112    Ca    8.4      01-09  Mg     1.8     01-09  Phos  2.2     01-09    TPro  6.7  /  Alb  3.5  /  TBili  0.4  /  DBili  x   /  AST  11  /  ALT  13  /  AlkPhos  70  01-09      Thyroid Function Tests:  01-03 @ 04:55 TSH 3.50 FreeT4 1.3 T3 -- Anti TPO -- Anti Thyroglobulin Ab -- TSI --  01-02 @ 15:54 TSH 1.60 FreeT4 -- T3 -- Anti TPO -- Anti Thyroglobulin Ab -- TSI --

## 2025-01-09 NOTE — CHART NOTE - NSCHARTNOTEFT_GEN_A_CORE
NUTRITION FOLLOW UP NOTE    PATIENT SEEN FOR: MICU Follow Up     SOURCE: [x] Patient  [x] Current Medical Record  [] RN  [X] Family/support person at bedside  [] Patient unavailable/inappropriate  [] Other:    CHART REVIEWED/EVENTS NOTED.  [X] No changes to nutrition care plan to note  [X] Nutrition Status:  - Hypoglycemia; no episodes of low blood glucose noted since 1/6/25   - Possible transfer to [facility] in preparation for distal pancreatectomy 01/10/25    DIET ORDER:   Diet, DASH/TLC:   Sodium & Cholesterol Restricted (01-02-25)    CURRENT DIET ORDER IS:  [X] Appropriate:   [] Inadequate:  [] Other:    NUTRITION INTAKE/PROVISION:  [X] PO: Pt reports fair to good PO intakes, per flow sheets eating % of meals, ordering meals in [employer] program   [] Enteral Nutrition:  [] Parenteral Nutrition:    ANTHROPOMETRICS:  Drug Dosing Weight  Height (cm): 177.8 (03 Jan 2025 05:49)  Weight (kg): 133.8 (03 Jan 2025 05:49)  BMI (kg/m2): 42.3 (03 Jan 2025 05:49)  BSA (m2): 2.46 (03 Jan 2025 05:49)  Weights:   Daily      NUTRITIONALLY PERTINENTMEDICATIONS  (STANDING):  amLODIPine   Tablet 7.5 milliGRAM(s) Oral daily  dextrose 10% + sodium chloride 0.9%. 1000 milliLiter(s) (125 mL/Hr) IV Continuous <Continuous>  dextrose 20%. 500 milliLiter(s) (50 mL/Hr) IV Continuous <Continuous>  diazoxide Suspension 300 milliGRAM(s) Oral every 8 hours  enoxaparin Injectable 40 milliGRAM(s) SubCutaneous every 24 hours  fluticasone propionate 50 MICROgram(s)/spray Nasal Spray 1 Spray(s) Both Nostrils two times a day  octreotide  Injectable 300 MICROGram(s) IV Push every 8 hours  polyethylene glycol 3350 17 Gram(s) Oral daily    MEDICATIONS  (PRN):  acetaminophen     Tablet .. 650 milliGRAM(s) Oral every 6 hours PRN Mild Pain (1 - 3)  sodium chloride 0.65% Nasal 1 Spray(s) Both Nostrils three times a day PRN Nasal Congestion  sodium chloride 0.9% lock flush 10 milliLiter(s) IV Push every 1 hour PRN Pre/post blood products, medications, blood draw, and to maintain line patency      NUTRITIONALLY PERTINENT LABS:  01-08 Na139 mmol/L Glu 215 mg/dL[H] K+ 3.3 mmol/L[L] Cr  0.94 mg/dL BUN 10 mg/dL 01-08 Phos 2.8 mg/dL 01-08 Alb 3.3 g/dL01-08 ALT 13 U/L AST 10 U/L Alkaline Phosphatase 62 U/L    A1C with Estimated Average Glucose Result: 4.9 % (01-03-25 @ 06:20)      Finger Sticks:  POCT Blood Glucose.: 106 mg/dL (01-09 @ 05:50)  POCT Blood Glucose.: 123 mg/dL (01-08 @ 17:28)  POCT Blood Glucose.: 85 mg/dL (01-08 @ 12:14)      NUTRITIONALLY PERTINENT MEDICATIONS/LABS:  [x] Reviewed  [X] Relevant notes on medications/labs:  - Hypokalemia     EDEMA:  [x] Reviewed  [X] Relevant notes: non-pitting edema to left arm per flow sheets on 1/9/25     GI/ I&O:  [x] Reviewed  [X] Relevant notes:  last bowel movement on 1/7/25, ordered for senna and Miralax for bowel reigmen   [] Other:    SKIN:   [X] No pressure injuries documented, per nursing flowsheet  [] Pressure injury previously noted  [] Change in pressure injury documentation:  [] Other:    ESTIMATED NEEDS:  [] No change:  [X] Updated:  Energy: 7096-2163   kcal/day (25-30 kcal/kg)  Protein:  90.4-105.28 g/day (1.2-1.4 g/kg)  Fluid:   ml/day or [X] defer to team  Based on: IBW of 75.2 kg     NUTRITION DIAGNOSIS:  [X] Prior Dx: 1. Overweight/Obesity 2. Altered Nutrition Related Lab Values   [] New Dx:    EDUCATION:  [] Yes:  [X] Not appropriate/warranted    NUTRITION CARE PLAN:  1. Diet: DASH/TLC   2. Supplements: None at this time   3. Multivitamin/mineral supplementation: None at this time   4: Encourage adequate PO intakes as tolerated. Honor food preferences as appropriate and available.      [] Achieved - Continue current nutrition intervention(s)  [] Current medical condition precludes nutrition intervention at this time.    MONITORING AND EVALUATION:   RD remains available upon request and will follow up per protocol.    Ruth Srinivasan, MS,RDN,CDN,CNSC  Available on [facility] TEAMS

## 2025-01-09 NOTE — PROGRESS NOTE ADULT - SUBJECTIVE AND OBJECTIVE BOX
MICU Progress Note    SUBJECTIVE  INTERVAL EVENTS:  - NAEON    OBJECTIVE  Physical Exam:   PHYSICAL EXAM:  GENERAL: No acute distress, well-developed  HEAD:  Atraumatic, Normocephalic  EYES: EOMI, PERRLA, conjunctiva and sclera clear  NECK: Supple, no lymphadenopathy, no JVD  CHEST/LUNG: CTAB; No wheezes, rales, or rhonchi  HEART: Regular rate and rhythm. Normal S1/S2. No murmurs, rubs, or gallops  ABDOMEN: Soft, non-tender, non-distended; normal bowel sounds, no organomegaly  EXTREMITIES:  2+ peripheral pulses b/l, No clubbing, cyanosis, or edema  NEUROLOGY: A&O x 3, no focal deficits  SKIN: No rashes or lesions    ICU Vital Signs Last 24 Hrs  T(F): 98.2 (09 Jan 2025 04:00), Max: 98.5 (08 Jan 2025 16:00)  HR: 100 (09 Jan 2025 06:00) (81 - 100)  BP: 143/67 (09 Jan 2025 06:00) (109/58 - 147/64)  BP(mean): 96 (09 Jan 2025 06:00) (78 - 106)  ABP: --  ABP(mean): --  RR: 16 (09 Jan 2025 06:00) (13 - 22)  SpO2: 95% (09 Jan 2025 06:00) (94% - 98%)    I&O's Summary    08 Jan 2025 07:01  -  09 Jan 2025 07:00  --------------------------------------------------------  IN: 4820 mL / OUT: 1900 mL / NET: 2920 mL        ECMO Day#     Adult Advanced Hemodynamics Last 24 Hrs  CVP(mm Hg): --  CVP(cm H2O): --  CO: --  CI: --  PA: --  PA(mean): --  PCWP: --  SVR: --  SVRI: --  PVR: --  PVRI: --    ECMO SETTINGS:  Type:		[ ] Venovenous		[ ] Venoarterial  Cannulation Site(s):     Flow:  	        RPM: 		    Oxygenator:	Sweep:     L/min	FiO2:        LABS:                        10.6   5.91  )-----------( 279      ( 08 Jan 2025 23:51 )             36.1     01-08    139  |  106  |  10  ----------------------------<  215[H]  3.3[L]   |  23  |  0.94    Ca    8.3[L]      08 Jan 2025 23:51  Phos  2.8     01-08  Mg     2.0     01-08    TPro  6.2  /  Alb  3.3  /  TBili  0.2  /  DBili  x   /  AST  10  /  ALT  13  /  AlkPhos  62  01-08    PT/INR - ( 08 Jan 2025 23:51 )   PT: 12.6 sec;   INR: 1.11 ratio         PTT - ( 08 Jan 2025 23:51 )  PTT:32.5 sec      Venous: 01-08-25 @ 23:47 FiO2: -- Oxygen Sat% 88.8    Urinalysis Basic - ( 08 Jan 2025 23:51 )    Color: x / Appearance: x / SG: x / pH: x  Gluc: 215 mg/dL / Ketone: x  / Bili: x / Urobili: x   Blood: x / Protein: x / Nitrite: x   Leuk Esterase: x / RBC: x / WBC x   Sq Epi: x / Non Sq Epi: x / Bacteria: x          CAPILLARY BLOOD GLUCOSE      POCT Blood Glucose.: 106 mg/dL (09 Jan 2025 05:50)  POCT Blood Glucose.: 123 mg/dL (08 Jan 2025 17:28)  POCT Blood Glucose.: 85 mg/dL (08 Jan 2025 12:14)        RADIOLOGY & ADDITIONAL TESTS:  Other Labs  Imaging Personally Reviewed: No interval imaging  Electrocardiogram Personally Reviewed: No interval imaging    Medications and Allergies:   MEDICATIONS  (STANDING):  amLODIPine   Tablet 7.5 milliGRAM(s) Oral daily  chlorhexidine 2% Cloths 1 Application(s) Topical <User Schedule>  chlorhexidine 4% Liquid 1 Application(s) Topical <User Schedule>  dextrose 10% + sodium chloride 0.9%. 1000 milliLiter(s) (125 mL/Hr) IV Continuous <Continuous>  dextrose 20%. 500 milliLiter(s) (50 mL/Hr) IV Continuous <Continuous>  diazoxide Suspension 300 milliGRAM(s) Oral every 8 hours  enoxaparin Injectable 40 milliGRAM(s) SubCutaneous every 24 hours  fluticasone propionate 50 MICROgram(s)/spray Nasal Spray 1 Spray(s) Both Nostrils two times a day  octreotide  Injectable 300 MICROGram(s) IV Push every 8 hours  polyethylene glycol 3350 17 Gram(s) Oral daily    MEDICATIONS  (PRN):  acetaminophen     Tablet .. 650 milliGRAM(s) Oral every 6 hours PRN Mild Pain (1 - 3)  sodium chloride 0.65% Nasal 1 Spray(s) Both Nostrils three times a day PRN Nasal Congestion  sodium chloride 0.9% lock flush 10 milliLiter(s) IV Push every 1 hour PRN Pre/post blood products, medications, blood draw, and to maintain line patency      Antimicrobials            Allergies    No Known Allergies    Intolerances

## 2025-01-09 NOTE — PROGRESS NOTE ADULT - ASSESSMENT
44 M with pmhx of HTN who initially presented after experiencing generalized weakness and confusion who was admitted to MICU for significant and persistent hypoglycemia    Neuro:  AOX 3, mental status at baseline. Initially lethargic 2/2 to hypoglycemia   CTH showed no acute intracranial hemorrhage, mass effect, or midline shift.  - CTM    Cardiovascular  # Hypertension  - at home on amlodipine 7.5mg daily   - can continue for now     Respiratory  # non-labored breathing, satting 95% on room air   - no active issues     GI/Nutrition  # Diarrhea   RESOLVED   - had 3-4 days of watery diarrhea in [place]  - other close contacts had similar symptoms, has resolved  - likely viral gastroenteritis, less likely VIPoma given self resolving nature of diarrhea and close contacts with similar symptoms   - can consider GI PCR if diarrhea recurs     #Nutrition  - DASH, TLC    /Renal  - no active issues     ID  - patient afebrile, no leukocytosis   - f/u urine cultures     Endocrine  #Hypoglycemia  #Insulinoma   CT abdomen and pelvis showing lesions in pancreatic tail along with persistent hypoglycemia c/f Insulinoma  Surg onc consulted   s/p central line d20 started  - c/w d20 + d10/NS  - Diazoxide 300mg q8h per endo   - Q6h FS  - Octreotide to 300mg qd + every 6 hours prn  - f/u hypoglycemic labs   - endocrine following, recs appreciated      Hematology   #LUE swelling  - LUE duplex to r/o DVT   - CTM     #DVT ppx   - Lovenox     Oncology  #pancreatic tail mass  #insulinoma   CT Abdomen and pelvis showing Lobulated 4.3 cm enhancing pancreatic tail mass, suspicious for a neuroendocrine tumor given the patient's clinical history. Additional 2.1 cm indeterminate hypoattenuating lesion in the pancreatic body.   MR abdomen and MRCP ordered for further Consider contrast enhanced MRI of the abdomen/MRCP for further evaluation.  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 Initially lethargic 2/2 to hypoglycemia   CTH showed no acute intracranial hemorrhage, mass effect, or midline shift.  - CTM    Cardiovascular  # Hypertension  - at home on amlodipine 7.5mg daily   - can continue for now     Respiratory  # non-labored breathing, satting 95% on room air   - no active issues     GI/Nutrition  # Diarrhea   RESOLVED   - had 3-4 days of watery diarrhea in [place]  - other close contacts had similar symptoms, has resolved  - likely viral gastroenteritis, less likely VIPoma given self resolving nature of diarrhea and close contacts with similar symptoms   - can consider GI PCR if diarrhea recurs     #Nutrition  - DASH, TLC    /Renal  - no active issues     ID  - patient afebrile, no leukocytosis   - f/u urine cultures     Endocrine  #Hypoglycemia  #Insulinoma   CT abdomen and pelvis showing lesions in pancreatic tail along with persistent hypoglycemia c/f Insulinoma  Surg onc consulted   s/p central line d20 started  - resection planned for 1/10 per surg nc  - NPO after midnight w/ pre-op labs   - c/w d20 + d10/NS  - Diazoxide 300mg q8h per endo   - Q6h FS  - Octreotide to 300mg qd + every 6 hours prn  - f/u hypoglycemic labs   - endocrine following, recs appreciated      Hematology   #LUE swelling  - LUE duplex to r/o DVT   - CTM     #DVT ppx   - Lovenox     Oncology  #pancreatic tail mass  #insulinoma   CT Abdomen and pelvis showing Lobulated 4.3 cm enhancing pancreatic tail mass, suspicious for a neuroendocrine tumor given the patient's clinical history. Additional 2.1 cm indeterminate hypoattenuating lesion in the pancreatic body.   MR abdomen and MRCP ordered for further Consider contrast enhanced MRI of the abdomen/MRCP for further evaluation.  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 Initially lethargic 2/2 to hypoglycemia   CTH showed no acute intracranial hemorrhage, mass effect, or midline shift.  - CTM    Cardiovascular  # Hypertension  - at home on amlodipine 7.5mg daily   - can continue for now     Respiratory  # non-labored breathing, satting 95% on room air   - no active issues     GI/Nutrition  # Diarrhea   RESOLVED   3-4 days of watery diarrhea in [place] w/ close contacts w/ similar symptoms   likely viral gastroenteritis, less likely VIPoma given self resolving nature of diarrhea and close contacts with similar symptoms   - can consider GI PCR if diarrhea recurs     #Nutrition  - DASH, TLC  - NPO after midnight     /Renal  - no active issues     ID  - patient afebrile, no leukocytosis   - f/u urine cultures     Endocrine  #Hypoglycemia  #Insulinoma   CT abdomen and pelvis showing lesions in pancreatic tail along with persistent hypoglycemia c/f Insulinoma  Surg onc consulted   s/p central line d20 started  - resection planned for 1/10 per surg nc  - NPO after midnight w/ pre-op labs   - c/w d20 + d10/NS  - Diazoxide 300mg q8h per endo   - Q6h FS  - Octreotide to 300mg qd + every 6 hours prn  - f/u hypoglycemic labs   - endocrine following, recs appreciated      Hematology   #LUE swelling  - LUE duplex to r/o DVT   - CTM     #DVT ppx   - Lovenox     Oncology  #pancreatic tail mass  #insulinoma   CT Abdomen and pelvis showing Lobulated 4.3 cm enhancing pancreatic tail mass, suspicious for a neuroendocrine tumor given the patient's clinical history. Additional 2.1 cm indeterminate hypoattenuating lesion in the pancreatic body.   MR abdomen and MRCP ordered for further Consider contrast enhanced MRI of the abdomen/MRCP for further evaluation.  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

## 2025-01-09 NOTE — PROGRESS NOTE ADULT - SUBJECTIVE AND OBJECTIVE BOX
Surgery Daily Progress Note    24 Hour/ Interval Events:  - No acute overnight events.     Subjective and Interval:  Seen and examined at bedside. Afebrile.   ___________________________________________________  Vital Signs  T(C): 36.8 (00:00), Max: 36.9 (16:00)  HR: 95 (01:00) (81 - 103)  BP: 115/57 (01:00) (109/58 - 147/64)  ABP: --  ABP(mean): --  RR: 17 (01:00) (13 - 35)  SpO2: 95% (01:00) (94% - 98%)    Physical Exam  General: WN/WD NAD  Neurology: A&Ox3, nonfocal, follows commands  Respiratory: CTA B/L  CV: Normal rate regular rhythm  Abdominal: Soft, NT, ND  Extremities: No edema      In&Out    01-07-25 @ 07:01  -  01-08-25 @ 07:00  --------------------------------------------------------  IN: 4565 mL / OUT: 2270 mL / NET: 2295 mL    01-08-25 @ 07:01  -  01-09-25 @ 02:08  --------------------------------------------------------  IN: 3925 mL / OUT: 1250 mL / NET: 2675 mL    Labs                          10.6   5.91  )-----------( 279      ( 08 Jan 2025 23:51 )             36.1     01-08    139  |  106  |  10  ----------------------------<  215[H]  3.3[L]   |  23  |  0.94    Ca    8.3[L]      08 Jan 2025 23:51  Phos  2.8     01-08  Mg     2.0     01-08    TPro  6.2  /  Alb  3.3  /  TBili  0.2  /  DBili  x   /  AST  10  /  ALT  13  /  AlkPhos  62  01-08    PT/INR - ( 08 Jan 2025 23:51 )   PT: 12.6 sec;   INR: 1.11 ratio         PTT - ( 08 Jan 2025 23:51 )  PTT:32.5 sec      Medications  acetaminophen     Tablet .. 650 milliGRAM(s) Oral every 6 hours PRN  amLODIPine   Tablet 7.5 milliGRAM(s) Oral daily  chlorhexidine 2% Cloths 1 Application(s) Topical <User Schedule>  chlorhexidine 4% Liquid 1 Application(s) Topical <User Schedule>  dextrose 10% + sodium chloride 0.9%. 1000 milliLiter(s) IV Continuous <Continuous>  dextrose 20%. 500 milliLiter(s) IV Continuous <Continuous>  diazoxide Suspension 300 milliGRAM(s) Oral every 8 hours  enoxaparin Injectable 40 milliGRAM(s) SubCutaneous every 24 hours  fluticasone propionate 50 MICROgram(s)/spray Nasal Spray 1 Spray(s) Both Nostrils two times a day  octreotide  Injectable 300 MICROGram(s) IV Push every 8 hours  polyethylene glycol 3350 17 Gram(s) Oral daily  sodium chloride 0.65% Nasal 1 Spray(s) Both Nostrils three times a day PRN  sodium chloride 0.9% lock flush 10 milliLiter(s) IV Push every 1 hour PRN        ___________________________________________________  Assessment & Plan      44 M PMH HTN who initially presented after experiencing generalized weakness and confusion who was admitted to MICU for significant and persistent hypoglycemia. CT showing 4x3cm lesion in tail of pancreas and 2x2cm lesion in body. Patient with persistent hypoglycemia despite D10 administration. Patient also with elevated c-peptide of 5.6. 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    Plan:   - High suspicion for insulinoma in tail of panc, plan for resection 01/10 at [facility] with Dr. Woods  - Appreciate EUS results and will f/u tissue path  - Patient should be transferred to [facility] 01/09/25 in preparation for distal pancreatectomy 01/10 at [facility] with Dr. Chuck Chiu Surgery, [id number]        Surgery Daily Progress Note    24 Hour/ Interval Events:  - No acute overnight events.     Subjective and Interval:  Seen and examined at bedside. Afebrile.   ___________________________________________________  Vital Signs  T(C): 36.8 (00:00), Max: 36.9 (16:00)  HR: 95 (01:00) (81 - 103)  BP: 115/57 (01:00) (109/58 - 147/64)  ABP: --  ABP(mean): --  RR: 17 (01:00) (13 - 35)  SpO2: 95% (01:00) (94% - 98%)    Physical Exam  General: WN/WD NAD  Neurology: A&Ox3, nonfocal, follows commands  Respiratory: CTA B/L  CV: Normal rate regular rhythm  Abdominal: Soft, NT, ND  Extremities: No edema      In&Out    01-07-25 @ 07:01  -  01-08-25 @ 07:00  --------------------------------------------------------  IN: 4565 mL / OUT: 2270 mL / NET: 2295 mL    01-08-25 @ 07:01  -  01-09-25 @ 02:08  --------------------------------------------------------  IN: 3925 mL / OUT: 1250 mL / NET: 2675 mL    Labs                          10.6   5.91  )-----------( 279      ( 08 Jan 2025 23:51 )             36.1     01-08    139  |  106  |  10  ----------------------------<  215[H]  3.3[L]   |  23  |  0.94    Ca    8.3[L]      08 Jan 2025 23:51  Phos  2.8     01-08  Mg     2.0     01-08    TPro  6.2  /  Alb  3.3  /  TBili  0.2  /  DBili  x   /  AST  10  /  ALT  13  /  AlkPhos  62  01-08    PT/INR - ( 08 Jan 2025 23:51 )   PT: 12.6 sec;   INR: 1.11 ratio         PTT - ( 08 Jan 2025 23:51 )  PTT:32.5 sec      Medications  acetaminophen     Tablet .. 650 milliGRAM(s) Oral every 6 hours PRN  amLODIPine   Tablet 7.5 milliGRAM(s) Oral daily  chlorhexidine 2% Cloths 1 Application(s) Topical <User Schedule>  chlorhexidine 4% Liquid 1 Application(s) Topical <User Schedule>  dextrose 10% + sodium chloride 0.9%. 1000 milliLiter(s) IV Continuous <Continuous>  dextrose 20%. 500 milliLiter(s) IV Continuous <Continuous>  diazoxide Suspension 300 milliGRAM(s) Oral every 8 hours  enoxaparin Injectable 40 milliGRAM(s) SubCutaneous every 24 hours  fluticasone propionate 50 MICROgram(s)/spray Nasal Spray 1 Spray(s) Both Nostrils two times a day  octreotide  Injectable 300 MICROGram(s) IV Push every 8 hours  polyethylene glycol 3350 17 Gram(s) Oral daily  sodium chloride 0.65% Nasal 1 Spray(s) Both Nostrils three times a day PRN  sodium chloride 0.9% lock flush 10 milliLiter(s) IV Push every 1 hour PRN        ___________________________________________________  Assessment & Plan      44 M PMH HTN who initially presented after experiencing generalized weakness and confusion who was admitted to MICU for significant and persistent hypoglycemia. CT showing 4x3cm lesion in tail of pancreas and 2x2cm lesion in body. Patient with persistent hypoglycemia despite D10 administration. Patient also with elevated c-peptide of 5.6. 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    Plan:   - High suspicion for insulinoma in tail of panc, plan for resection 01/10 at [facility] with Dr. Woods  - Appreciate EUS results and will f/u tissue path  - Patient should be transferred to [facility] in preparation for distal pancreatectomy 01/10 at [facility] with Dr. Chuck Chiu Surgery, [id number]

## 2025-01-10 ENCOUNTER — APPOINTMENT (OUTPATIENT)
Dept: SURGICAL ONCOLOGY | Facility: HOSPITAL | Age: 45
End: 2025-01-10
Payer: COMMERCIAL

## 2025-01-10 LAB
24R-OH-CALCIDIOL SERPL-MCNC: 12.7 NG/ML — LOW (ref 30–80)
ALBUMIN SERPL ELPH-MCNC: 3.3 G/DL — SIGNIFICANT CHANGE UP (ref 3.3–5)
ALBUMIN SERPL ELPH-MCNC: 3.3 G/DL — SIGNIFICANT CHANGE UP (ref 3.3–5)
ALP SERPL-CCNC: 65 U/L — SIGNIFICANT CHANGE UP (ref 40–120)
ALP SERPL-CCNC: 74 U/L — SIGNIFICANT CHANGE UP (ref 40–120)
ALT FLD-CCNC: 10 U/L — SIGNIFICANT CHANGE UP (ref 10–45)
ALT FLD-CCNC: 29 U/L — SIGNIFICANT CHANGE UP (ref 10–45)
ANION GAP SERPL CALC-SCNC: 12 MMOL/L — SIGNIFICANT CHANGE UP (ref 5–17)
ANION GAP SERPL CALC-SCNC: 14 MMOL/L — SIGNIFICANT CHANGE UP (ref 5–17)
APTT BLD: 29.3 SEC — SIGNIFICANT CHANGE UP (ref 24.5–35.6)
APTT BLD: 30.5 SEC — SIGNIFICANT CHANGE UP (ref 24.5–35.6)
AST SERPL-CCNC: 27 U/L — SIGNIFICANT CHANGE UP (ref 10–40)
AST SERPL-CCNC: 8 U/L — LOW (ref 10–40)
BASOPHILS # BLD AUTO: 0 K/UL — SIGNIFICANT CHANGE UP (ref 0–0.2)
BASOPHILS # BLD AUTO: 0.03 K/UL — SIGNIFICANT CHANGE UP (ref 0–0.2)
BASOPHILS NFR BLD AUTO: 0 % — SIGNIFICANT CHANGE UP (ref 0–2)
BASOPHILS NFR BLD AUTO: 0.2 % — SIGNIFICANT CHANGE UP (ref 0–2)
BILIRUB SERPL-MCNC: 0.3 MG/DL — SIGNIFICANT CHANGE UP (ref 0.2–1.2)
BILIRUB SERPL-MCNC: 0.7 MG/DL — SIGNIFICANT CHANGE UP (ref 0.2–1.2)
BUN SERPL-MCNC: 11 MG/DL — SIGNIFICANT CHANGE UP (ref 7–23)
BUN SERPL-MCNC: 11 MG/DL — SIGNIFICANT CHANGE UP (ref 7–23)
CA-I BLD-SCNC: 1.15 MMOL/L — SIGNIFICANT CHANGE UP (ref 1.15–1.33)
CALCIUM SERPL-MCNC: 8.5 MG/DL — SIGNIFICANT CHANGE UP (ref 8.4–10.5)
CALCIUM SERPL-MCNC: 8.9 MG/DL — SIGNIFICANT CHANGE UP (ref 8.4–10.5)
CEA FLD-MCNC: <0.6 NG/ML — SIGNIFICANT CHANGE UP
CHLORIDE SERPL-SCNC: 100 MMOL/L — SIGNIFICANT CHANGE UP (ref 96–108)
CHLORIDE SERPL-SCNC: 104 MMOL/L — SIGNIFICANT CHANGE UP (ref 96–108)
CO2 SERPL-SCNC: 20 MMOL/L — LOW (ref 22–31)
CO2 SERPL-SCNC: 24 MMOL/L — SIGNIFICANT CHANGE UP (ref 22–31)
CREAT SERPL-MCNC: 0.9 MG/DL — SIGNIFICANT CHANGE UP (ref 0.5–1.3)
CREAT SERPL-MCNC: 0.9 MG/DL — SIGNIFICANT CHANGE UP (ref 0.5–1.3)
EGFR: 109 ML/MIN/1.73M2 — SIGNIFICANT CHANGE UP
EGFR: 109 ML/MIN/1.73M2 — SIGNIFICANT CHANGE UP
EOSINOPHIL # BLD AUTO: 0 K/UL — SIGNIFICANT CHANGE UP (ref 0–0.5)
EOSINOPHIL # BLD AUTO: 0.57 K/UL — HIGH (ref 0–0.5)
EOSINOPHIL NFR BLD AUTO: 0 % — SIGNIFICANT CHANGE UP (ref 0–6)
EOSINOPHIL NFR BLD AUTO: 9.6 % — HIGH (ref 0–6)
GAS PNL BLDA: SIGNIFICANT CHANGE UP
GAS PNL BLDV: SIGNIFICANT CHANGE UP
GLUCOSE BLDC GLUCOMTR-MCNC: 117 MG/DL — HIGH (ref 70–99)
GLUCOSE BLDC GLUCOMTR-MCNC: 139 MG/DL — HIGH (ref 70–99)
GLUCOSE BLDC GLUCOMTR-MCNC: 266 MG/DL — HIGH (ref 70–99)
GLUCOSE BLDC GLUCOMTR-MCNC: 279 MG/DL — HIGH (ref 70–99)
GLUCOSE BLDC GLUCOMTR-MCNC: 303 MG/DL — HIGH (ref 70–99)
GLUCOSE SERPL-MCNC: 133 MG/DL — HIGH (ref 70–99)
GLUCOSE SERPL-MCNC: 279 MG/DL — HIGH (ref 70–99)
HCT VFR BLD CALC: 34.8 % — LOW (ref 39–50)
HCT VFR BLD CALC: 37.5 % — LOW (ref 39–50)
HGB BLD-MCNC: 10.3 G/DL — LOW (ref 13–17)
HGB BLD-MCNC: 11.2 G/DL — LOW (ref 13–17)
IMM GRANULOCYTES NFR BLD AUTO: 0.8 % — SIGNIFICANT CHANGE UP (ref 0–0.9)
INR BLD: 1.1 RATIO — SIGNIFICANT CHANGE UP (ref 0.85–1.16)
INR BLD: 1.13 RATIO — SIGNIFICANT CHANGE UP (ref 0.85–1.16)
LYMPHOCYTES # BLD AUTO: 0.93 K/UL — LOW (ref 1–3.3)
LYMPHOCYTES # BLD AUTO: 0.97 K/UL — LOW (ref 1–3.3)
LYMPHOCYTES # BLD AUTO: 15.6 % — SIGNIFICANT CHANGE UP (ref 13–44)
LYMPHOCYTES # BLD AUTO: 6.2 % — LOW (ref 13–44)
MAGNESIUM SERPL-MCNC: 1.9 MG/DL — SIGNIFICANT CHANGE UP (ref 1.6–2.6)
MAGNESIUM SERPL-MCNC: 2 MG/DL — SIGNIFICANT CHANGE UP (ref 1.6–2.6)
MANUAL SMEAR VERIFICATION: SIGNIFICANT CHANGE UP
MCHC RBC-ENTMCNC: 20.1 PG — LOW (ref 27–34)
MCHC RBC-ENTMCNC: 20.2 PG — LOW (ref 27–34)
MCHC RBC-ENTMCNC: 29.6 G/DL — LOW (ref 32–36)
MCHC RBC-ENTMCNC: 29.9 G/DL — LOW (ref 32–36)
MCV RBC AUTO: 67.6 FL — LOW (ref 80–100)
MCV RBC AUTO: 67.8 FL — LOW (ref 80–100)
MONOCYTES # BLD AUTO: 0.21 K/UL — SIGNIFICANT CHANGE UP (ref 0–0.9)
MONOCYTES # BLD AUTO: 0.33 K/UL — SIGNIFICANT CHANGE UP (ref 0–0.9)
MONOCYTES NFR BLD AUTO: 2.1 % — SIGNIFICANT CHANGE UP (ref 2–14)
MONOCYTES NFR BLD AUTO: 3.5 % — SIGNIFICANT CHANGE UP (ref 2–14)
NEUTROPHILS # BLD AUTO: 14.23 K/UL — HIGH (ref 1.8–7.4)
NEUTROPHILS # BLD AUTO: 4.24 K/UL — SIGNIFICANT CHANGE UP (ref 1.8–7.4)
NEUTROPHILS NFR BLD AUTO: 71.3 % — SIGNIFICANT CHANGE UP (ref 43–77)
NEUTROPHILS NFR BLD AUTO: 90.7 % — HIGH (ref 43–77)
NRBC # BLD: 0 /100 WBCS — SIGNIFICANT CHANGE UP (ref 0–0)
PHOSPHATE SERPL-MCNC: 3.1 MG/DL — SIGNIFICANT CHANGE UP (ref 2.5–4.5)
PHOSPHATE SERPL-MCNC: 4.2 MG/DL — SIGNIFICANT CHANGE UP (ref 2.5–4.5)
PLAT MORPH BLD: NORMAL — SIGNIFICANT CHANGE UP
PLATELET # BLD AUTO: 273 K/UL — SIGNIFICANT CHANGE UP (ref 150–400)
PLATELET # BLD AUTO: 322 K/UL — SIGNIFICANT CHANGE UP (ref 150–400)
POTASSIUM SERPL-MCNC: 3.5 MMOL/L — SIGNIFICANT CHANGE UP (ref 3.5–5.3)
POTASSIUM SERPL-MCNC: 4.6 MMOL/L — SIGNIFICANT CHANGE UP (ref 3.5–5.3)
POTASSIUM SERPL-SCNC: 3.5 MMOL/L — SIGNIFICANT CHANGE UP (ref 3.5–5.3)
POTASSIUM SERPL-SCNC: 4.6 MMOL/L — SIGNIFICANT CHANGE UP (ref 3.5–5.3)
PROT SERPL-MCNC: 6.3 G/DL — SIGNIFICANT CHANGE UP (ref 6–8.3)
PROT SERPL-MCNC: 6.6 G/DL — SIGNIFICANT CHANGE UP (ref 6–8.3)
PROTHROM AB SERPL-ACNC: 12.6 SEC — SIGNIFICANT CHANGE UP (ref 9.9–13.4)
PROTHROM AB SERPL-ACNC: 12.9 SEC — SIGNIFICANT CHANGE UP (ref 9.9–13.4)
RBC # BLD: 5.13 M/UL — SIGNIFICANT CHANGE UP (ref 4.2–5.8)
RBC # BLD: 5.55 M/UL — SIGNIFICANT CHANGE UP (ref 4.2–5.8)
RBC # FLD: 16.3 % — HIGH (ref 10.3–14.5)
RBC # FLD: 16.6 % — HIGH (ref 10.3–14.5)
RBC BLD AUTO: SIGNIFICANT CHANGE UP
SODIUM SERPL-SCNC: 134 MMOL/L — LOW (ref 135–145)
SODIUM SERPL-SCNC: 140 MMOL/L — SIGNIFICANT CHANGE UP (ref 135–145)
SPECIMEN SOURCE: SIGNIFICANT CHANGE UP
WBC # BLD: 15.68 K/UL — HIGH (ref 3.8–10.5)
WBC # BLD: 5.95 K/UL — SIGNIFICANT CHANGE UP (ref 3.8–10.5)
WBC # FLD AUTO: 15.68 K/UL — HIGH (ref 3.8–10.5)
WBC # FLD AUTO: 5.95 K/UL — SIGNIFICANT CHANGE UP (ref 3.8–10.5)

## 2025-01-10 PROCEDURE — 99291 CRITICAL CARE FIRST HOUR: CPT

## 2025-01-10 PROCEDURE — 88342 IMHCHEM/IMCYTCHM 1ST ANTB: CPT | Mod: 26,59

## 2025-01-10 PROCEDURE — S2900 ROBOTIC SURGICAL SYSTEM: CPT | Mod: NC

## 2025-01-10 PROCEDURE — 99232 SBSQ HOSP IP/OBS MODERATE 35: CPT | Mod: GC

## 2025-01-10 PROCEDURE — 48140 PARTIAL REMOVAL OF PANCREAS: CPT

## 2025-01-10 PROCEDURE — 99233 SBSQ HOSP IP/OBS HIGH 50: CPT | Mod: GC

## 2025-01-10 PROCEDURE — 88313 SPECIAL STAINS GROUP 2: CPT | Mod: 26

## 2025-01-10 PROCEDURE — 88309 TISSUE EXAM BY PATHOLOGIST: CPT | Mod: 26

## 2025-01-10 PROCEDURE — 88341 IMHCHEM/IMCYTCHM EA ADD ANTB: CPT | Mod: 26

## 2025-01-10 PROCEDURE — 88360 TUMOR IMMUNOHISTOCHEM/MANUAL: CPT | Mod: 26

## 2025-01-10 DEVICE — SURGICEL 2 X 14": Type: IMPLANTABLE DEVICE | Status: FUNCTIONAL

## 2025-01-10 DEVICE — STAPLER COVIDIEN TRI-STAPLE 60MM PURPLE RELOAD: Type: IMPLANTABLE DEVICE | Status: FUNCTIONAL

## 2025-01-10 DEVICE — KIT A-LINE 1LUM 20G X 12CM SAFE KIT: Type: IMPLANTABLE DEVICE | Status: FUNCTIONAL

## 2025-01-10 DEVICE — STAPLER COVIDIEN TRI-STAPLE 60MM PURPLE INTELLIGENT RELOAD: Type: IMPLANTABLE DEVICE | Status: FUNCTIONAL

## 2025-01-10 DEVICE — SURGICEL POWDER 3 GRAMS: Type: IMPLANTABLE DEVICE | Status: FUNCTIONAL

## 2025-01-10 RX ORDER — CHLORHEXIDINE GLUCONATE 1.2 MG/ML
1 RINSE ORAL
Refills: 0 | Status: DISCONTINUED | OUTPATIENT
Start: 2025-01-10 | End: 2025-01-14

## 2025-01-10 RX ORDER — POTASSIUM CHLORIDE 600 MG/1
20 TABLET, FILM COATED, EXTENDED RELEASE ORAL ONCE
Refills: 0 | Status: COMPLETED | OUTPATIENT
Start: 2025-01-10 | End: 2025-01-10

## 2025-01-10 RX ORDER — CEFOTETAN DISODIUM 2 G
2 VIAL (EA) INJECTION EVERY 12 HOURS
Refills: 0 | Status: COMPLETED | OUTPATIENT
Start: 2025-01-11 | End: 2025-01-11

## 2025-01-10 RX ORDER — OXYCODONE HCL 15 MG
2.5 TABLET ORAL EVERY 4 HOURS
Refills: 0 | Status: DISCONTINUED | OUTPATIENT
Start: 2025-01-10 | End: 2025-01-10

## 2025-01-10 RX ORDER — OXYCODONE HCL 15 MG
10 TABLET ORAL EVERY 4 HOURS
Refills: 0 | Status: DISCONTINUED | OUTPATIENT
Start: 2025-01-10 | End: 2025-01-14

## 2025-01-10 RX ORDER — INSULIN LISPRO 100/ML
VIAL (ML) SUBCUTANEOUS EVERY 4 HOURS
Refills: 0 | Status: DISCONTINUED | OUTPATIENT
Start: 2025-01-10 | End: 2025-01-10

## 2025-01-10 RX ORDER — HYDROMORPHONE HCL 4 MG
0.5 TABLET ORAL
Refills: 0 | Status: DISCONTINUED | OUTPATIENT
Start: 2025-01-10 | End: 2025-01-12

## 2025-01-10 RX ORDER — CHLORHEXIDINE GLUCONATE 1.2 MG/ML
1 RINSE ORAL
Refills: 0 | Status: DISCONTINUED | OUTPATIENT
Start: 2025-01-10 | End: 2025-01-11

## 2025-01-10 RX ORDER — OXYCODONE HCL 15 MG
5 TABLET ORAL EVERY 4 HOURS
Refills: 0 | Status: DISCONTINUED | OUTPATIENT
Start: 2025-01-10 | End: 2025-01-14

## 2025-01-10 RX ORDER — ACETAMINOPHEN 80 MG/.8ML
1000 SOLUTION/ DROPS ORAL EVERY 8 HOURS
Refills: 0 | Status: COMPLETED | OUTPATIENT
Start: 2025-01-10 | End: 2025-01-11

## 2025-01-10 RX ORDER — OXYCODONE HCL 15 MG
5 TABLET ORAL EVERY 4 HOURS
Refills: 0 | Status: DISCONTINUED | OUTPATIENT
Start: 2025-01-10 | End: 2025-01-10

## 2025-01-10 RX ORDER — SODIUM CHLORIDE 9 MG/ML
1000 INJECTION, SOLUTION INTRAVENOUS
Refills: 0 | Status: DISCONTINUED | OUTPATIENT
Start: 2025-01-10 | End: 2025-01-11

## 2025-01-10 RX ORDER — HYDROMORPHONE HCL 4 MG
0.5 TABLET ORAL ONCE
Refills: 0 | Status: DISCONTINUED | OUTPATIENT
Start: 2025-01-10 | End: 2025-01-10

## 2025-01-10 RX ORDER — LIDOCAINE 50 MG/G
1 OINTMENT TOPICAL EVERY 24 HOURS
Refills: 0 | Status: DISCONTINUED | OUTPATIENT
Start: 2025-01-10 | End: 2025-01-14

## 2025-01-10 RX ORDER — MAGNESIUM SULFATE 500 MG/ML
1 INJECTION, SOLUTION INTRAMUSCULAR; INTRAVENOUS ONCE
Refills: 0 | Status: COMPLETED | OUTPATIENT
Start: 2025-01-10 | End: 2025-01-10

## 2025-01-10 RX ADMIN — Medication 5 MILLIGRAM(S): at 23:26

## 2025-01-10 RX ADMIN — ACETAMINOPHEN 400 MILLIGRAM(S): 80 SOLUTION/ DROPS ORAL at 21:31

## 2025-01-10 RX ADMIN — DEXTROSE MONOHYDRATE 50 MILLILITER(S): 100 INJECTION, SOLUTION INTRAVENOUS at 08:00

## 2025-01-10 RX ADMIN — Medication 0.5 MILLIGRAM(S): at 20:38

## 2025-01-10 RX ADMIN — Medication 300 MILLIGRAM(S): at 07:28

## 2025-01-10 RX ADMIN — OCTREOTIDE ACETATE 300 MICROGRAM(S): KIT INTRAMUSCULAR at 06:42

## 2025-01-10 RX ADMIN — CHLORHEXIDINE GLUCONATE 1 APPLICATION(S): 1.2 RINSE ORAL at 06:43

## 2025-01-10 RX ADMIN — POTASSIUM CHLORIDE 100 MILLIEQUIVALENT(S): 600 TABLET, FILM COATED, EXTENDED RELEASE ORAL at 02:30

## 2025-01-10 RX ADMIN — MAGNESIUM SULFATE 100 GRAM(S): 500 INJECTION, SOLUTION INTRAMUSCULAR; INTRAVENOUS at 02:30

## 2025-01-10 RX ADMIN — DEXTROSE MONOHYDRATE 50 MILLILITER(S): 100 INJECTION, SOLUTION INTRAVENOUS at 04:00

## 2025-01-10 RX ADMIN — ACETAMINOPHEN 1000 MILLIGRAM(S): 80 SOLUTION/ DROPS ORAL at 22:01

## 2025-01-10 RX ADMIN — Medication 5 MILLIGRAM(S): at 22:26

## 2025-01-10 RX ADMIN — Medication 0.5 MILLIGRAM(S): at 22:01

## 2025-01-10 RX ADMIN — Medication 7.5 MILLIGRAM(S): at 06:43

## 2025-01-10 RX ADMIN — FLUTICASONE PROPIONATE 1 SPRAY(S): 50 SPRAY, METERED NASAL at 06:42

## 2025-01-10 RX ADMIN — Medication 0.5 MILLIGRAM(S): at 21:31

## 2025-01-10 RX ADMIN — Medication 100 GRAM(S): at 23:38

## 2025-01-10 RX ADMIN — LIDOCAINE 1 PATCH: 50 OINTMENT TOPICAL at 23:14

## 2025-01-10 RX ADMIN — Medication 0.5 MILLIGRAM(S): at 21:10

## 2025-01-10 RX ADMIN — SODIUM CHLORIDE 125 MILLILITER(S): 9 INJECTION, SOLUTION INTRAVENOUS at 21:06

## 2025-01-10 RX ADMIN — DEXTROSE MONOHYDRATE 50 MILLILITER(S): 100 INJECTION, SOLUTION INTRAVENOUS at 09:22

## 2025-01-10 NOTE — CONSULT NOTE ADULT - ATTENDING COMMENTS
As above  45 yo M w Hypertension here with hypoglycemia  Found to have tail of pancreas lesion concerning for PNET; likely insulinoma  Planned for distal panc later this week at [facility] with Dr. Jose Juan Woods  However there is a 2.4cm cyst in the BOP possibly IPMN, asking for EUS FNA for diagnostic testing of the cyst to eval for extended distal panc need  We will need endocrine recommendations for periprocedural insulin/glucagon dosing  Tentative plan for tomorrow afternoon    Thank you for this interesting consult.  Please call the advanced GI service with any questions or concerns.
Large pancreatic tail mass is consistent with insulinoma.  Pancreatic body more consistent with cystic neoplasm - possible IPMN.  Agree with MR evaluation.  Possible advanced GI evaluation for EUS pending above to determine extent of distal pancreatectomy.  Will plan resection this admission upon completion of diagnostic workup.
I have seen and examined this patient on multidisciplinary SICU rounds this morning. I have reviewed all new labs, imaging and reports. 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      Intraop received 1500ml IVF. EBL 300ml. UOP 200ml     Awake and alert   Abdomen is soft, mildly tender to palpation around lap incisions   Drain with serosanguinous output     Neuro: oxy/dilaudid PRN pain, lidocaine patch, tylenol    Pulm: room air, IS, early mobilization   CV: maintain MAP > 65, no pressors. Continue home amlodipine   GI: NPO with sips   Renal: , driscoll, monitor UOP    Heme: DVT ppx to start POD1   ID: cefotetan x24 hours, no active infection    Endo: monitor blood glucose q2 x6 hours, endocrinology following, stop octreotide, check drain amylase POD1   Lines: LIJ TLC, LRAL, driscoll   Dispo: SICU      The patient required critical care. Critical care time was indicated due to the patient's inherent instability and high risk for decompensation. E & M work was done by me in multiple 10-15 minute intervals over the course of the day in the ICU. I have coordinated care with multiple teams, and reviewed the medical record, consult notes, ICU flow sheets, cardiac monitoring, mechanical ventilation, medication record, brain and body imaging, and serial sequential labs.         Total time spent in the care of this patient today (excluding procedures): 40 minutes                     Over 50% of the total time was spent in discussion and coordination of care with consulting services, dietary and rehab services.      Ally Dee MD    Trauma, Acute Care Surgery, and Critical Care
Patient is a 44-year-old man with history of hypertension presenting with altered mental status, found to have severe hypoglycemia.  Patient denies any prior history of Whipple's triad prior to this insulin.  Patient recently traveled to [place] with his family and upon returning, was having diarrhea and vomiting as well.  The rest of his family is also sick for likely a viral illness versus altitude syndrome.  Patient was noted to be hypoglycemic to 39 mg/dL on basal metabolic panel.  CT abdomen was done given GI symptoms and was notable for a lobulated enhancing mass at the pancreatic tail measuring 4.3 x 3.7 cm.  Additional hypoattenuating lesion in the pancreatic body measuring 2.1 x 1.6 cm.  High suspicions for pancreatic neuroendocrine neoplasm, given hypoglycemic episode, suspecting insulinoma.  We were able to obtain BMP, insulin, proinsulin, C-peptide, beta hydroxybutyrate level when his glucose was under 55 mg/dL with follow-up these labs.  Radiology recommended obtaining MRI of the abdomen to further study the hypoattenuating lesion in the pancreatic body, therefore would consider obtaining outpatient as admitted.  If the diagnosis of insulinoma is confirmed, patient will need surgical oncology involvement during this admission given the diagnosis of pancreatic tumor.   Obtain other neuroendocrine marker including gastrinc level, 5HIAA and Chromogrin A level as baseline.

## 2025-01-10 NOTE — CONSULT NOTE ADULT - SUBJECTIVE AND OBJECTIVE BOX
HISTORY  44y Male    24 HOUR EVENTS:    SUBJECTIVE/ROS:  [ ] A ten-point review of systems was otherwise negative except as noted.  [ ] Due to altered mental status/intubation, subjective information were not able to be obtained from the patient. History was obtained, to the extent possible, from review of the chart and collateral sources of information.      NEURO  Exam: awake, alert, oriented  Meds: acetaminophen   IVPB .. 1000 milliGRAM(s) IV Intermittent every 8 hours  HYDROmorphone  Injectable 0.5 milliGRAM(s) IV Push every 3 hours PRN breakthrough pain  oxyCODONE    IR 5 milliGRAM(s) Oral every 4 hours PRN Moderate Pain (4 - 6)  oxyCODONE    IR 10 milliGRAM(s) Oral every 4 hours PRN Severe Pain (7 - 10)    [x] Adequacy of sedation and pain control has been assessed and adjusted      RESPIRATORY  RR: 14 (01-11-25 @ 00:00) (14 - 21)  SpO2: 96% (01-11-25 @ 00:00) (92% - 97%)  Wt(kg): --  Exam: unlabored, clear to auscultation bilaterally  Mechanical Ventilation:   ABG - ( 10 Issa 2025 20:00 )  pH: 7.33  /  pCO2: 46    /  pO2: 72    / HCO3: 24    / Base Excess: -1.8  /  SaO2: 96.5    Lactate: x                [N/A] Extubation Readiness Assessed  Meds:       CARDIOVASCULAR  HR: 112 (01-11-25 @ 00:00) (92 - 112)  BP: 131/60 (01-10-25 @ 20:00) (89/55 - 145/63)  BP(mean): 86 (01-10-25 @ 20:00) (68 - 90)  ABP: 123/66 (01-11-25 @ 00:00) (123/65 - 143/68)  ABP(mean): 82 (01-11-25 @ 00:00) (82 - 90)  Wt(kg): --  CVP(cm H2O): --  VBG - ( 10 Issa 2025 00:46 )  pH: 7.38  /  pCO2: 50    /  pO2: 56    / HCO3: 30    / Base Excess: 3.7   /  SaO2: 89.8   Lactate: 1.6                Exam: regular rate and rhythm  Cardiac Rhythm: sinus  Perfusion     [x]Adequate   [ ]Inadequate  Mentation   [x]Normal       [ ]Reduced  Extremities  [x]Warm         [ ]Cool  Volume Status [ ]Hypervolemic [x]Euvolemic [ ]Hypovolemic  Meds:       GI/NUTRITION  Exam: soft, nontender, nondistended, incision C/D/I  Diet:  Meds:     GENITOURINARY  I&O's Detail    01-09 @ 07:01  -  01-10 @ 07:00  --------------------------------------------------------  IN:    dextrose 10% + sodium chloride 0.9%: 500 mL    dextrose 10% + sodium chloride 0.9%: 1425 mL    dextrose 20%: 1150 mL    IV PiggyBack: 200 mL    Oral Fluid: 900 mL  Total IN: 4175 mL    OUT:    Voided (mL): 1120 mL  Total OUT: 1120 mL    Total NET: 3055 mL      01-10 @ 07:01 - 01-11 @ 00:59  --------------------------------------------------------  IN:    dextrose 10% + sodium chloride 0.9%: 150 mL    dextrose 20%: 100 mL    IV PiggyBack: 150 mL    Lactated Ringers: 625 mL  Total IN: 1025 mL    OUT:    Indwelling Catheter - Urethral (mL): 975 mL    Voided (mL): 600 mL  Total OUT: 1575 mL    Total NET: -550 mL        Weight (kg): 133.8 (01-10 @ 15:10)  01-10    134[L]  |  100  |  11  ----------------------------<  279[H]  4.6   |  20[L]  |  0.90    Ca    8.9      10 Issa 2025 20:12  Phos  4.2     01-10  Mg     2.0     01-10    TPro  6.6  /  Alb  3.3  /  TBili  0.7  /  DBili  x   /  AST  27  /  ALT  29  /  AlkPhos  74  01-10    [ ] Valencia catheter, indication: N/A  Meds: lactated ringers. 1000 milliLiter(s) IV Continuous <Continuous>        HEMATOLOGIC  Meds:   [x] VTE Prophylaxis                        11.2   15.68 )-----------( 322      ( 10 Issa 2025 20:12 )             37.5     PT/INR - ( 10 Issa 2025 20:12 )   PT: 12.9 sec;   INR: 1.13 ratio         PTT - ( 10 Issa 2025 20:12 )  PTT:29.3 sec      INFECTIOUS DISEASES  WBC Count: 15.68 K/uL (01-10 @ 20:12)    RECENT CULTURES:    Meds: cefoTEtan  IVPB 2 Gram(s) IV Intermittent every 12 hours        ENDOCRINE  CAPILLARY BLOOD GLUCOSE      POCT Blood Glucose.: 303 mg/dL (10 Issa 2025 23:57)  POCT Blood Glucose.: 279 mg/dL (10 Issa 2025 22:05)  POCT Blood Glucose.: 266 mg/dL (10 Issa 2025 20:15)  POCT Blood Glucose.: 139 mg/dL (10 Issa 2025 12:09)  POCT Blood Glucose.: 117 mg/dL (10 Issa 2025 07:02)    Meds:       CODE STATUS:   HISTORY  44 M with pmhx of HTN who initially presented after experiencing generalized weakness and confusion who was admitted to MICU for significant and persistent hypoglycemia c/f insulinoma treated with fluids, octreotide, diazoxide, now s/p distal pancreatectomy and splenectomy 1/10.    SUBJECTIVE/ROS:  [ ] A ten-point review of systems was otherwise negative except as noted.  [ ] Due to altered mental status/intubation, subjective information were not able to be obtained from the patient. History was obtained, to the extent possible, from review of the chart and collateral sources of information.      NEURO  Exam:  Waking up from anesthesia, sleepy  Meds: acetaminophen   IVPB .. 1000 milliGRAM(s) IV Intermittent every 8 hours  HYDROmorphone  Injectable 0.5 milliGRAM(s) IV Push every 3 hours PRN breakthrough pain  oxyCODONE    IR 5 milliGRAM(s) Oral every 4 hours PRN Moderate Pain (4 - 6)  oxyCODONE    IR 10 milliGRAM(s) Oral every 4 hours PRN Severe Pain (7 - 10)  [x] Adequacy of sedation and pain control has been assessed and adjusted      RESPIRATORY  RR: 14 (01-11-25 @ 00:00) (14 - 21)  SpO2: 96% (01-11-25 @ 00:00) (92% - 97%)  Exam: unlabored, clear to auscultation bilaterally  ABG - ( 10 Issa 2025 20:00 )  pH: 7.33  /  pCO 2: 46    /  pO2: 72    / HCO3: 24    / Base Excess: -1.8  /  SaO2: 96.5        CARDIOVASCULAR  HR: 112 (01-11-25 @ 00:00) (92 - 112)  BP: 131/60 (01-10-25 @ 20:00) (89/55 - 145/63)  BP(mean): 86 (01-10-25 @ 20:00) (68 - 90)  ABP: 123/66 (01-11-25 @ 00:00) (123/65 - 143/68)  ABP(mean): 82 (01-11-25 @ 00:00) (82 - 90)  VBG - ( 10 Issa 2025 00:46 )  pH: 7.38  /  pCO2: 50    /  pO2: 56    / HCO3: 30    / Base Excess: 3.7   /  SaO2: 89.8   Lactate: 1.6        Exam: regular rate and rhythm  Cardiac Rhythm: sinus  Perfusion     [x]Adequate   [ ]Inadequate  Mentation   [x]Normal       [ ]Reduced  Extremities  [x]Warm         [ ]Cool  Volume Status [ ]Hypervolemic [x]Euvolemic [ ]Hypovolemic      GI/NUTRITION  Exam: soft, nontender, nondistended  Diet: npo    GENITOURINARY  I&O's Detail    01-09 @ 07:01  -  01-10 @ 07:00  --------------------------------------------------------  IN:    dextrose 10% + sodium chloride 0.9%: 500 mL    dextrose 10% + sodium chloride 0.9%: 1425 mL    dextrose 20%: 1150 mL    IV PiggyBack: 200 mL    Oral Fluid: 900 mL  Total IN: 4175 mL    OUT:    Voided (mL): 1120 mL  Total OUT: 1120 mL    Total NET: 3055 mL      01-10 @ 07:01 - 01-11 @ 00:59  --------------------------------------------------------  IN:    dextrose 10% + sodium chloride 0.9%: 150 mL    dextrose 20%: 100 mL    IV PiggyBack: 150 mL    Lactated Ringers: 625 mL  Total IN: 1025 mL    OUT:    Indwelling Catheter - Urethral (mL): 975 mL    Voided (mL): 600 mL  Total OUT: 1575 mL    Total NET: -550 mL        Weight (kg): 133.8 (01-10 @ 15:10)  01-10    134[L]  |  100  |  11  ----------------------------<  279[H]  4.6   |  20[L]  |  0.90    Ca    8.9      10 Issa 2025 20:12  Phos  4.2     01-10  Mg     2.0     01-10    TPro  6.6  /  Alb  3.3  /  TBili  0.7  /  DBili  x   /  AST  27  /  ALT  29  /  AlkPhos  74  01-10    [ ] Valencia catheter, indication: N/A  Meds: lactated ringers. 1000 milliLiter(s) IV Continuous <Continuous>        HEMATOLOGIC  Meds:   [x] VTE Prophylaxis                        11.2   15.68 )-----------( 322      ( 10 Issa 2025 20:12 )             37.5     PT/INR - ( 10 Issa 2025 20:12 )   PT: 12.9 sec;   INR: 1.13 ratio         PTT - ( 10 Issa 2025 20:12 )  PTT:29.3 sec      INFECTIOUS DISEASES  WBC Count: 15.68 K/uL (01-10 @ 20:12)    RECENT CULTURES:    Meds: cefoTEtan  IVPB 2 Gram(s) IV Intermittent every 12 hours        ENDOCRINE  CAPILLARY BLOOD GLUCOSE      POCT Blood Glucose.: 303 mg/dL (10 Issa 2025 23:57)  POCT Blood Glucose.: 279 mg/dL (10 Issa 2025 22:05)  POCT Blood Glucose.: 266 mg/dL (10 Issa 2025 20:15)  POCT Blood Glucose.: 139 mg/dL (10 Issa 2025 12:09)  POCT Blood Glucose.: 117 mg/dL (10 Issa 2025 07:02)    Meds:       CODE STATUS: full code   HISTORY  44 M with pmhx of HTN who initially presented after experiencing generalized weakness and confusion who was admitted to MICU for significant and persistent hypoglycemia c/f insulinoma treated with fluids, octreotide, diazoxide, now s/p robotic distal pancreatectomy and splenectomy 1/10.    SUBJECTIVE/ROS:  [ ] A ten-point review of systems was otherwise negative except as noted.  [ ] Due to altered mental status/intubation, subjective information were not able to be obtained from the patient. History was obtained, to the extent possible, from review of the chart and collateral sources of information.      NEURO  Exam:  Waking up from anesthesia, sleepy  Meds: acetaminophen   IVPB .. 1000 milliGRAM(s) IV Intermittent every 8 hours  HYDROmorphone  Injectable 0.5 milliGRAM(s) IV Push every 3 hours PRN breakthrough pain  oxyCODONE    IR 5 milliGRAM(s) Oral every 4 hours PRN Moderate Pain (4 - 6)  oxyCODONE    IR 10 milliGRAM(s) Oral every 4 hours PRN Severe Pain (7 - 10)  [x] Adequacy of sedation and pain control has been assessed and adjusted      RESPIRATORY  RR: 14 (01-11-25 @ 00:00) (14 - 21)  SpO2: 96% (01-11-25 @ 00:00) (92% - 97%)  Exam: unlabored, clear to auscultation bilaterally  ABG - ( 10 Issa 2025 20:00 )  pH: 7.33  /  pCO 2: 46    /  pO2: 72    / HCO3: 24    / Base Excess: -1.8  /  SaO2: 96.5        CARDIOVASCULAR  HR: 112 (01-11-25 @ 00:00) (92 - 112)  BP: 131/60 (01-10-25 @ 20:00) (89/55 - 145/63)  BP(mean): 86 (01-10-25 @ 20:00) (68 - 90)  ABP: 123/66 (01-11-25 @ 00:00) (123/65 - 143/68)  ABP(mean): 82 (01-11-25 @ 00:00) (82 - 90)  VBG - ( 10 Issa 2025 00:46 )  pH: 7.38  /  pCO2: 50    /  pO2: 56    / HCO3: 30    / Base Excess: 3.7   /  SaO2: 89.8   Lactate: 1.6        Exam: regular rate and rhythm  Cardiac Rhythm: sinus  Perfusion     [x]Adequate   [ ]Inadequate  Mentation   [x]Normal       [ ]Reduced  Extremities  [x]Warm         [ ]Cool  Volume Status [ ]Hypervolemic [x]Euvolemic [ ]Hypovolemic      GI/NUTRITION  Exam: soft, nontender, nondistended  Diet: npo    GENITOURINARY  I&O's Detail    01-09 @ 07:01  -  01-10 @ 07:00  --------------------------------------------------------  IN:    dextrose 10% + sodium chloride 0.9%: 500 mL    dextrose 10% + sodium chloride 0.9%: 1425 mL    dextrose 20%: 1150 mL    IV PiggyBack: 200 mL    Oral Fluid: 900 mL  Total IN: 4175 mL    OUT:    Voided (mL): 1120 mL  Total OUT: 1120 mL    Total NET: 3055 mL      01-10 @ 07:01 - 01-11 @ 00:59  --------------------------------------------------------  IN:    dextrose 10% + sodium chloride 0.9%: 150 mL    dextrose 20%: 100 mL    IV PiggyBack: 150 mL    Lactated Ringers: 625 mL  Total IN: 1025 mL    OUT:    Indwelling Catheter - Urethral (mL): 975 mL    Voided (mL): 600 mL  Total OUT: 1575 mL    Total NET: -550 mL        Weight (kg): 133.8 (01-10 @ 15:10)  01-10    134[L]  |  100  |  11  ----------------------------<  279[H]  4.6   |  20[L]  |  0.90    Ca    8.9      10 Issa 2025 20:12  Phos  4.2     01-10  Mg     2.0     01-10    TPro  6.6  /  Alb  3.3  /  TBili  0.7  /  DBili  x   /  AST  27  /  ALT  29  /  AlkPhos  74  01-10    [ ] Valencia catheter, indication: N/A  Meds: lactated ringers. 1000 milliLiter(s) IV Continuous <Continuous>        HEMATOLOGIC  Meds:   [x] VTE Prophylaxis                        11.2   15.68 )-----------( 322      ( 10 Issa 2025 20:12 )             37.5     PT/INR - ( 10 Issa 2025 20:12 )   PT: 12.9 sec;   INR: 1.13 ratio         PTT - ( 10 Issa 2025 20:12 )  PTT:29.3 sec      INFECTIOUS DISEASES  WBC Count: 15.68 K/uL (01-10 @ 20:12)    RECENT CULTURES:    Meds: cefoTEtan  IVPB 2 Gram(s) IV Intermittent every 12 hours        ENDOCRINE  CAPILLARY BLOOD GLUCOSE      POCT Blood Glucose.: 303 mg/dL (10 Issa 2025 23:57)  POCT Blood Glucose.: 279 mg/dL (10 Issa 2025 22:05)  POCT Blood Glucose.: 266 mg/dL (10 Issa 2025 20:15)  POCT Blood Glucose.: 139 mg/dL (10 Issa 2025 12:09)  POCT Blood Glucose.: 117 mg/dL (10 Issa 2025 07:02)    Meds:       CODE STATUS: full code

## 2025-01-10 NOTE — CONSULT NOTE ADULT - ASSESSMENT
44 M with pmhx of HTN who initially presented after experiencing generalized weakness and confusion who was admitted to MICU for significant and persistent hypoglycemia c/f insulinoma treated with fluids, octreotide, diazoxide, now s/p distal pancreatectomy and splenectomy 1/10.    Neuro:  - AxOx 4, sleepy  - pain control with prn tylenol and dilaudid   - CTH showed no acute intracranial hemorrhage, mass effect, or midline shift.    Respiratory  - Extubated after surgery  - On NC, titrate down as tolerated    Cardiovascular  - No pressor requirements  - map goal >65    GI/Nutrition  - NPO with sips s/p surgery  - keep midline dressing s48 hours per primary team  - send drain amlyase in AM    /Renal  - LR @125  - driscoll in place  - monitor I&Os    Hematology  - chemical vte ppx held  - SCDs    ID  - cefotetan x1 dose post-op  - trend fevers, wbc  - patient afebrile, no leukocytosis     Endocrine  - s/p OR 1/10 for distal pancreatectomy iso insulinoma  - q2 FS checks  - appreciate endo recs    LINES:   - [facility] CVC  - L radial a-line  - driscoll   44 M with pmhx of HTN who initially presented after experiencing generalized weakness and confusion who was admitted to MICU for significant and persistent hypoglycemia c/f insulinoma treated with fluids, octreotide, diazoxide, now s/p robotic distal pancreatectomy and splenectomy 1/10.    Neuro:  - AxOx 4, sleepy  - pain control with prn tylenol and dilaudid   - CTH showed no acute intracranial hemorrhage, mass effect, or midline shift.    Respiratory  - Extubated after surgery  - On NC, titrate down as tolerated    Cardiovascular  - No pressor requirements  - map goal >65    GI/Nutrition  - NPO with sips s/p surgery  - keep midline dressing s48 hours per primary team  - send drain amlyase in AM    /Renal  - LR @125  - driscoll in place  - monitor I&Os    Hematology  - chemical vte ppx held  - SCDs    ID  - cefotetan x1 dose post-op  - trend fevers, wbc  - patient afebrile, no leukocytosis     Endocrine  - s/p OR 1/10 for distal pancreatectomy iso insulinoma  - q2 FS checks  - appreciate endo recs    LINES:   - [facility] CVC  - L radial a-line  - driscoll

## 2025-01-10 NOTE — PROGRESS NOTE ADULT - ASSESSMENT
Patient is a 44 year old male with past medical history of hypertension who presented to the hospital with hypoglycemia. 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    #Hypoglycemia secondary to insulinoma  - CT abd/pel with PANCREAS: Lobulated enhancing mass at the pancreatic tail measuring 4.3 x 3.7 cm, with abutment of the left kidney at the posterior margin. Additional hypoattenuating lesion in the pancreatic body measuring 2.1 x 1.8 cm.  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 There is also a 2.1 x 2.4 cm septated cystic lesion within the pancreatic body without appreciable solid component, which is nonspecific and could represent a mucinous cystadenoma, side branch intraductal papillary mucinous neoplasm (IPMN), or pseudocyst.  - Clinical picture highly suspicious for insulinoma given labs and imaging  - AM cortisol 12.6 1/4/25, rules out adrenal insufficiency  - Sulfonylurea panel negative  - Neuroendocrine tumor markers: Chromogranin 24.1, Gastrin 19 wnl    Recommendations:  - Patient pending surgical resection of proinsulinoma today. If BG normalized post surgery, will not need dextrose, diazoxide, and octreotide after surgery.   - Continue diazoxide max dose of 900 mg/day (300 mg TID). Titrate down dextrose fluids as permitted  - continue octreotide, increased by primary team to 300 q8h  - continue D20 infusion, titrate as permitted  - continue to check FSG q4h for now  - hypoglycemia protocol  - Outpatient genetic testing     #Elevated PTH  PTHi elevated to 199 with corrected calcium of 8.6, low phos 2.2  Vitamin D 25OH low at 12.7  Likely secondary hyperparathyroidism and less likely primary hyperparathyroid/parathyroid hyperplasia   - Start vitamin D repletion: 2000 IU daily  - Repeat vitamin D 25OH, PTH, CMP in around 6 weeks outpatient    #Thyroid nodule  Incidental finding on CT chest  US performed 1/7/25: Right thyroid nodule: --Nodule 1: - In the medial lower pole/isthmus, measuring 2.0 x 1.0 x 2.1   cm predominantly solid, hypoechoic, TIRADS 4. Moderate vascularity is demonstrated within this nodule with color Doppler.  PLAN:  - FNA indicated, can be done outpatient at office visit or inpatient to expedite care.     #HTN  Management per primary team, currently on amlodipine 7.5 mg qd        Endy Serrato MD  Endocrine Fellow  Can be reached via teams. For follow up questions, discharge recommendations, or new consults, please call answering service at [phone] (weekdays); [phone] (nights/weekends). Patient is a 44 year old male with past medical history of hypertension who presented to the hospital with hypoglycemia. 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    #Hypoglycemia secondary to insulinoma  - CT abd/pel with PANCREAS: Lobulated enhancing mass at the pancreatic tail measuring 4.3 x 3.7 cm, with abutment of the left kidney at the posterior margin. Additional hypoattenuating lesion in the pancreatic body measuring 2.1 x 1.8 cm.  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 There is also a 2.1 x 2.4 cm septated cystic lesion within the pancreatic body without appreciable solid component, which is nonspecific and could represent a mucinous cystadenoma, side branch intraductal papillary mucinous neoplasm (IPMN), or pseudocyst.  - Clinical picture highly suspicious for insulinoma given labs and imaging  - AM cortisol 12.6 1/4/25, rules out adrenal insufficiency  - Sulfonylurea panel negative  - Neuroendocrine tumor markers: Chromogranin 24.1, Gastrin 19 wnl    Recommendations:  - Patient pending surgical resection of proinsulinoma today. If BG normalized post surgery, will not need dextrose, diazoxide, and octreotide after surgery.   - Continue diazoxide max dose of 900 mg/day (300 mg TID). Titrate down dextrose fluids as permitted  - continue octreotide, increased by primary team to 300 q8h  - continue D20 infusion, titrate as permitted  - continue to check FSG q4h for now  - hypoglycemia protocol  - Outpatient genetic testing     #Elevated PTH  PTHi elevated to 199 with corrected calcium of 8.6, low phos 2.2  Vitamin D 25OH low at 12.7  Likely secondary hyperparathyroidism and less likely primary hyperparathyroid/parathyroid hyperplasia   - Start vitamin D repletion: 50,000 IU weekly x 4 weeks, followed by 2000 IU daily   - Repeat vitamin D 25OH, PTH, CMP in around 6 weeks outpatient    #Thyroid nodule  Incidental finding on CT chest  US performed 1/7/25: Right thyroid nodule: --Nodule 1: - In the medial lower pole/isthmus, measuring 2.0 x 1.0 x 2.1   cm predominantly solid, hypoechoic, TIRADS 4. Moderate vascularity is demonstrated within this nodule with color Doppler.  PLAN:  - FNA indicated, can be done outpatient at office visit or inpatient to expedite care.     #HTN  Management per primary team, currently on amlodipine 7.5 mg qd        Endy Serrato MD  Endocrine Fellow  Can be reached via teams. For follow up questions, discharge recommendations, or new consults, please call answering service at [phone] (weekdays); [phone] (nights/weekends).

## 2025-01-10 NOTE — BRIEF OPERATIVE NOTE - NSICDXBRIEFPROCEDURE_GEN_ALL_CORE_FT
PROCEDURES:  Pancreatectomy, distal, robot-assisted, with splenectomy 10-Issa-2025 20:10:50  Cal Patterson

## 2025-01-10 NOTE — PRE-ANESTHESIA EVALUATION ADULT - NSANTHVITALSIGNSFT_GEN_ALL_CORE
in MICU, alert, oriented. BOth upper extremities are very edematous. Lungs clear , heart RRR. central line in place

## 2025-01-10 NOTE — CHART NOTE - NSCHARTNOTEFT_GEN_A_CORE
MICU Transfer Note    Transfer from: MICU    Transfer to: (  ) Medicine    (  ) Telemetry     (   ) RCU        (    ) Palliative         (   ) Stroke Unit          ( x) SICU    Accepting Physician: SICU s/p pancreatectomy     Signout given to: Dr. Saint-Victor      HPI:HPI:  45 yo M with pmhx of HTN comes to the ED after experiencing confusion and weakness. Patient's symptoms started on 12/30 while he was away in [place]. He was experiencing 4 episodes of diarrhea at the time. However, he attributed his symptoms to gastroenteritis as his family members were experiencing similar symptoms. He returned to [place] and was found to be experiencing confusion as he was spraying the ceiling with water. He went to urgent care and was then recommended to come to the hospital. He has never experienced symptoms like this prior. His ROS otherwise is notable for a history of night sweats and he is unsure if he has experienced unintentional weight loss. Today, he is feeling better as his confusion and weakness has improved. His last episode of diarrhea was also the day prior to coming to the hospital.     In the ED, his vitals were wnl and his labs were significant for persistent hypoglycemia and hypoglycemia. A CT A/P was also obtained that showed pancreatic lesions s/p D50 and initiation of D5LR. He is now admitted for further management.  (02 Jan 2025 21:05)        MICU Course: Patient admitted to the MICU in the setting of persistent hypoglycemia requiring Q1 fingersticks. Due to hypoglycemia, patient started on d10 in 1 PIV, d10/NS in 2nd PIV. Despite fluids and adequate diet, patient frequently hypoglycemic requiring intermittent amps of d50. MRCP showing pancreatic tail mass likely a neuroendocrine tumor, with an additional cystic lesion in pancreatic body that could be IPMN. Endocrinology consulted for workup/treatment recommendations, surgery oncology consulted for possible resection, and advanced GI consulted for EUS for IPMN. Hypoglycemia and insulinoma workup sent showing elevated c-peptide and elevated insulin levels. During hospital course, patient started on diazoxide and octreotide which was increased in the setting of persistent hypoglycemia. Due to inadequate glucose delivery, patient had central line placed on 1/6 in order to receive d20. EUS done on 1/7 which showed mass at pancreatic tail and possible serous cystadenoma. On 1/10 patient had pancreatic resection and splenectomy at [facility]. After procedure, patient transferred to SICU service        Assessment/Plan:  44 M with pmhx of HTN who initially presented after experiencing generalized weakness and confusion who was admitted to MICU for significant and persistent hypoglycemia c/f insulinoma treated with fluids, octreotide, diazoxide, now planned for resection on 1/10    Neuro:  AOX 3, mental status at baseline. Initially lethargic 2/2 to hypoglycemia   CTH showed no acute intracranial hemorrhage, mass effect, or midline shift.  - CTM    Cardiovascular  # Hypertension  - at home on amlodipine 7.5mg daily   - can continue for now     Respiratory  # non-labored breathing, satting 95% on room air   - no active issues     GI/Nutrition  # Diarrhea   RESOLVED   3-4 days of watery diarrhea in [place] w/ close contacts w/ similar symptoms   likely viral gastroenteritis, less likely VIPoma given self resolving nature of diarrhea and close contacts with similar symptoms   - can consider GI PCR if diarrhea recurs     #Nutrition  - DASH, TLC  - NPO after midnight     /Renal  - no active issues     ID  - patient afebrile, no leukocytosis   - f/u urine cultures     Endocrine  #Hypoglycemia  #Insulinoma   CT abdomen and pelvis showing lesions in pancreatic tail along with persistent hypoglycemia c/f Insulinoma  Surg onc consulted   s/p central line d20 started  - resection planned for 1/10 per surg nc  - NPO after midnight w/ pre-op labs   - c/w d20 + d10/NS  - Diazoxide 300mg q8h per endo   - Q6h FS  - Octreotide to 300mg qd + every 6 hours prn  - f/u hypoglycemic labs   - endocrine following, recs appreciated       #Thyroid nodule  TIRADS 4  - advised patient to f/u outpatient for FNA     #Elevated parathyroid  ionized ca wnl  vitamin D low   - f/u endocrinology recs          Hematology   #LUE swelling  - LUE duplex to r/o DVT   - CTM     #DVT ppx   - Lovenox     Oncology  #pancreatic tail mass  #insulinoma   CT Abdomen and pelvis showing Lobulated 4.3 cm enhancing pancreatic tail mass, suspicious for a neuroendocrine tumor given the patient's clinical history. Additional 2.1 cm indeterminate hypoattenuating lesion in the pancreatic body.   MR abdomen and MRCP ordered for further Consider contrast enhanced MRI of the abdomen/MRCP for further evaluation.  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

## 2025-01-10 NOTE — PROGRESS NOTE ADULT - SUBJECTIVE AND OBJECTIVE BOX
MICU Progress Note    SUBJECTIVE  INTERVAL EVENTS:  - NAEON    OBJECTIVE  Physical Exam:   PHYSICAL EXAM:  GENERAL: No acute distress, well-developed  HEAD:  Atraumatic, Normocephalic  EYES: EOMI, PERRLA, conjunctiva and sclera clear  NECK: Supple, no lymphadenopathy, no JVD  CHEST/LUNG: CTAB; No wheezes, rales, or rhonchi  HEART: Regular rate and rhythm. Normal S1/S2. No murmurs, rubs, or gallops  ABDOMEN: Soft, non-tender, non-distended; normal bowel sounds, no organomegaly  EXTREMITIES:  2+ peripheral pulses b/l, No clubbing, cyanosis, or edema  NEUROLOGY: A&O x 3, no focal deficits  SKIN: No rashes or lesions    ICU Vital Signs Last 24 Hrs  T(F): 97.5 (10 Issa 2025 04:00), Max: 98.3 (09 Jan 2025 12:00)  HR: 101 (10 Issa 2025 05:00) (94 - 104)  BP: 145/63 (10 Issa 2025 05:00) (89/55 - 170/79)  BP(mean): 90 (10 Issa 2025 05:00) (68 - 113)  ABP: --  ABP(mean): --  RR: 17 (10 Issa 2025 05:00) (15 - 35)  SpO2: 94% (10 Issa 2025 05:00) (69% - 98%)    I&O's Summary    09 Jan 2025 07:01  -  10 Issa 2025 07:00  --------------------------------------------------------  IN: 3975 mL / OUT: 1120 mL / NET: 2855 mL        ECMO Day#     Adult Advanced Hemodynamics Last 24 Hrs  CVP(mm Hg): --  CVP(cm H2O): --  CO: --  CI: --  PA: --  PA(mean): --  PCWP: --  SVR: --  SVRI: --  PVR: --  PVRI: --    ECMO SETTINGS:  Type:		[ ] Venovenous		[ ] Venoarterial  Cannulation Site(s):     Flow:  	        RPM: 		    Oxygenator:	Sweep:     L/min	FiO2:        LABS:                        10.3   5.95  )-----------( 273      ( 10 Issa 2025 00:59 )             34.8     01-10    140  |  104  |  11  ----------------------------<  133[H]  3.5   |  24  |  0.90    Ca    8.5      10 Issa 2025 00:59  Phos  3.1     01-10  Mg     1.9     01-10    TPro  6.3  /  Alb  3.3  /  TBili  0.3  /  DBili  x   /  AST  8[L]  /  ALT  10  /  AlkPhos  65  01-10    PT/INR - ( 10 Issa 2025 00:59 )   PT: 12.6 sec;   INR: 1.10 ratio         PTT - ( 10 Issa 2025 00:59 )  PTT:30.5 sec      Venous: 01-10-25 @ 00:46 FiO2: 21.0 Oxygen Sat% 89.8    Urinalysis Basic - ( 10 Issa 2025 00:59 )    Color: x / Appearance: x / SG: x / pH: x  Gluc: 133 mg/dL / Ketone: x  / Bili: x / Urobili: x   Blood: x / Protein: x / Nitrite: x   Leuk Esterase: x / RBC: x / WBC x   Sq Epi: x / Non Sq Epi: x / Bacteria: x          CAPILLARY BLOOD GLUCOSE      POCT Blood Glucose.: 117 mg/dL (10 Issa 2025 07:02)  POCT Blood Glucose.: 137 mg/dL (09 Jan 2025 17:17)  POCT Blood Glucose.: 118 mg/dL (09 Jan 2025 13:53)  POCT Blood Glucose.: 107 mg/dL (09 Jan 2025 13:48)  POCT Blood Glucose.: 160 mg/dL (09 Jan 2025 13:47)        RADIOLOGY & ADDITIONAL TESTS:  Other Labs  Imaging Personally Reviewed: No interval imaging  Electrocardiogram Personally Reviewed: No interval imaging    Medications and Allergies:   MEDICATIONS  (STANDING):  amLODIPine   Tablet 7.5 milliGRAM(s) Oral daily  chlorhexidine 2% Cloths 1 Application(s) Topical <User Schedule>  dextrose 10% + sodium chloride 0.9%. 1000 milliLiter(s) (75 mL/Hr) IV Continuous <Continuous>  dextrose 20%. 500 milliLiter(s) (50 mL/Hr) IV Continuous <Continuous>  diazoxide Suspension 300 milliGRAM(s) Oral every 8 hours  fluticasone propionate 50 MICROgram(s)/spray Nasal Spray 1 Spray(s) Both Nostrils two times a day  octreotide  Injectable 300 MICROGram(s) IV Push every 8 hours  polyethylene glycol 3350 17 Gram(s) Oral daily    MEDICATIONS  (PRN):  acetaminophen     Tablet .. 650 milliGRAM(s) Oral every 6 hours PRN Mild Pain (1 - 3)  simethicone 80 milliGRAM(s) Chew four times a day PRN Upset Stomach  sodium chloride 0.65% Nasal 1 Spray(s) Both Nostrils three times a day PRN Nasal Congestion  sodium chloride 0.9% lock flush 10 milliLiter(s) IV Push every 1 hour PRN Pre/post blood products, medications, blood draw, and to maintain line patency      Antimicrobials            Allergies    No Known Allergies    Intolerances

## 2025-01-10 NOTE — PROGRESS NOTE ADULT - SUBJECTIVE AND OBJECTIVE BOX
Surgery Daily Progress Note    24 Hour/ Interval Events:  - No acute overnight events.     Subjective and Interval:  Seen and examined at bedside. Afebrile.   ___________________________________________________  Vital Signs  T(C): 36.8 (16:00), Max: 36.8 (04:00)  HR: 99 (21:00) (95 - 104)  BP: 123/57 (21:00) (103/52 - 170/79)  ABP: --  ABP(mean): --  RR: 16 (21:00) (15 - 35)  SpO2: 95% (21:00) (69% - 98%)    Physical Exam  General: WN/WD NAD  Neurology: A&Ox3, nonfocal, follows commands  Respiratory: CTA B/L  CV: Normal rate regular rhythm  Abdominal: Soft, NT, ND  Extremities: No edema    In&Out    01-08-25 @ 07:01  -  01-09-25 @ 07:00  --------------------------------------------------------  IN: 4820 mL / OUT: 1900 mL / NET: 2920 mL    01-09-25 @ 07:01  -  01-10-25 @ 03:12  --------------------------------------------------------  IN: 3600 mL / OUT: 1120 mL / NET: 2480 mL        Labs    LABS:  cret                        10.3   5.95  )-----------( 273      ( 10 Issa 2025 00:59 )             34.8     01-10    140  |  104  |  11  ----------------------------<  133[H]  3.5   |  24  |  0.90    Ca    8.5      10 Issa 2025 00:59  Phos  3.1     01-10  Mg     1.9     01-10    TPro  6.3  /  Alb  3.3  /  TBili  0.3  /  DBili  x   /  AST  8[L]  /  ALT  10  /  AlkPhos  65  01-10    PT/INR - ( 10 Issa 2025 00:59 )   PT: 12.6 sec;   INR: 1.10 ratio         PTT - ( 10 Issa 2025 00:59 )  PTT:30.5 sec      Medications  acetaminophen     Tablet .. 650 milliGRAM(s) Oral every 6 hours PRN  amLODIPine   Tablet 7.5 milliGRAM(s) Oral daily  chlorhexidine 2% Cloths 1 Application(s) Topical <User Schedule>  dextrose 10% + sodium chloride 0.9%. 1000 milliLiter(s) IV Continuous <Continuous>  dextrose 20%. 500 milliLiter(s) IV Continuous <Continuous>  diazoxide Suspension 300 milliGRAM(s) Oral every 8 hours  fluticasone propionate 50 MICROgram(s)/spray Nasal Spray 1 Spray(s) Both Nostrils two times a day  octreotide  Injectable 300 MICROGram(s) IV Push every 8 hours  polyethylene glycol 3350 17 Gram(s) Oral daily  simethicone 80 milliGRAM(s) Chew four times a day PRN  sodium chloride 0.65% Nasal 1 Spray(s) Both Nostrils three times a day PRN  sodium chloride 0.9% lock flush 10 milliLiter(s) IV Push every 1 hour PRN      ______________________________________________  Assessment & Plan    44 M PMH HTN who initially presented after experiencing generalized weakness and confusion who was admitted to MICU for significant and persistent hypoglycemia. CT showing 4x3cm lesion in tail of pancreas and 2x2cm lesion in body. Patient with persistent hypoglycemia despite D10 administration. Patient also with elevated c-peptide of 5.6. 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    Plan:   - High suspicion for insulinoma in tail of panc, plan for resection 01/10 at [facility] with Dr. Woods  - Appreciate EUS results and will f/u tissue path  - Distal pancreatectomy 01/10   -Diet: NPO  -F/U AM pre=op labs     [facility], [id number]        Surgery Daily Progress Note    24 Hour/ Interval Events:  - No acute overnight events.     Subjective and Interval:  Seen and examined at bedside. Afebrile. NPO and preop.   ___________________________________________________  Vital Signs  T(C): 36.8 (16:00), Max: 36.8 (04:00)  HR: 99 (21:00) (95 - 104)  BP: 123/57 (21:00) (103/52 - 170/79)  ABP: --  ABP(mean): --  RR: 16 (21:00) (15 - 35)  SpO2: 95% (21:00) (69% - 98%)    Physical Exam  General: WN/WD NAD  Neurology: A&Ox3, nonfocal, follows commands  Respiratory: CTA B/L  CV: Normal rate regular rhythm  Abdominal: Soft, NT, ND  Extremities: No edema    In&Out    01-08-25 @ 07:01  -  01-09-25 @ 07:00  --------------------------------------------------------  IN: 4820 mL / OUT: 1900 mL / NET: 2920 mL    01-09-25 @ 07:01  -  01-10-25 @ 03:12  --------------------------------------------------------  IN: 3600 mL / OUT: 1120 mL / NET: 2480 mL    LABS:  cret                        10.3   5.95  )-----------( 273      ( 10 Issa 2025 00:59 )             34.8     01-10    140  |  104  |  11  ----------------------------<  133[H]  3.5   |  24  |  0.90    Ca    8.5      10 Issa 2025 00:59  Phos  3.1     01-10  Mg     1.9     01-10    TPro  6.3  /  Alb  3.3  /  TBili  0.3  /  DBili  x   /  AST  8[L]  /  ALT  10  /  AlkPhos  65  01-10    PT/INR - ( 10 Issa 2025 00:59 )   PT: 12.6 sec;   INR: 1.10 ratio         PTT - ( 10 Issa 2025 00:59 )  PTT:30.5 sec      Medications  acetaminophen     Tablet .. 650 milliGRAM(s) Oral every 6 hours PRN  amLODIPine   Tablet 7.5 milliGRAM(s) Oral daily  chlorhexidine 2% Cloths 1 Application(s) Topical <User Schedule>  dextrose 10% + sodium chloride 0.9%. 1000 milliLiter(s) IV Continuous <Continuous>  dextrose 20%. 500 milliLiter(s) IV Continuous <Continuous>  diazoxide Suspension 300 milliGRAM(s) Oral every 8 hours  fluticasone propionate 50 MICROgram(s)/spray Nasal Spray 1 Spray(s) Both Nostrils two times a day  octreotide  Injectable 300 MICROGram(s) IV Push every 8 hours  polyethylene glycol 3350 17 Gram(s) Oral daily  simethicone 80 milliGRAM(s) Chew four times a day PRN  sodium chloride 0.65% Nasal 1 Spray(s) Both Nostrils three times a day PRN  sodium chloride 0.9% lock flush 10 milliLiter(s) IV Push every 1 hour PRN      ______________________________________________  Assessment & Plan    44 M PMH HTN who initially presented after experiencing generalized weakness and confusion who was admitted to MICU for significant and persistent hypoglycemia. CT showing 4x3cm lesion in tail of pancreas and 2x2cm lesion in body. Patient with persistent hypoglycemia despite D10 administration. Patient also with elevated c-peptide of 5.6. 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    Plan:   - Robotic Distal pancreatectomy poss splenectomy 01/10  at [facility] with Dr. Woods  -Diet: NPO  - Will obtain consent    [facility], [id number]

## 2025-01-10 NOTE — PROGRESS NOTE ADULT - SUBJECTIVE AND OBJECTIVE BOX
Admitted for: Hypoglycemia        Following for: Insulinoma    Subjective:   Patient seen at bedside along with family members. Patient awaiting surgery.      MEDICATIONS  (STANDING):  amLODIPine   Tablet 7.5 milliGRAM(s) Oral daily  chlorhexidine 2% Cloths 1 Application(s) Topical <User Schedule>  dextrose 10% + sodium chloride 0.9%. 1000 milliLiter(s) (75 mL/Hr) IV Continuous <Continuous>  dextrose 20%. 500 milliLiter(s) (50 mL/Hr) IV Continuous <Continuous>  diazoxide Suspension 300 milliGRAM(s) Oral every 8 hours  fluticasone propionate 50 MICROgram(s)/spray Nasal Spray 1 Spray(s) Both Nostrils two times a day  octreotide  Injectable 300 MICROGram(s) IV Push every 8 hours  polyethylene glycol 3350 17 Gram(s) Oral daily    MEDICATIONS  (PRN):  acetaminophen     Tablet .. 650 milliGRAM(s) Oral every 6 hours PRN Mild Pain (1 - 3)  simethicone 80 milliGRAM(s) Chew four times a day PRN Upset Stomach  sodium chloride 0.65% Nasal 1 Spray(s) Both Nostrils three times a day PRN Nasal Congestion  sodium chloride 0.9% lock flush 10 milliLiter(s) IV Push every 1 hour PRN Pre/post blood products, medications, blood draw, and to maintain line patency      Allergies    No Known Allergies    Intolerances          PHYSICAL EXAM:  VITALS: T(C): 37 (01-10-25 @ 12:00)  T(F): 98.6 (01-10-25 @ 12:00), Max: 98.6 (01-10-25 @ 12:00)  HR: 99 (01-10-25 @ 12:00) (92 - 103)  BP: 125/57 (01-10-25 @ 12:00) (89/55 - 145/63)  RR:  (15 - 21)  SpO2:  (92% - 98%)  Wt(kg): --  GENERAL: NAD, obese  EYES: No proptosis, no lid lag, anicteric  RESPIRATORY: No respiratory distress, normal effort  CARDIOVASCULAR: no edema  GI: non distended  EXT: SAUCEDO  PSYCH: Alert and oriented x 3, reactive affect      A1C with Estimated Average Glucose Result: 4.9 % (01-03-25 @ 06:20)      POCT Blood Glucose.: 139 mg/dL (01-10-25 @ 12:09)  POCT Blood Glucose.: 117 mg/dL (01-10-25 @ 07:02)  POCT Blood Glucose.: 137 mg/dL (01-09-25 @ 17:17)  POCT Blood Glucose.: 118 mg/dL (01-09-25 @ 13:53)  POCT Blood Glucose.: 107 mg/dL (01-09-25 @ 13:48)  POCT Blood Glucose.: 160 mg/dL (01-09-25 @ 13:47)  POCT Blood Glucose.: 106 mg/dL (01-09-25 @ 05:50)  POCT Blood Glucose.: 123 mg/dL (01-08-25 @ 17:28)  POCT Blood Glucose.: 85 mg/dL (01-08-25 @ 12:14)  POCT Blood Glucose.: 91 mg/dL (01-08-25 @ 05:59)  POCT Blood Glucose.: 103 mg/dL (01-07-25 @ 23:15)  POCT Blood Glucose.: 108 mg/dL (01-07-25 @ 17:00)  POCT Blood Glucose.: 105 mg/dL (01-07-25 @ 14:52)      01-10    140  |  104  |  11  ----------------------------<  133[H]  3.5   |  24  |  0.90    eGFR: 109    Ca    8.5      01-10  Mg     1.9     01-10  Phos  3.1     01-10    TPro  6.3  /  Alb  3.3  /  TBili  0.3  /  DBili  x   /  AST  8[L]  /  ALT  10  /  AlkPhos  65  01-10      Thyroid Function Tests:  01-03 @ 04:55 TSH 3.50 FreeT4 1.3 T3 -- Anti TPO -- Anti Thyroglobulin Ab -- TSI --  01-02 @ 15:54 TSH 1.60 FreeT4 -- T3 -- Anti TPO -- Anti Thyroglobulin Ab -- TSI --

## 2025-01-11 LAB
ALBUMIN SERPL ELPH-MCNC: 3 G/DL — LOW (ref 3.3–5)
ALBUMIN SERPL ELPH-MCNC: 3.2 G/DL — LOW (ref 3.3–5)
ALP SERPL-CCNC: 67 U/L — SIGNIFICANT CHANGE UP (ref 40–120)
ALP SERPL-CCNC: 71 U/L — SIGNIFICANT CHANGE UP (ref 40–120)
ALT FLD-CCNC: 27 U/L — SIGNIFICANT CHANGE UP (ref 10–45)
ALT FLD-CCNC: 31 U/L — SIGNIFICANT CHANGE UP (ref 10–45)
AMYLASE FLD-CCNC: 692 U/L — SIGNIFICANT CHANGE UP
ANION GAP SERPL CALC-SCNC: 12 MMOL/L — SIGNIFICANT CHANGE UP (ref 5–17)
ANION GAP SERPL CALC-SCNC: 14 MMOL/L — SIGNIFICANT CHANGE UP (ref 5–17)
APTT BLD: 28.1 SEC — SIGNIFICANT CHANGE UP (ref 24.5–35.6)
AST SERPL-CCNC: 23 U/L — SIGNIFICANT CHANGE UP (ref 10–40)
AST SERPL-CCNC: 29 U/L — SIGNIFICANT CHANGE UP (ref 10–40)
BASOPHILS # BLD AUTO: 0.04 K/UL — SIGNIFICANT CHANGE UP (ref 0–0.2)
BASOPHILS # BLD AUTO: 0.07 K/UL — SIGNIFICANT CHANGE UP (ref 0–0.2)
BASOPHILS NFR BLD AUTO: 0.3 % — SIGNIFICANT CHANGE UP (ref 0–2)
BASOPHILS NFR BLD AUTO: 0.7 % — SIGNIFICANT CHANGE UP (ref 0–2)
BILIRUB SERPL-MCNC: 0.4 MG/DL — SIGNIFICANT CHANGE UP (ref 0.2–1.2)
BILIRUB SERPL-MCNC: 0.4 MG/DL — SIGNIFICANT CHANGE UP (ref 0.2–1.2)
BUN SERPL-MCNC: 12 MG/DL — SIGNIFICANT CHANGE UP (ref 7–23)
BUN SERPL-MCNC: 12 MG/DL — SIGNIFICANT CHANGE UP (ref 7–23)
C PEPTIDE SERPL-MCNC: 1.6 NG/ML — SIGNIFICANT CHANGE UP (ref 1.1–4.4)
C PEPTIDE SERPL-MCNC: 1.7 NG/ML — SIGNIFICANT CHANGE UP (ref 1.1–4.4)
CALCIUM SERPL-MCNC: 8.3 MG/DL — LOW (ref 8.4–10.5)
CALCIUM SERPL-MCNC: 8.4 MG/DL — SIGNIFICANT CHANGE UP (ref 8.4–10.5)
CHLORIDE SERPL-SCNC: 101 MMOL/L — SIGNIFICANT CHANGE UP (ref 96–108)
CHLORIDE SERPL-SCNC: 103 MMOL/L — SIGNIFICANT CHANGE UP (ref 96–108)
CO2 SERPL-SCNC: 22 MMOL/L — SIGNIFICANT CHANGE UP (ref 22–31)
CO2 SERPL-SCNC: 25 MMOL/L — SIGNIFICANT CHANGE UP (ref 22–31)
CORTICOSTEROID BINDING GLOBULIN RESULT: 1.6 MG/DL — LOW
CORTIS F/TOTAL MFR SERPL: 47 % — SIGNIFICANT CHANGE UP
CORTIS SERPL-MCNC: 20 UG/DL — HIGH
CORTISOL, FREE RESULT: 9.4 UG/DL — HIGH
CREAT SERPL-MCNC: 0.77 MG/DL — SIGNIFICANT CHANGE UP (ref 0.5–1.3)
CREAT SERPL-MCNC: 0.81 MG/DL — SIGNIFICANT CHANGE UP (ref 0.5–1.3)
EGFR: 112 ML/MIN/1.73M2 — SIGNIFICANT CHANGE UP
EGFR: 114 ML/MIN/1.73M2 — SIGNIFICANT CHANGE UP
EOSINOPHIL # BLD AUTO: 0 K/UL — SIGNIFICANT CHANGE UP (ref 0–0.5)
EOSINOPHIL # BLD AUTO: 0.14 K/UL — SIGNIFICANT CHANGE UP (ref 0–0.5)
EOSINOPHIL NFR BLD AUTO: 0 % — SIGNIFICANT CHANGE UP (ref 0–6)
EOSINOPHIL NFR BLD AUTO: 1.4 % — SIGNIFICANT CHANGE UP (ref 0–6)
GAS PNL BLDA: SIGNIFICANT CHANGE UP
GLUCOSE BLDC GLUCOMTR-MCNC: 189 MG/DL — HIGH (ref 70–99)
GLUCOSE BLDC GLUCOMTR-MCNC: 217 MG/DL — HIGH (ref 70–99)
GLUCOSE BLDC GLUCOMTR-MCNC: 224 MG/DL — HIGH (ref 70–99)
GLUCOSE BLDC GLUCOMTR-MCNC: 226 MG/DL — HIGH (ref 70–99)
GLUCOSE BLDC GLUCOMTR-MCNC: 294 MG/DL — HIGH (ref 70–99)
GLUCOSE SERPL-MCNC: 186 MG/DL — HIGH (ref 70–99)
GLUCOSE SERPL-MCNC: 273 MG/DL — HIGH (ref 70–99)
HCT VFR BLD CALC: 34.9 % — LOW (ref 39–50)
HCT VFR BLD CALC: 36.2 % — LOW (ref 39–50)
HGB BLD-MCNC: 10.4 G/DL — LOW (ref 13–17)
HGB BLD-MCNC: 10.8 G/DL — LOW (ref 13–17)
IMM GRANULOCYTES NFR BLD AUTO: 0.4 % — SIGNIFICANT CHANGE UP (ref 0–0.9)
IMM GRANULOCYTES NFR BLD AUTO: 0.7 % — SIGNIFICANT CHANGE UP (ref 0–0.9)
INR BLD: 1.17 RATIO — HIGH (ref 0.85–1.16)
LYMPHOCYTES # BLD AUTO: 1.28 K/UL — SIGNIFICANT CHANGE UP (ref 1–3.3)
LYMPHOCYTES # BLD AUTO: 1.58 K/UL — SIGNIFICANT CHANGE UP (ref 1–3.3)
LYMPHOCYTES # BLD AUTO: 11.1 % — LOW (ref 13–44)
LYMPHOCYTES # BLD AUTO: 15.6 % — SIGNIFICANT CHANGE UP (ref 13–44)
MAGNESIUM SERPL-MCNC: 2 MG/DL — SIGNIFICANT CHANGE UP (ref 1.6–2.6)
MAGNESIUM SERPL-MCNC: 2 MG/DL — SIGNIFICANT CHANGE UP (ref 1.6–2.6)
MCHC RBC-ENTMCNC: 19.9 PG — LOW (ref 27–34)
MCHC RBC-ENTMCNC: 20.5 PG — LOW (ref 27–34)
MCHC RBC-ENTMCNC: 29.8 G/DL — LOW (ref 32–36)
MCHC RBC-ENTMCNC: 29.8 G/DL — LOW (ref 32–36)
MCV RBC AUTO: 66.7 FL — LOW (ref 80–100)
MCV RBC AUTO: 68.7 FL — LOW (ref 80–100)
MONOCYTES # BLD AUTO: 0.89 K/UL — SIGNIFICANT CHANGE UP (ref 0–0.9)
MONOCYTES # BLD AUTO: 1.05 K/UL — HIGH (ref 0–0.9)
MONOCYTES NFR BLD AUTO: 10.4 % — SIGNIFICANT CHANGE UP (ref 2–14)
MONOCYTES NFR BLD AUTO: 7.7 % — SIGNIFICANT CHANGE UP (ref 2–14)
NEUTROPHILS # BLD AUTO: 7.23 K/UL — SIGNIFICANT CHANGE UP (ref 1.8–7.4)
NEUTROPHILS # BLD AUTO: 9.25 K/UL — HIGH (ref 1.8–7.4)
NEUTROPHILS NFR BLD AUTO: 71.2 % — SIGNIFICANT CHANGE UP (ref 43–77)
NEUTROPHILS NFR BLD AUTO: 80.5 % — HIGH (ref 43–77)
NRBC # BLD: 0 /100 WBCS — SIGNIFICANT CHANGE UP (ref 0–0)
NRBC # BLD: 0 /100 WBCS — SIGNIFICANT CHANGE UP (ref 0–0)
PHOSPHATE SERPL-MCNC: 3.2 MG/DL — SIGNIFICANT CHANGE UP (ref 2.5–4.5)
PHOSPHATE SERPL-MCNC: 4 MG/DL — SIGNIFICANT CHANGE UP (ref 2.5–4.5)
PLATELET # BLD AUTO: 324 K/UL — SIGNIFICANT CHANGE UP (ref 150–400)
PLATELET # BLD AUTO: 338 K/UL — SIGNIFICANT CHANGE UP (ref 150–400)
POTASSIUM SERPL-MCNC: 4.3 MMOL/L — SIGNIFICANT CHANGE UP (ref 3.5–5.3)
POTASSIUM SERPL-MCNC: 4.5 MMOL/L — SIGNIFICANT CHANGE UP (ref 3.5–5.3)
POTASSIUM SERPL-SCNC: 4.3 MMOL/L — SIGNIFICANT CHANGE UP (ref 3.5–5.3)
POTASSIUM SERPL-SCNC: 4.5 MMOL/L — SIGNIFICANT CHANGE UP (ref 3.5–5.3)
PROT SERPL-MCNC: 6.4 G/DL — SIGNIFICANT CHANGE UP (ref 6–8.3)
PROT SERPL-MCNC: 6.4 G/DL — SIGNIFICANT CHANGE UP (ref 6–8.3)
PROTHROM AB SERPL-ACNC: 13.3 SEC — SIGNIFICANT CHANGE UP (ref 9.9–13.4)
RBC # BLD: 5.23 M/UL — SIGNIFICANT CHANGE UP (ref 4.2–5.8)
RBC # BLD: 5.27 M/UL — SIGNIFICANT CHANGE UP (ref 4.2–5.8)
RBC # FLD: 16.3 % — HIGH (ref 10.3–14.5)
RBC # FLD: 16.5 % — HIGH (ref 10.3–14.5)
SODIUM SERPL-SCNC: 137 MMOL/L — SIGNIFICANT CHANGE UP (ref 135–145)
SODIUM SERPL-SCNC: 140 MMOL/L — SIGNIFICANT CHANGE UP (ref 135–145)
WBC # BLD: 10.14 K/UL — SIGNIFICANT CHANGE UP (ref 3.8–10.5)
WBC # BLD: 11.51 K/UL — HIGH (ref 3.8–10.5)
WBC # FLD AUTO: 10.14 K/UL — SIGNIFICANT CHANGE UP (ref 3.8–10.5)
WBC # FLD AUTO: 11.51 K/UL — HIGH (ref 3.8–10.5)

## 2025-01-11 PROCEDURE — 99233 SBSQ HOSP IP/OBS HIGH 50: CPT

## 2025-01-11 PROCEDURE — 99233 SBSQ HOSP IP/OBS HIGH 50: CPT | Mod: GC

## 2025-01-11 PROCEDURE — 71045 X-RAY EXAM CHEST 1 VIEW: CPT | Mod: 26

## 2025-01-11 RX ORDER — ENOXAPARIN SODIUM 60 MG/.6ML
40 INJECTION INTRAVENOUS; SUBCUTANEOUS EVERY 24 HOURS
Refills: 0 | Status: DISCONTINUED | OUTPATIENT
Start: 2025-01-11 | End: 2025-01-14

## 2025-01-11 RX ORDER — INSULIN GLARGINE-YFGN 100 [IU]/ML
10 INJECTION, SOLUTION SUBCUTANEOUS AT BEDTIME
Refills: 0 | Status: DISCONTINUED | OUTPATIENT
Start: 2025-01-11 | End: 2025-01-14

## 2025-01-11 RX ORDER — INSULIN LISPRO 100/ML
VIAL (ML) SUBCUTANEOUS
Refills: 0 | Status: DISCONTINUED | OUTPATIENT
Start: 2025-01-11 | End: 2025-01-11

## 2025-01-11 RX ORDER — INSULIN LISPRO 100/ML
VIAL (ML) SUBCUTANEOUS EVERY 4 HOURS
Refills: 0 | Status: DISCONTINUED | OUTPATIENT
Start: 2025-01-11 | End: 2025-01-11

## 2025-01-11 RX ORDER — INSULIN LISPRO 100/ML
VIAL (ML) SUBCUTANEOUS AT BEDTIME
Refills: 0 | Status: DISCONTINUED | OUTPATIENT
Start: 2025-01-11 | End: 2025-01-14

## 2025-01-11 RX ORDER — SIMETHICONE 125 MG/1
80 CAPSULE, LIQUID FILLED ORAL ONCE
Refills: 0 | Status: COMPLETED | OUTPATIENT
Start: 2025-01-11 | End: 2025-01-11

## 2025-01-11 RX ORDER — CHLORHEXIDINE GLUCONATE 1.2 MG/ML
1 RINSE ORAL
Refills: 0 | Status: DISCONTINUED | OUTPATIENT
Start: 2025-01-11 | End: 2025-01-11

## 2025-01-11 RX ORDER — INSULIN LISPRO 100/ML
VIAL (ML) SUBCUTANEOUS
Refills: 0 | Status: DISCONTINUED | OUTPATIENT
Start: 2025-01-11 | End: 2025-01-14

## 2025-01-11 RX ORDER — INSULIN GLARGINE-YFGN 100 [IU]/ML
5 INJECTION, SOLUTION SUBCUTANEOUS AT BEDTIME
Refills: 0 | Status: DISCONTINUED | OUTPATIENT
Start: 2025-01-11 | End: 2025-01-11

## 2025-01-11 RX ORDER — SODIUM CHLORIDE 9 MG/ML
1000 INJECTION, SOLUTION INTRAVENOUS ONCE
Refills: 0 | Status: COMPLETED | OUTPATIENT
Start: 2025-01-11 | End: 2025-01-11

## 2025-01-11 RX ORDER — INSULIN LISPRO 100/ML
VIAL (ML) SUBCUTANEOUS AT BEDTIME
Refills: 0 | Status: DISCONTINUED | OUTPATIENT
Start: 2025-01-11 | End: 2025-01-11

## 2025-01-11 RX ADMIN — ACETAMINOPHEN 400 MILLIGRAM(S): 80 SOLUTION/ DROPS ORAL at 13:34

## 2025-01-11 RX ADMIN — SIMETHICONE 80 MILLIGRAM(S): 125 CAPSULE, LIQUID FILLED ORAL at 02:19

## 2025-01-11 RX ADMIN — ACETAMINOPHEN 1000 MILLIGRAM(S): 80 SOLUTION/ DROPS ORAL at 06:36

## 2025-01-11 RX ADMIN — SODIUM CHLORIDE 125 MILLILITER(S): 9 INJECTION, SOLUTION INTRAVENOUS at 09:56

## 2025-01-11 RX ADMIN — ACETAMINOPHEN 1000 MILLIGRAM(S): 80 SOLUTION/ DROPS ORAL at 14:00

## 2025-01-11 RX ADMIN — Medication 1: at 09:57

## 2025-01-11 RX ADMIN — Medication 100 GRAM(S): at 12:16

## 2025-01-11 RX ADMIN — Medication 10 MILLIGRAM(S): at 20:44

## 2025-01-11 RX ADMIN — Medication 10 MILLIGRAM(S): at 15:30

## 2025-01-11 RX ADMIN — SODIUM CHLORIDE 84 MILLILITER(S): 9 INJECTION, SOLUTION INTRAVENOUS at 13:11

## 2025-01-11 RX ADMIN — ACETAMINOPHEN 1000 MILLIGRAM(S): 80 SOLUTION/ DROPS ORAL at 22:29

## 2025-01-11 RX ADMIN — SIMETHICONE 80 MILLIGRAM(S): 125 CAPSULE, LIQUID FILLED ORAL at 05:51

## 2025-01-11 RX ADMIN — Medication 2: at 16:30

## 2025-01-11 RX ADMIN — Medication 5 MILLIGRAM(S): at 13:34

## 2025-01-11 RX ADMIN — ACETAMINOPHEN 400 MILLIGRAM(S): 80 SOLUTION/ DROPS ORAL at 05:36

## 2025-01-11 RX ADMIN — Medication 2: at 05:52

## 2025-01-11 RX ADMIN — Medication 5 MILLIGRAM(S): at 17:24

## 2025-01-11 RX ADMIN — Medication 2: at 22:00

## 2025-01-11 RX ADMIN — SODIUM CHLORIDE 1000 MILLILITER(S): 9 INJECTION, SOLUTION INTRAVENOUS at 06:13

## 2025-01-11 RX ADMIN — Medication 5 MILLIGRAM(S): at 14:00

## 2025-01-11 RX ADMIN — INSULIN GLARGINE-YFGN 10 UNIT(S): 100 INJECTION, SOLUTION SUBCUTANEOUS at 22:00

## 2025-01-11 RX ADMIN — LIDOCAINE 1 PATCH: 50 OINTMENT TOPICAL at 11:00

## 2025-01-11 RX ADMIN — ENOXAPARIN SODIUM 40 MILLIGRAM(S): 60 INJECTION INTRAVENOUS; SUBCUTANEOUS at 12:16

## 2025-01-11 RX ADMIN — ACETAMINOPHEN 400 MILLIGRAM(S): 80 SOLUTION/ DROPS ORAL at 21:59

## 2025-01-11 RX ADMIN — Medication 5 MILLIGRAM(S): at 05:36

## 2025-01-11 RX ADMIN — CHLORHEXIDINE GLUCONATE 1 APPLICATION(S): 1.2 RINSE ORAL at 05:36

## 2025-01-11 RX ADMIN — Medication 5 MILLIGRAM(S): at 06:36

## 2025-01-11 RX ADMIN — Medication 10 MILLIGRAM(S): at 19:44

## 2025-01-11 RX ADMIN — LIDOCAINE 1 PATCH: 50 OINTMENT TOPICAL at 07:50

## 2025-01-11 RX ADMIN — Medication 10 MILLIGRAM(S): at 15:02

## 2025-01-11 RX ADMIN — Medication 3: at 02:39

## 2025-01-11 NOTE — PROGRESS NOTE ADULT - SUBJECTIVE AND OBJECTIVE BOX
Admitted for: Hypoglycemia    Following for: Insulinoma    Subjective:   Patient seen at bedside along with sister.  Pt is in pain at the surgical site.  Clear liquid has been started and tray at bedside, but pt has not tried to eat anything yet.  Rn at bedside, aware of pain.     MEDICATIONS  (STANDING):  amLODIPine   Tablet 7.5 milliGRAM(s) Oral daily  chlorhexidine 2% Cloths 1 Application(s) Topical <User Schedule>  dextrose 10% + sodium chloride 0.9%. 1000 milliLiter(s) (75 mL/Hr) IV Continuous <Continuous>  dextrose 20%. 500 milliLiter(s) (50 mL/Hr) IV Continuous <Continuous>  diazoxide Suspension 300 milliGRAM(s) Oral every 8 hours  fluticasone propionate 50 MICROgram(s)/spray Nasal Spray 1 Spray(s) Both Nostrils two times a day  octreotide  Injectable 300 MICROGram(s) IV Push every 8 hours  polyethylene glycol 3350 17 Gram(s) Oral daily    MEDICATIONS  (PRN):  acetaminophen     Tablet .. 650 milliGRAM(s) Oral every 6 hours PRN Mild Pain (1 - 3)  simethicone 80 milliGRAM(s) Chew four times a day PRN Upset Stomach  sodium chloride 0.65% Nasal 1 Spray(s) Both Nostrils three times a day PRN Nasal Congestion  sodium chloride 0.9% lock flush 10 milliLiter(s) IV Push every 1 hour PRN Pre/post blood products, medications, blood draw, and to maintain line patency      Allergies    No Known Allergies    Intolerances          PHYSICAL EXAM:  VITALS: T(C): 37 (01-10-25 @ 12:00)  T(F): 98.6 (01-10-25 @ 12:00), Max: 98.6 (01-10-25 @ 12:00)  HR: 99 (01-10-25 @ 12:00) (92 - 103)  BP: 125/57 (01-10-25 @ 12:00) (89/55 - 145/63)  RR:  (15 - 21)  SpO2:  (92% - 98%)  Wt(kg): --  GENERAL: NAD, obese  EYES: No proptosis, no lid lag, anicteric  RESPIRATORY: No respiratory distress, normal effort  CARDIOVASCULAR: no edema  GI: non distended  EXT: SAUCEDO  PSYCH: Alert and oriented x 3, reactive affect      A1C with Estimated Average Glucose Result: 4.9 % (01-03-25 @ 06:20)      POCT Blood Glucose.: 139 mg/dL (01-10-25 @ 12:09)  POCT Blood Glucose.: 117 mg/dL (01-10-25 @ 07:02)  POCT Blood Glucose.: 137 mg/dL (01-09-25 @ 17:17)  POCT Blood Glucose.: 118 mg/dL (01-09-25 @ 13:53)  POCT Blood Glucose.: 107 mg/dL (01-09-25 @ 13:48)  POCT Blood Glucose.: 160 mg/dL (01-09-25 @ 13:47)  POCT Blood Glucose.: 106 mg/dL (01-09-25 @ 05:50)  POCT Blood Glucose.: 123 mg/dL (01-08-25 @ 17:28)  POCT Blood Glucose.: 85 mg/dL (01-08-25 @ 12:14)  POCT Blood Glucose.: 91 mg/dL (01-08-25 @ 05:59)  POCT Blood Glucose.: 103 mg/dL (01-07-25 @ 23:15)  POCT Blood Glucose.: 108 mg/dL (01-07-25 @ 17:00)  POCT Blood Glucose.: 105 mg/dL (01-07-25 @ 14:52)      01-10    140  |  104  |  11  ----------------------------<  133[H]  3.5   |  24  |  0.90    eGFR: 109    Ca    8.5      01-10  Mg     1.9     01-10  Phos  3.1     01-10    TPro  6.3  /  Alb  3.3  /  TBili  0.3  /  DBili  x   /  AST  8[L]  /  ALT  10  /  AlkPhos  65  01-10      Thyroid Function Tests:  01-03 @ 04:55 TSH 3.50 FreeT4 1.3 T3 -- Anti TPO -- Anti Thyroglobulin Ab -- TSI --  01-02 @ 15:54 TSH 1.60 FreeT4 -- T3 -- Anti TPO -- Anti Thyroglobulin Ab -- TSI --

## 2025-01-11 NOTE — OCCUPATIONAL THERAPY INITIAL EVALUATION ADULT - PERTINENT HX OF CURRENT PROBLEM, REHAB EVAL
43 yo M with pmhx of HTN comes to the ED after experiencing confusion and weakness. Patient's symptoms started on 12/30 while he was away in [place]. He was experiencing 4 episodes of diarrhea at the time. However, he attributed his symptoms to gastroenteritis as his family members were experiencing similar symptoms. He returned to [place] and was found to be experiencing confusion as he was spraying the ceiling with water. He went to urgent care and was then recommended to come to the hospital. He has never experienced symptoms like this prior. His ROS otherwise is notable for a history of night sweats and he is unsure if he has experienced unintentional weight loss. Today, he is feeling better as his confusion and weakness has improved. His last episode of diarrhea was also the day prior to coming to the hospital.  In the ED, his vitals were wnl and his labs were significant for persistent hypoglycemia and hypoglycemia. A CT A/P was also obtained that showed pancreatic lesions s/p D50 and initiation of D5LR. He is now admitted for further management.    CT Abdomen/Pelvis: Lobulated 4.3 cm enhancing pancreatic tail mass, suspicious for a neuroendocrine tumor given the patient's clinical history. Additional 2.1 cm indeterminate hypoattenuating lesion in the pancreatic body. Consider contrast enhanced MRI of the abdomen/MRCP for further evaluation.
45 yo M with pmhx of HTN comes to the ED after experiencing confusion and weakness. Patient's symptoms started on 12/30 while he was away in [place]. He was experiencing 4 episodes of diarrhea at the time. However, he attributed his symptoms to gastroenteritis as his family members were experiencing similar symptoms. He returned to [place] and was found to be experiencing confusion as he was spraying the ceiling with water. He went to urgent care and was then recommended to come to the hospital. He has never experienced symptoms like this prior. His ROS otherwise is notable for a history of night sweats and he is unsure if he has experienced unintentional weight loss. Today, he is feeling better as his confusion and weakness has improved. His last episode of diarrhea was also the day prior to coming to the hospital.  In the ED, his vitals were wnl and his labs were significant for persistent hypoglycemia and hypoglycemia. A CT A/P was also obtained that showed pancreatic lesions s/p D50 and initiation of D5LR. He is now admitted for further management.   Pt now s/p OR 1/10 for Distal Pancreatectomy with splenectomy with Dr. Woods on 1/10/2025.    XRAY CHEST: Decreased congestion. Small left effusion.1.  No pulmonary metastasis.2.  1.9 cm RIGHT thyroid nodule. If not previously evaluated primary characterization with ultrasound recommended    CT Abdomen/Pelvis: Lobulated 4.3 cm enhancing pancreatic tail mass, suspicious for a neuroendocrine tumor given the patient's clinical history. Additional 2.1 cm indeterminate hypoattenuating lesion in the pancreatic body. Consider contrast enhanced MRI of the abdomen/MRCP for further evaluation.

## 2025-01-11 NOTE — PROGRESS NOTE ADULT - ASSESSMENT
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    Respiratory  - Extubated after surgery  - On 3l NC    Cardiovascular  - No pressor requirements  - map goal >65      GI/Nutrition  -NPO s/p surgery    /Renal  - LR @125  - driscoll in place  - monitor I&Os    Hematology  - vte ppx held    ID  - cefotetin for 24hrs post op.  - trend  fevers, wbc  - patient afebrile, no leukocytosis     Endocrine  - s/p OR 1/10 for resection of Insulinoma   - CT abdomen and pelvis showing lesions in pancreatic tail along with persistent hypoglycemia c/f Insulinoma  - q2 FS checks

## 2025-01-11 NOTE — OCCUPATIONAL THERAPY INITIAL EVALUATION ADULT - GENERAL OBSERVATIONS, REHAB EVAL
Pt received semisupine in bed, +IV, +ICU monitoring
Pt received seated in chair+ [place] +IV +ICU monitoring+ am[place]

## 2025-01-11 NOTE — OCCUPATIONAL THERAPY INITIAL EVALUATION ADULT - LIVES WITH, PROFILE
Pt lives with wife and children in apartment, 5 steps to enter, +flight inside, I in ADLs and ambulation prior to admission
Pt lives with wife and children in apartment, 5 steps to enter, +flight inside, I in ADLs and ambulation prior to admission

## 2025-01-11 NOTE — PHYSICAL THERAPY INITIAL EVALUATION ADULT - GENERAL OBSERVATIONS, REHAB EVAL
Pt a/w generalized weakness and confusion who was admitted to MICU for significant and persistent hypoglycemia c/f insulinoma treated with fluids, octreotide, diazoxide, now s/p robotic distal pancreatectomy and splenectomy 1/10. Cleared by RN Kaya for PT eval. Pt received sitting in ebdside chair, +ICU monitoring, +AARON drain, +ivonne, +driscoll, +IVL. Pt is A&OX4, follows commands, +slight [place], family at bedside.
Patient received semi reclined in bed in MICU, NAD, VSS, +ICU monitors, +PIV infusing, room air

## 2025-01-11 NOTE — PHYSICAL THERAPY INITIAL EVALUATION ADULT - IMPAIRMENTS CONTRIBUTING TO GAIT DEVIATIONS, PT EVAL
decr endurance/impaired balance/pain/impaired postural control/decreased strength Alert and oriented to person, place and time/Patient baseline mental status/Awake/No adverse reaction to first time med in ED

## 2025-01-11 NOTE — CHART NOTE - NSCHARTNOTEFT_GEN_A_CORE
Post Operative Note  Patient: GET JEFFRIES 44y (1980) Male   MRN: [id number]  Location: [place] [address]  Visit: 01-02-25 Inpatient  Date: 01-11-25 @ 01:14    Procedure: S/P Distal Pancreatectomy with splenectomy with Dr. Woods on 1/10/2025.      Subjective: Patient seen and examined post operatively. Reports pain as 3/10. Denies nausea, vomiting, fever, chills, chest pain, SOB, cough.      Objective:  Vitals: T(F): 98.7 (01-10-25 @ 23:00), Max: 98.7 (01-10-25 @ 23:00)  HR: 110 (01-11-25 @ 01:00)  BP: 131/60 (01-10-25 @ 20:00) (89/55 - 145/63)  RR: 15 (01-11-25 @ 01:00)  SpO2: 96% (01-11-25 @ 01:00)  Vent Settings:     In:   01-09-25 @ 07:01  -  01-10-25 @ 07:00  --------------------------------------------------------  IN: 4175 mL    01-10-25 @ 07:01  -  01-11-25 @ 01:14  --------------------------------------------------------  IN: 1150 mL      IV Fluids: lactated ringers. 1000 milliLiter(s) (125 mL/Hr) IV Continuous <Continuous>      Out:   01-09-25 @ 07:01  -  01-10-25 @ 07:00  --------------------------------------------------------  OUT: 1120 mL    01-10-25 @ 07:01  -  01-11-25 @ 01:14  --------------------------------------------------------  OUT: 1725 mL      EBL:     Voided Urine:   01-09-25 @ 07:01  -  01-10-25 @ 07:00  --------------------------------------------------------  OUT: 1120 mL    01-10-25 @ 07:01  -  01-11-25 @ 01:14  --------------------------------------------------------  OUT: 1725 mL      Physical Examination:  General: NAD, resting comfortably in bed  HEENT: Normocephalic atraumatic  Respiratory: Nonlabored respirations, normal CW expansion.  Cardio: S1S2, regular rate and rhythm.  Abdomen: softly distended, appropriately tender, portal and midline surgical incisions are c/d/i. Drain dressing c/di. Ulices Drain with serosang fluid present.   Vascular: extremities are warm and well perfused.     Imaging:  No post-op imaging studies    Assessment:  44yMale patient S/P Distal Pancreatectomy with splenectomy with Dr. Woods on 1/10/2025.  Recovering in SICU and hemodynamically stable.       Plan:  - IV Abx: Cefotetan X1  - Pain control   - HSQ okay for POD1  - Diet: NPO except ice chips  - IVF  - Appreciate care per SICU     Date/Time: 01-10-25 @ 23:14

## 2025-01-11 NOTE — PHYSICAL THERAPY INITIAL EVALUATION ADULT - ADDITIONAL COMMENTS
Patient lives with spouse and children in apartment, 5-6 steps to enter, 1 flight within apartment. Patient independent with ADLs/IADLs, no DME, +Drives
Patient lives with spouse and children in apartment, 5-6 steps to enter, 1 flight within apartment. Patient independent with ADLs/IADLs, no DME, +Drives

## 2025-01-11 NOTE — OCCUPATIONAL THERAPY INITIAL EVALUATION ADULT - PLANNED THERAPY INTERVENTIONS, OT EVAL
Patient is functionally independent, no skilled OT intervention is needed.
ADL retraining/balance training/bed mobility training/transfer training

## 2025-01-11 NOTE — PROGRESS NOTE ADULT - SUBJECTIVE AND OBJECTIVE BOX
24 HOUR EVENTS:  - Pt accepted by SICU  - s/p OR 1/10    NEURO  Exam: axox4, drowsy  Meds: acetaminophen   IVPB .. 1000 milliGRAM(s) IV Intermittent every 8 hours  HYDROmorphone  Injectable 0.5 milliGRAM(s) IV Push every 3 hours PRN breakthrough pain  oxyCODONE    IR 5 milliGRAM(s) Oral every 4 hours PRN Moderate Pain (4 - 6)  oxyCODONE    IR 10 milliGRAM(s) Oral every 4 hours PRN Severe Pain (7 - 10)      RESPIRATORY  RR: 14 (01-11-25 @ 00:00) (14 - 21)  SpO2: 96% (01-11-25 @ 00:00) (92% - 97%)  Wt(kg): --  Exam: Lungs CTA b/l  Mechanical Ventilation: n/a  ABG - ( 10 Issa 2025 20:00 )  pH: 7.33  /  pCO2: 46    /  pO2: 72    / HCO3: 24    / Base Excess: -1.8  /  SaO2: 96.5    Lactate: x                Meds:     CARDIOVASCULAR  HR: 112 (01-11-25 @ 00:00) (92 - 112)  BP: 131/60 (01-10-25 @ 20:00) (89/55 - 145/63)  BP(mean): 86 (01-10-25 @ 20:00) (68 - 90)  ABP: 123/66 (01-11-25 @ 00:00) (123/65 - 143/68)  ABP(mean): 82 (01-11-25 @ 00:00) (82 - 90)  Wt(kg): --  CVP(cm H2O): --  VBG - ( 10 Issa 2025 00:46 )  pH: 7.38  /  pCO2: 50    /  pO2: 56    / HCO3: 30    / Base Excess: 3.7   /  SaO2: 89.8   Lactate: 1.6                Exam: Normal S1/S2  Cardiac Rhythm:   Perfusion     [x ]Adequate   [ ]Inadequate  Mentation   [x ]Normal       [ ]Reduced  Extremities  [x ]Warm         [ ]Cool  Meds:     GI/NUTRITION  Exam: soft  Diet: NPO w/ sips  Last Bowel Movement: 09-Jan-2025 (01-10-25 @ 20:00)  Last Bowel Movement: 09-Jan-2025 (01-09-25 @ 20:00)  Last Bowel Movement: 07-Jan-2025 (01-08-25 @ 08:00)  Last Bowel Movement: 07-Jan-2025 (01-07-25 @ 20:00)  Last Bowel Movement: 06-Jan-2025 (01-07-25 @ 08:00)  Last Bowel Movement: 06-Jan-2025 (01-06-25 @ 20:00)  Last Bowel Movement: 06-Jan-2025 (01-06-25 @ 08:00)  Last Bowel Movement: 05-Jan-2025 (01-05-25 @ 20:00)  Last Bowel Movement: 04-Jan-2025 (01-05-25 @ 08:00)  Last Bowel Movement: 04-Jan-2025 (01-04-25 @ 20:00)  Last Bowel Movement: 02-Jan-2025 (01-03-25 @ 20:00)    Meds:     GENITOURINARY  I&O's Detail    01-09 @ 07:01  -  01-10 @ 07:00  --------------------------------------------------------  IN:    dextrose 10% + sodium chloride 0.9%: 500 mL    dextrose 10% + sodium chloride 0.9%: 1425 mL    dextrose 20%: 1150 mL    IV PiggyBack: 200 mL    Oral Fluid: 900 mL  Total IN: 4175 mL    OUT:    Voided (mL): 1120 mL  Total OUT: 1120 mL    Total NET: 3055 mL      01-10 @ 07:01 - 01-11 @ 00:13  --------------------------------------------------------  IN:    dextrose 10% + sodium chloride 0.9%: 150 mL    dextrose 20%: 100 mL    IV PiggyBack: 150 mL    Lactated Ringers: 625 mL  Total IN: 1025 mL    OUT:    Indwelling Catheter - Urethral (mL): 975 mL    Voided (mL): 600 mL  Total OUT: 1575 mL    Total NET: -550 mL        Weight (kg): 133.8 (01-10 @ 15:10)  01-10    134[L]  |  100  |  11  ----------------------------<  279[H]  4.6   |  20[L]  |  0.90    Ca    8.9      10 Issa 2025 20:12  Phos  4.2     01-10  Mg     2.0     01-10    TPro  6.6  /  Alb  3.3  /  TBili  0.7  /  DBili  x   /  AST  27  /  ALT  29  /  AlkPhos  74  01-10    Meds: lactated ringers. 1000 milliLiter(s) IV Continuous <Continuous>      HEMATOLOGIC  Meds:                         11.2   15.68 )-----------( 322      ( 10 Issa 2025 20:12 )             37.5     PT/INR - ( 10 Issa 2025 20:12 )   PT: 12.9 sec;   INR: 1.13 ratio         PTT - ( 10 Issa 2025 20:12 )  PTT:29.3 sec    INFECTIOUS DISEASES  T(C): 37.1 (01-10-25 @ 23:00), Max: 37.1 (01-10-25 @ 23:00)  Wt(kg): --  WBC Count: 15.68 K/uL (01-10 @ 20:12)  WBC Count: 5.95 K/uL (01-10 @ 00:59)    Recent Cultures:    Meds: cefoTEtan  IVPB 2 Gram(s) IV Intermittent every 12 hours      ENDOCRINE  Capillary Blood Glucose    Meds:     OTHER MEDICATIONS:  chlorhexidine 2% Cloths 1 Application(s) Topical <User Schedule>  chlorhexidine 4% Liquid 1 Application(s) Topical <User Schedule>  lidocaine   4% Patch 1 Patch Transdermal every 24 hours      IMAGING: 24 HOUR EVENTS:  - Pt accepted by SICU  - s/p OR 1/10  - 1 L bolus overnight for increased lactate    NEURO  Exam: axox4, drowsy  Meds: acetaminophen   IVPB .. 1000 milliGRAM(s) IV Intermittent every 8 hours  HYDROmorphone  Injectable 0.5 milliGRAM(s) IV Push every 3 hours PRN breakthrough pain  oxyCODONE    IR 5 milliGRAM(s) Oral every 4 hours PRN Moderate Pain (4 - 6)  oxyCODONE    IR 10 milliGRAM(s) Oral every 4 hours PRN Severe Pain (7 - 10)      RESPIRATORY  RR: 14 (01-11-25 @ 00:00) (14 - 21)  SpO2: 96% (01-11-25 @ 00:00) (92% - 97%)  Wt(kg): --  Exam: Lungs CTA b/l  Mechanical Ventilation: n/a  ABG - ( 10 Issa 2025 20:00 )  pH: 7.33  /  pCO2: 46    /  pO2: 72    / HCO3: 24    / Base Excess: -1.8  /  SaO2: 96.5    Lactate: x                Meds:     CARDIOVASCULAR  HR: 112 (01-11-25 @ 00:00) (92 - 112)  BP: 131/60 (01-10-25 @ 20:00) (89/55 - 145/63)  BP(mean): 86 (01-10-25 @ 20:00) (68 - 90)  ABP: 123/66 (01-11-25 @ 00:00) (123/65 - 143/68)  ABP(mean): 82 (01-11-25 @ 00:00) (82 - 90)  Wt(kg): --  CVP(cm H2O): --  VBG - ( 10 Issa 2025 00:46 )  pH: 7.38  /  pCO2: 50    /  pO2: 56    / HCO3: 30    / Base Excess: 3.7   /  SaO2: 89.8   Lactate: 1.6                Exam: Normal S1/S2  Cardiac Rhythm:   Perfusion     [x ]Adequate   [ ]Inadequate  Mentation   [x ]Normal       [ ]Reduced  Extremities  [x ]Warm         [ ]Cool  Meds:     GI/NUTRITION  Exam: soft  Diet: NPO w/ sips  Last Bowel Movement: 09-Jan-2025 (01-10-25 @ 20:00)  Last Bowel Movement: 09-Jan-2025 (01-09-25 @ 20:00)  Last Bowel Movement: 07-Jan-2025 (01-08-25 @ 08:00)  Last Bowel Movement: 07-Jan-2025 (01-07-25 @ 20:00)  Last Bowel Movement: 06-Jan-2025 (01-07-25 @ 08:00)  Last Bowel Movement: 06-Jan-2025 (01-06-25 @ 20:00)  Last Bowel Movement: 06-Jan-2025 (01-06-25 @ 08:00)  Last Bowel Movement: 05-Jan-2025 (01-05-25 @ 20:00)  Last Bowel Movement: 04-Jan-2025 (01-05-25 @ 08:00)  Last Bowel Movement: 04-Jan-2025 (01-04-25 @ 20:00)  Last Bowel Movement: 02-Jan-2025 (01-03-25 @ 20:00)    Meds:     GENITOURINARY  I&O's Detail    01-09 @ 07:01  -  01-10 @ 07:00  --------------------------------------------------------  IN:    dextrose 10% + sodium chloride 0.9%: 500 mL    dextrose 10% + sodium chloride 0.9%: 1425 mL    dextrose 20%: 1150 mL    IV PiggyBack: 200 mL    Oral Fluid: 900 mL  Total IN: 4175 mL    OUT:    Voided (mL): 1120 mL  Total OUT: 1120 mL    Total NET: 3055 mL      01-10 @ 07:01  -  01-11 @ 00:13  --------------------------------------------------------  IN:    dextrose 10% + sodium chloride 0.9%: 150 mL    dextrose 20%: 100 mL    IV PiggyBack: 150 mL    Lactated Ringers: 625 mL  Total IN: 1025 mL    OUT:    Indwelling Catheter - Urethral (mL): 975 mL    Voided (mL): 600 mL  Total OUT: 1575 mL    Total NET: -550 mL        Weight (kg): 133.8 (01-10 @ 15:10)  01-10    134[L]  |  100  |  11  ----------------------------<  279[H]  4.6   |  20[L]  |  0.90    Ca    8.9      10 Issa 2025 20:12  Phos  4.2     01-10  Mg     2.0     01-10    TPro  6.6  /  Alb  3.3  /  TBili  0.7  /  DBili  x   /  AST  27  /  ALT  29  /  AlkPhos  74  01-10    Meds: lactated ringers. 1000 milliLiter(s) IV Continuous <Continuous>      HEMATOLOGIC  Meds:                         11.2   15.68 )-----------( 322      ( 10 Issa 2025 20:12 )             37.5     PT/INR - ( 10 Issa 2025 20:12 )   PT: 12.9 sec;   INR: 1.13 ratio         PTT - ( 10 Issa 2025 20:12 )  PTT:29.3 sec    INFECTIOUS DISEASES  T(C): 37.1 (01-10-25 @ 23:00), Max: 37.1 (01-10-25 @ 23:00)  Wt(kg): --  WBC Count: 15.68 K/uL (01-10 @ 20:12)  WBC Count: 5.95 K/uL (01-10 @ 00:59)    Recent Cultures:    Meds: cefoTEtan  IVPB 2 Gram(s) IV Intermittent every 12 hours      ENDOCRINE  Capillary Blood Glucose    Meds:     OTHER MEDICATIONS:  chlorhexidine 2% Cloths 1 Application(s) Topical <User Schedule>  chlorhexidine 4% Liquid 1 Application(s) Topical <User Schedule>  lidocaine   4% Patch 1 Patch Transdermal every 24 hours      IMAGING:

## 2025-01-11 NOTE — PROGRESS NOTE ADULT - ASSESSMENT
Patient is a 44 year old male with past medical history of hypertension who presented to the hospital with hypoglycemia. 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    #Hypoglycemia secondary to insulinoma  - CT abd/pel with PANCREAS: Lobulated enhancing mass at the pancreatic tail measuring 4.3 x 3.7 cm, with abutment of the left kidney at the posterior margin. Additional hypoattenuating lesion in the pancreatic body measuring 2.1 x 1.8 cm.  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 There is also a 2.1 x 2.4 cm septated cystic lesion within the pancreatic body without appreciable solid component, which is nonspecific and could represent a mucinous cystadenoma, side branch intraductal papillary mucinous neoplasm (IPMN), or pseudocyst.  - Clinical picture highly suspicious for insulinoma given labs and imaging  - AM cortisol 12.6 1/4/25, rules out adrenal insufficiency  - Sulfonylurea panel negative  - Neuroendocrine tumor markers: Chromogranin 24.1, Gastrin 19 wnl    Recommendations:  - s/p Pancreatectomy, distal, robot-assisted, with splenectomy  - D20, diazoxide, and octreotide stopped after the surgery and BG have trended up.    - he was stared on SS over night and has required 8 units of coverage in less than 24 hours.   - c-peptide pending but can be falsely low if pancreas is stunned post surgery   - PO intake is limited, would recommend Lantus 5 u HS with mod scale ss with meals and low dose at night   - continue to check FSG TID AC and HS  - hypoglycemia protocol  - Outpatient genetic testing     #Elevated PTH  PTHi elevated to 199 with corrected calcium of 8.6, low phos 2.2  Vitamin D 25OH low at 12.7  Likely secondary hyperparathyroidism and less likely primary hyperparathyroid/parathyroid hyperplasia   - Start vitamin D repletion: 50,000 IU weekly x 4 weeks, followed by 2000 IU daily   - Repeat vitamin D 25OH, PTH, CMP in around 6 weeks outpatient    #Thyroid nodule  Incidental finding on CT chest  US performed 1/7/25: Right thyroid nodule: --Nodule 1: - In the medial lower pole/isthmus, measuring 2.0 x 1.0 x 2.1   cm predominantly solid, hypoechoic, TIRADS 4. Moderate vascularity is demonstrated within this nodule with color Doppler.  PLAN:  - FNA indicated, can be done outpatient at office visit or inpatient to expedite care.     #HTN  Management per primary team, currently on amlodipine 7.5 mg qd    *Please note a different provider maybe managing this patient tomorrow/daily*. Please contact our office at [phone] regarding follow-up queries about this patient. For any endocrine emergencies, please state urgency when calling [phone] during business hours and to speak with on-call fellow after 5pm and on weekends.    Terri Mcdaniels MD  Pager: 9AM - 5PM (Mon-Fri): [phone]  After 5PM and on Weekends: [phone]     For nonurgent matters, please email [email or web address] for assistance.

## 2025-01-11 NOTE — PHYSICAL THERAPY INITIAL EVALUATION ADULT - NSPTDISCHREC_GEN_A_CORE
DC pending incr ambulation, pain control and hospital course; prior to OR, pt is functionally independent, SW to be notified.
No skilled PT needs

## 2025-01-11 NOTE — OCCUPATIONAL THERAPY INITIAL EVALUATION ADULT - DIAGNOSIS, OT EVAL
Patient is functionally independent, no skilled OT intervention is needed.
Patient is functionally independent, no skilled OT intervention is needed.

## 2025-01-11 NOTE — PHYSICAL THERAPY INITIAL EVALUATION ADULT - PLANNED THERAPY INTERVENTIONS, PT EVAL
stair neg: GOAL: pt will neg 6 steps 1 HR ind in 4wks./bed mobility training/gait training/strengthening/transfer training

## 2025-01-11 NOTE — PHYSICAL THERAPY INITIAL EVALUATION ADULT - PERTINENT HX OF CURRENT PROBLEM, REHAB EVAL
44 M with pmhx of HTN who initially presented after experiencing generalized weakness and confusion who was admitted to MICU for significant and persistent hypoglycemia. CT showing 4x3cm lesion in tail of pancreas and 2x2cm lesion in body. Patient with persistent hypoglycemia despite D10 administration. Patient also with elevated c-peptide of 5.6. 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  1/2/25: CT head: No acute intracranial hemorrhage, mass effect, or midline shift.  CT abdomen/pelvis: Lobulated 4.3 cm enhancing pancreatic tail mass, suspicious for a neuroendocrine tumor given the patient's clinical history. Additional 2.1 cm indeterminate hypoattenuating lesion in the pancreatic body
44 M with pmhx of HTN who initially presented after experiencing generalized weakness and confusion who was admitted to MICU for significant and persistent hypoglycemia. CT showing 4x3cm lesion in tail of pancreas and 2x2cm lesion in body. Patient with persistent hypoglycemia despite D10 administration. Patient also with elevated c-peptide of 5.6. 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  1/2/25: CT head: No acute intracranial hemorrhage, mass effect, or midline shift.  CT abdomen/pelvis: Lobulated 4.3 cm enhancing pancreatic tail mass, suspicious for a neuroendocrine tumor given the patient's clinical history. Additional 2.1 cm indeterminate hypoattenuating lesion in the pancreatic body. thyroid u/s demonstrating right side 2.1cm hypoechoic solid nodule tirads stage 4, recommendation is for biopsy

## 2025-01-11 NOTE — OCCUPATIONAL THERAPY INITIAL EVALUATION ADULT - BALANCE TRAINING, PT EVAL
GOAL: Pt will demonstrate improved dynamic standing balance by one grade while completing grooming task at sink independently in 3 weeks.

## 2025-01-11 NOTE — PROGRESS NOTE ADULT - SUBJECTIVE AND OBJECTIVE BOX
Surgery Daily Progress Note    24 Hour/ Interval Events:  - No acute overnight events.     Subjective and Interval:  Seen and examined at bedside. Afebrile.   ___________________________________________________  Vital Signs  T(C): 37 (03:00), Max: 37.1 (23:00)  HR: 106 (04:00) (92 - 112)  BP: 131/60 (20:00) (115/57 - 145/63)  ABP: 123/69 (04:00) (113/62 - 143/68)  ABP(mean): 85 (04:00) (77 - 90)  RR: 15 (04:00) (14 - 21)  SpO2: 97% (04:00) (93% - 97%)    Physical Exam  General: NAD, resting comfortably in bed  HEENT: Normocephalic atraumatic  Respiratory: Nonlabored respirations, normal CW expansion.  Cardio: S1S2, regular rate and rhythm.  Abdomen: softly distended, appropriately tender, portal and midline surgical incisions are c/d/i. Drain dressing c/di. Ulices Drain with serosang fluid present.   Vascular: extremities are warm and well perfused.     In&Out    01-09-25 @ 07:01  -  01-10-25 @ 07:00  --------------------------------------------------------  IN: 4175 mL / OUT: 1120 mL / NET: 3055 mL    01-10-25 @ 07:01  -  01-11-25 @ 04:00  --------------------------------------------------------  IN: 1525 mL / OUT: 2100 mL / NET: -575 mL    Labs                          11.2   15.68 )-----------( 322      ( 10 Issa 2025 20:12 )             37.5     01-10    134[L]  |  100  |  11  ----------------------------<  279[H]  4.6   |  20[L]  |  0.90    Ca    8.9      10 Issa 2025 20:12  Phos  4.2     01-10  Mg     2.0     01-10    TPro  6.6  /  Alb  3.3  /  TBili  0.7  /  DBili  x   /  AST  27  /  ALT  29  /  AlkPhos  74  01-10    PT/INR - ( 10 Issa 2025 20:12 )   PT: 12.9 sec;   INR: 1.13 ratio         PTT - ( 10 Issa 2025 20:12 )  PTT:29.3 sec      Medications  acetaminophen   IVPB .. 1000 milliGRAM(s) IV Intermittent every 8 hours  cefoTEtan  IVPB 2 Gram(s) IV Intermittent every 12 hours  chlorhexidine 2% Cloths 1 Application(s) Topical <User Schedule>  chlorhexidine 4% Liquid 1 Application(s) Topical <User Schedule>  HYDROmorphone  Injectable 0.5 milliGRAM(s) IV Push every 3 hours PRN  insulin lispro (ADMELOG) corrective regimen sliding scale   SubCutaneous every 4 hours  lactated ringers. 1000 milliLiter(s) IV Continuous <Continuous>  lidocaine   4% Patch 1 Patch Transdermal every 24 hours  oxyCODONE    IR 5 milliGRAM(s) Oral every 4 hours PRN  oxyCODONE    IR 10 milliGRAM(s) Oral every 4 hours PRN      ___________________________________________________  Assessment & Plan    44 M PMH HTN who initially presented after experiencing generalized weakness and confusion who was admitted to MICU for significant and persistent hypoglycemia. CT showing 4x3cm lesion in tail of pancreas and 2x2cm lesion in body. Patient with persistent hypoglycemia despite D10 administration. Patient also with elevated c-peptide of 5.6. 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 Now S/P Distal Pancreatectomy with splenectomy with Dr. Woods on 1/10/2025.  Recovering in SICU and hemodynamically stable.     Plan:   - IV Abx: Cefotetan X1  - Pain control   - HSQ okay for POD1  - Diet: NPO except ice chips  - IVF  - Appreciate care per SICU     [facility], [id number]        Surgery Daily Progress Note    24 Hour/ Interval Events:  - No acute overnight events.   - Blood glucose elevated overnight (266-303)    Subjective and Interval:  Seen and examined at bedside. Afebrile.   ___________________________________________________  Vital Signs  T(C): 37 (03:00), Max: 37.1 (23:00)  HR: 106 (04:00) (92 - 112)  BP: 131/60 (20:00) (115/57 - 145/63)  ABP: 123/69 (04:00) (113/62 - 143/68)  ABP(mean): 85 (04:00) (77 - 90)  RR: 15 (04:00) (14 - 21)  SpO2: 97% (04:00) (93% - 97%)    Physical Exam  General: NAD, resting comfortably in bed  HEENT: Normocephalic atraumatic  Respiratory: Nonlabored respirations, normal CW expansion.  Cardio: S1S2, regular rate and rhythm.  Abdomen: softly distended, appropriately tender, portal and midline surgical incisions are c/d/i. Drain dressing c/di. Ulices Drain with serosang fluid present.   Vascular: extremities are warm and well perfused.     In&Out    01-09-25 @ 07:01  -  01-10-25 @ 07:00  --------------------------------------------------------  IN: 4175 mL / OUT: 1120 mL / NET: 3055 mL    01-10-25 @ 07:01  -  01-11-25 @ 04:00  --------------------------------------------------------  IN: 1525 mL / OUT: 2100 mL / NET: -575 mL    Labs                          11.2   15.68 )-----------( 322      ( 10 Issa 2025 20:12 )             37.5     01-10    134[L]  |  100  |  11  ----------------------------<  279[H]  4.6   |  20[L]  |  0.90    Ca    8.9      10 Issa 2025 20:12  Phos  4.2     01-10  Mg     2.0     01-10    TPro  6.6  /  Alb  3.3  /  TBili  0.7  /  DBili  x   /  AST  27  /  ALT  29  /  AlkPhos  74  01-10    PT/INR - ( 10 Issa 2025 20:12 )   PT: 12.9 sec;   INR: 1.13 ratio         PTT - ( 10 Issa 2025 20:12 )  PTT:29.3 sec      Medications  acetaminophen   IVPB .. 1000 milliGRAM(s) IV Intermittent every 8 hours  cefoTEtan  IVPB 2 Gram(s) IV Intermittent every 12 hours  chlorhexidine 2% Cloths 1 Application(s) Topical <User Schedule>  chlorhexidine 4% Liquid 1 Application(s) Topical <User Schedule>  HYDROmorphone  Injectable 0.5 milliGRAM(s) IV Push every 3 hours PRN  insulin lispro (ADMELOG) corrective regimen sliding scale   SubCutaneous every 4 hours  lactated ringers. 1000 milliLiter(s) IV Continuous <Continuous>  lidocaine   4% Patch 1 Patch Transdermal every 24 hours  oxyCODONE    IR 5 milliGRAM(s) Oral every 4 hours PRN  oxyCODONE    IR 10 milliGRAM(s) Oral every 4 hours PRN      ___________________________________________________  Assessment & Plan    44 M PMH HTN who initially presented after experiencing generalized weakness and confusion who was admitted to MICU for significant and persistent hypoglycemia. CT showing 4x3cm lesion in tail of pancreas and 2x2cm lesion in body. Patient with persistent hypoglycemia despite D10 administration. Patient also with elevated c-peptide of 5.6. 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 Now S/P Distal Pancreatectomy with splenectomy with Dr. Woods on 1/10/2025.  Recovering in SICU and hemodynamically stable.     Plan:   - IV Abx: Cefotetan X1  - Pain control   - HSQ okay for POD1  - Diet: NPO except ice chips  - IVF  - Appreciate care per SICU     [facility], [id number]        Surgery Daily Progress Note    24 Hour/ Interval Events:  - No acute overnight events.   - Blood glucose elevated overnight (266-303)    Subjective and Interval:  Seen and examined at bedside. Afebrile.   ___________________________________________________  Vital Signs  T(C): 37 (03:00), Max: 37.1 (23:00)  HR: 106 (04:00) (92 - 112)  BP: 131/60 (20:00) (115/57 - 145/63)  ABP: 123/69 (04:00) (113/62 - 143/68)  ABP(mean): 85 (04:00) (77 - 90)  RR: 15 (04:00) (14 - 21)  SpO2: 97% (04:00) (93% - 97%)    Physical Exam  General: NAD, resting comfortably in bed  HEENT: Normocephalic atraumatic  Respiratory: Nonlabored respirations, normal CW expansion.  Cardio: S1S2, regular rate and rhythm.  Abdomen: softly distended, appropriately tender, portal and midline surgical incisions are c/d/i. Drain dressing c/di. Ulices Drain with serosang fluid present.   Vascular: extremities are warm and well perfused.     In&Out    01-09-25 @ 07:01  -  01-10-25 @ 07:00  --------------------------------------------------------  IN: 4175 mL / OUT: 1120 mL / NET: 3055 mL    01-10-25 @ 07:01  -  01-11-25 @ 04:00  --------------------------------------------------------  IN: 1525 mL / OUT: 2100 mL / NET: -575 mL    Labs                          11.2   15.68 )-----------( 322      ( 10 Issa 2025 20:12 )             37.5     01-10    134[L]  |  100  |  11  ----------------------------<  279[H]  4.6   |  20[L]  |  0.90    Ca    8.9      10 Issa 2025 20:12  Phos  4.2     01-10  Mg     2.0     01-10    TPro  6.6  /  Alb  3.3  /  TBili  0.7  /  DBili  x   /  AST  27  /  ALT  29  /  AlkPhos  74  01-10    PT/INR - ( 10 Issa 2025 20:12 )   PT: 12.9 sec;   INR: 1.13 ratio         PTT - ( 10 Issa 2025 20:12 )  PTT:29.3 sec      Medications  acetaminophen   IVPB .. 1000 milliGRAM(s) IV Intermittent every 8 hours  cefoTEtan  IVPB 2 Gram(s) IV Intermittent every 12 hours  chlorhexidine 2% Cloths 1 Application(s) Topical <User Schedule>  chlorhexidine 4% Liquid 1 Application(s) Topical <User Schedule>  HYDROmorphone  Injectable 0.5 milliGRAM(s) IV Push every 3 hours PRN  insulin lispro (ADMELOG) corrective regimen sliding scale   SubCutaneous every 4 hours  lactated ringers. 1000 milliLiter(s) IV Continuous <Continuous>  lidocaine   4% Patch 1 Patch Transdermal every 24 hours  oxyCODONE    IR 5 milliGRAM(s) Oral every 4 hours PRN  oxyCODONE    IR 10 milliGRAM(s) Oral every 4 hours PRN      ___________________________________________________  Assessment & Plan    44 M PMH HTN who initially presented after experiencing generalized weakness and confusion who was admitted to MICU for significant and persistent hypoglycemia. CT showing 4x3cm lesion in tail of pancreas and 2x2cm lesion in body. Patient with persistent hypoglycemia despite D10 administration. Patient also with elevated c-peptide of 5.6. 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 Now S/P Distal Pancreatectomy with splenectomy with Dr. Woods on 1/10/2025.  Recovering in SICU and hemodynamically stable.     Plan:   - IV Abx: Cefotetan X1  - Pain control   - HSQ okay for POD1  - CLD  - f/u TOV   - f/u c-peptide   - Appreciate care per SICU     [facility], [id number]

## 2025-01-11 NOTE — OCCUPATIONAL THERAPY INITIAL EVALUATION ADULT - ASR WT BEARING STATUS EVAL
Fernando Olivarez from [facility] called stating that they need orders on this patient for a left breast diagnostic mammogram faxed to them Monday before noon when she has her appt. sandy.  Fax# [phone]
no weight-bearing restrictions
no weight-bearing restrictions

## 2025-01-12 LAB
5OH-INDOLEACETATE UR-MCNC: 3.01 G/24 H — HIGH (ref 0.5–2.15)
5OH-INDOLEACETATE UR-MCNC: 3350 ML — SIGNIFICANT CHANGE UP
5OH-INDOLEACETATE UR-MCNC: 4.7 MG/24 H — SIGNIFICANT CHANGE UP
ALBUMIN SERPL ELPH-MCNC: 2.9 G/DL — LOW (ref 3.3–5)
ALP SERPL-CCNC: 68 U/L — SIGNIFICANT CHANGE UP (ref 40–120)
ALT FLD-CCNC: 25 U/L — SIGNIFICANT CHANGE UP (ref 10–45)
AMYLASE FLD-CCNC: 558 U/L — SIGNIFICANT CHANGE UP
ANION GAP SERPL CALC-SCNC: 11 MMOL/L — SIGNIFICANT CHANGE UP (ref 5–17)
APTT BLD: 28.1 SEC — SIGNIFICANT CHANGE UP (ref 24.5–35.6)
AST SERPL-CCNC: 23 U/L — SIGNIFICANT CHANGE UP (ref 10–40)
BASOPHILS # BLD AUTO: 0.05 K/UL — SIGNIFICANT CHANGE UP (ref 0–0.2)
BASOPHILS NFR BLD AUTO: 0.5 % — SIGNIFICANT CHANGE UP (ref 0–2)
BILIRUB SERPL-MCNC: 0.3 MG/DL — SIGNIFICANT CHANGE UP (ref 0.2–1.2)
BUN SERPL-MCNC: 15 MG/DL — SIGNIFICANT CHANGE UP (ref 7–23)
CALCIUM SERPL-MCNC: 8.6 MG/DL — SIGNIFICANT CHANGE UP (ref 8.4–10.5)
CHLORIDE SERPL-SCNC: 101 MMOL/L — SIGNIFICANT CHANGE UP (ref 96–108)
CO2 SERPL-SCNC: 26 MMOL/L — SIGNIFICANT CHANGE UP (ref 22–31)
CREAT SERPL-MCNC: 0.79 MG/DL — SIGNIFICANT CHANGE UP (ref 0.5–1.3)
EGFR: 113 ML/MIN/1.73M2 — SIGNIFICANT CHANGE UP
EOSINOPHIL # BLD AUTO: 0.28 K/UL — SIGNIFICANT CHANGE UP (ref 0–0.5)
EOSINOPHIL NFR BLD AUTO: 2.9 % — SIGNIFICANT CHANGE UP (ref 0–6)
GAS PNL BLDA: SIGNIFICANT CHANGE UP
GLUCOSE BLDC GLUCOMTR-MCNC: 172 MG/DL — HIGH (ref 70–99)
GLUCOSE BLDC GLUCOMTR-MCNC: 172 MG/DL — HIGH (ref 70–99)
GLUCOSE BLDC GLUCOMTR-MCNC: 177 MG/DL — HIGH (ref 70–99)
GLUCOSE BLDC GLUCOMTR-MCNC: 190 MG/DL — HIGH (ref 70–99)
GLUCOSE SERPL-MCNC: 189 MG/DL — HIGH (ref 70–99)
HCT VFR BLD CALC: 33.9 % — LOW (ref 39–50)
HGB BLD-MCNC: 10.1 G/DL — LOW (ref 13–17)
IMM GRANULOCYTES NFR BLD AUTO: 0.3 % — SIGNIFICANT CHANGE UP (ref 0–0.9)
INR BLD: 1.17 RATIO — HIGH (ref 0.85–1.16)
LYMPHOCYTES # BLD AUTO: 1.69 K/UL — SIGNIFICANT CHANGE UP (ref 1–3.3)
LYMPHOCYTES # BLD AUTO: 17.7 % — SIGNIFICANT CHANGE UP (ref 13–44)
MAGNESIUM SERPL-MCNC: 2 MG/DL — SIGNIFICANT CHANGE UP (ref 1.6–2.6)
MCHC RBC-ENTMCNC: 20.3 PG — LOW (ref 27–34)
MCHC RBC-ENTMCNC: 29.8 G/DL — LOW (ref 32–36)
MCV RBC AUTO: 68.2 FL — LOW (ref 80–100)
MONOCYTES # BLD AUTO: 1.03 K/UL — HIGH (ref 0–0.9)
MONOCYTES NFR BLD AUTO: 10.8 % — SIGNIFICANT CHANGE UP (ref 2–14)
NEUTROPHILS # BLD AUTO: 6.46 K/UL — SIGNIFICANT CHANGE UP (ref 1.8–7.4)
NEUTROPHILS NFR BLD AUTO: 67.8 % — SIGNIFICANT CHANGE UP (ref 43–77)
NRBC # BLD: 0 /100 WBCS — SIGNIFICANT CHANGE UP (ref 0–0)
PHOSPHATE SERPL-MCNC: 2.6 MG/DL — SIGNIFICANT CHANGE UP (ref 2.5–4.5)
PLATELET # BLD AUTO: 351 K/UL — SIGNIFICANT CHANGE UP (ref 150–400)
POTASSIUM SERPL-MCNC: 3.8 MMOL/L — SIGNIFICANT CHANGE UP (ref 3.5–5.3)
POTASSIUM SERPL-SCNC: 3.8 MMOL/L — SIGNIFICANT CHANGE UP (ref 3.5–5.3)
PROT SERPL-MCNC: 6.2 G/DL — SIGNIFICANT CHANGE UP (ref 6–8.3)
PROTHROM AB SERPL-ACNC: 13.4 SEC — SIGNIFICANT CHANGE UP (ref 9.9–13.4)
RBC # BLD: 4.97 M/UL — SIGNIFICANT CHANGE UP (ref 4.2–5.8)
RBC # FLD: 16.1 % — HIGH (ref 10.3–14.5)
SODIUM SERPL-SCNC: 138 MMOL/L — SIGNIFICANT CHANGE UP (ref 135–145)
WBC # BLD: 9.54 K/UL — SIGNIFICANT CHANGE UP (ref 3.8–10.5)
WBC # FLD AUTO: 9.54 K/UL — SIGNIFICANT CHANGE UP (ref 3.8–10.5)

## 2025-01-12 PROCEDURE — 71045 X-RAY EXAM CHEST 1 VIEW: CPT | Mod: 26

## 2025-01-12 RX ORDER — POTASSIUM PHOSPHATE, MONOBASIC AND POTASSIUM PHOSPHATE, DIBASIC 224; 236 MG/ML; MG/ML
30 INJECTION, SOLUTION INTRAVENOUS ONCE
Refills: 0 | Status: COMPLETED | OUTPATIENT
Start: 2025-01-12 | End: 2025-01-12

## 2025-01-12 RX ORDER — FUROSEMIDE 20 MG
20 TABLET ORAL ONCE
Refills: 0 | Status: COMPLETED | OUTPATIENT
Start: 2025-01-12 | End: 2025-01-12

## 2025-01-12 RX ORDER — ACETAMINOPHEN 80 MG/.8ML
975 SOLUTION/ DROPS ORAL EVERY 6 HOURS
Refills: 0 | Status: DISCONTINUED | OUTPATIENT
Start: 2025-01-12 | End: 2025-01-14

## 2025-01-12 RX ADMIN — Medication 5 MILLIGRAM(S): at 13:34

## 2025-01-12 RX ADMIN — Medication 2: at 08:05

## 2025-01-12 RX ADMIN — Medication 20 MILLIGRAM(S): at 10:30

## 2025-01-12 RX ADMIN — Medication 2: at 17:30

## 2025-01-12 RX ADMIN — INSULIN GLARGINE-YFGN 10 UNIT(S): 100 INJECTION, SOLUTION SUBCUTANEOUS at 21:19

## 2025-01-12 RX ADMIN — ACETAMINOPHEN 975 MILLIGRAM(S): 80 SOLUTION/ DROPS ORAL at 23:51

## 2025-01-12 RX ADMIN — ACETAMINOPHEN 975 MILLIGRAM(S): 80 SOLUTION/ DROPS ORAL at 12:00

## 2025-01-12 RX ADMIN — Medication 10 MILLIGRAM(S): at 16:54

## 2025-01-12 RX ADMIN — Medication 1: at 21:19

## 2025-01-12 RX ADMIN — ACETAMINOPHEN 975 MILLIGRAM(S): 80 SOLUTION/ DROPS ORAL at 18:00

## 2025-01-12 RX ADMIN — ACETAMINOPHEN 975 MILLIGRAM(S): 80 SOLUTION/ DROPS ORAL at 17:31

## 2025-01-12 RX ADMIN — CHLORHEXIDINE GLUCONATE 1 APPLICATION(S): 1.2 RINSE ORAL at 05:03

## 2025-01-12 RX ADMIN — Medication 2: at 12:46

## 2025-01-12 RX ADMIN — ENOXAPARIN SODIUM 40 MILLIGRAM(S): 60 INJECTION INTRAVENOUS; SUBCUTANEOUS at 11:00

## 2025-01-12 RX ADMIN — Medication 5 MILLIGRAM(S): at 13:04

## 2025-01-12 RX ADMIN — Medication 10 MILLIGRAM(S): at 17:24

## 2025-01-12 RX ADMIN — ACETAMINOPHEN 975 MILLIGRAM(S): 80 SOLUTION/ DROPS ORAL at 11:00

## 2025-01-12 RX ADMIN — POTASSIUM PHOSPHATE, MONOBASIC AND POTASSIUM PHOSPHATE, DIBASIC 83.33 MILLIMOLE(S): 224; 236 INJECTION, SOLUTION INTRAVENOUS at 01:27

## 2025-01-12 RX ADMIN — ACETAMINOPHEN 975 MILLIGRAM(S): 80 SOLUTION/ DROPS ORAL at 06:07

## 2025-01-12 NOTE — CHART NOTE - NSCHARTNOTESELECT_GEN_ALL_CORE
Postoperative Check/Event Note
Transfer/Event Note
Downgrade Canceled/Event Note
Endocrinology/Event Note
MICU Accept Note
MICU downgrade/Event Note
Nutrition Services
Transfer Note

## 2025-01-12 NOTE — PROGRESS NOTE ADULT - ASSESSMENT
44 M PMH HTN who initially presented after experiencing generalized weakness and confusion who was admitted to MICU for significant and persistent hypoglycemia. CT showing 4x3cm lesion in tail of pancreas and 2x2cm lesion in body. Patient with persistent hypoglycemia despite D10 administration. Patient also with elevated c-peptide of 5.6. 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 Now S/P Distal Pancreatectomy with splenectomy with Dr. Woods on 1/10/2025.  Recovering in SICU and hemodynamically stable.     Plan:   - IV Abx: Cefotetan X1  - Pain control   - HSQ okay for POD1  - CLD  - f/u TOV   - f/u c-peptide   - Appreciate care per SICU     [facility], [id number]  44 M PMH HTN who initially presented after experiencing generalized weakness and confusion who was admitted to MICU for significant and persistent hypoglycemia. CT showing 4x3cm lesion in tail of pancreas and 2x2cm lesion in body. Patient with persistent hypoglycemia despite D10 administration. Patient also with elevated c-peptide of 5.6. 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 Now S/P Distal Pancreatectomy with splenectomy with Dr. Woods on 1/10/2025.  Recovering in SICU and hemodynamically stable.     Plan:   - Pain control   - CLD  - OOB   - f/u endocrine recs: currently on 10 lantus @ bedtime   - lovenox restarted   - IVL   - f/u drain amylase [facility], [id number]

## 2025-01-12 NOTE — CHART NOTE - NSCHARTNOTEFT_GEN_A_CORE
SICU Transfer Note    Transfer from: SICU  Transfer to: 2 [facility]   Accepting marilynnan: Chuck    SICU COURSE: Pt is POD#2 from distal pancreatectomy w splenectomy for presumed insulinoma. Was in MICU before surgery for severe hypoglycemia. After surgery, extubated, admitted to SICU for hemodynamic and glucose monitoring. No pressor requirements.       ASSESSMENT & PLAN:           For Follow-Up:      Vital Signs Last 24 Hrs  T(C): 36.8 (12 Jan 2025 06:04), Max: 37.1 (11 Jan 2025 15:00)  T(F): 98.2 (12 Jan 2025 06:04), Max: 98.8 (11 Jan 2025 15:00)  HR: 100 (12 Jan 2025 06:04) (95 - 109)  BP: 120/68 (12 Jan 2025 06:04) (113/54 - 120/68)  BP(mean): 82 (12 Jan 2025 05:00) (78 - 82)  RR: 19 (12 Jan 2025 06:04) (14 - 21)  SpO2: 91% (12 Jan 2025 06:04) (90% - 97%)    Parameters below as of 12 Jan 2025 06:04  Patient On (Oxygen Delivery Method): room air      I&O's Summary    11 Jan 2025 07:01  -  12 Jan 2025 07:00  --------------------------------------------------------  IN: 1719.2 mL / OUT: 1370 mL / NET: 349.2 mL          MEDICATIONS  (STANDING):  acetaminophen     Tablet .. 975 milliGRAM(s) Oral every 6 hours  chlorhexidine 2% Cloths 1 Application(s) Topical <User Schedule>  enoxaparin Injectable 40 milliGRAM(s) SubCutaneous every 24 hours  insulin glargine Injectable (LANTUS) 10 Unit(s) SubCutaneous at bedtime  insulin lispro (ADMELOG) corrective regimen sliding scale   SubCutaneous at bedtime  insulin lispro (ADMELOG) corrective regimen sliding scale   SubCutaneous three times a day before meals  lidocaine   4% Patch 1 Patch Transdermal every 24 hours    MEDICATIONS  (PRN):  HYDROmorphone  Injectable 0.5 milliGRAM(s) IV Push every 3 hours PRN breakthrough pain  oxyCODONE    IR 5 milliGRAM(s) Oral every 4 hours PRN Moderate Pain (4 - 6)  oxyCODONE    IR 10 milliGRAM(s) Oral every 4 hours PRN Severe Pain (7 - 10)        LABS                                            10.1                  Neurophils% (auto):   67.8   (01-12 @ 00:21):    9.54 )-----------(351          Lymphocytes% (auto):  17.7                                          33.9                   Eosinphils% (auto):   2.9      Manual%: Neutrophils x    ; Lymphocytes x    ; Eosinophils x    ; Bands%: x    ; Blasts x                                    138    |  101    |  15                  Calcium: 8.6   / iCa: x      (01-12 @ 00:21)    ----------------------------<  189       Magnesium: 2.0                              3.8     |  26     |  0.79             Phosphorous: 2.6      TPro  6.2    /  Alb  2.9    /  TBili  0.3    /  DBili  x      /  AST  23     /  ALT  25     /  AlkPhos  68     12 Jan 2025 00:21    ( 01-12 @ 00:21 )   PT: 13.4 sec;   INR: 1.17 ratio  aPTT: 28.1 sec SICU Transfer Note    Transfer from: SICU  Transfer to: 2 [facility]   Accepting physican: Chuck    SICU COURSE: Pt is POD#2 from distal pancreatectomy w splenectomy for presumed insulinoma. Was in MICU before surgery for severe hypoglycemia. After surgery, extubated, admitted to SICU for hemodynamic and glucose monitoring. No pressor requirements. Sugars high post op to low 300s, now in high 100s on lantus 10 u at bedtime. Pain is controlled, tolerating CLD, no gas yet. [place] d/c'ed, passed TOV. Lovenox restarted for DVT ppx. IVL. Downgraded on post-op day 2. Stable.       ASSESSMENT & PLAN:   POD#2 from distal pancreatectomy w splenectomy for insulinoma.     - c/w CLD   - monitor bowel fxn   - pain control   - f/u endo recs         For Follow-Up:      Vital Signs Last 24 Hrs  T(C): 36.8 (12 Jan 2025 06:04), Max: 37.1 (11 Jan 2025 15:00)  T(F): 98.2 (12 Jan 2025 06:04), Max: 98.8 (11 Jan 2025 15:00)  HR: 100 (12 Jan 2025 06:04) (95 - 109)  BP: 120/68 (12 Jan 2025 06:04) (113/54 - 120/68)  BP(mean): 82 (12 Jan 2025 05:00) (78 - 82)  RR: 19 (12 Jan 2025 06:04) (14 - 21)  SpO2: 91% (12 Jan 2025 06:04) (90% - 97%)    Parameters below as of 12 Jan 2025 06:04  Patient On (Oxygen Delivery Method): room air      I&O's Summary    11 Jan 2025 07:01  -  12 Jan 2025 07:00  --------------------------------------------------------  IN: 1719.2 mL / OUT: 1370 mL / NET: 349.2 mL          MEDICATIONS  (STANDING):  acetaminophen     Tablet .. 975 milliGRAM(s) Oral every 6 hours  chlorhexidine 2% Cloths 1 Application(s) Topical <User Schedule>  enoxaparin Injectable 40 milliGRAM(s) SubCutaneous every 24 hours  insulin glargine Injectable (LANTUS) 10 Unit(s) SubCutaneous at bedtime  insulin lispro (ADMELOG) corrective regimen sliding scale   SubCutaneous at bedtime  insulin lispro (ADMELOG) corrective regimen sliding scale   SubCutaneous three times a day before meals  lidocaine   4% Patch 1 Patch Transdermal every 24 hours    MEDICATIONS  (PRN):  HYDROmorphone  Injectable 0.5 milliGRAM(s) IV Push every 3 hours PRN breakthrough pain  oxyCODONE    IR 5 milliGRAM(s) Oral every 4 hours PRN Moderate Pain (4 - 6)  oxyCODONE    IR 10 milliGRAM(s) Oral every 4 hours PRN Severe Pain (7 - 10)        LABS                                            10.1                  Neurophils% (auto):   67.8   (01-12 @ 00:21):    9.54 )-----------(351          Lymphocytes% (auto):  17.7                                          33.9                   Eosinphils% (auto):   2.9      Manual%: Neutrophils x    ; Lymphocytes x    ; Eosinophils x    ; Bands%: x    ; Blasts x                                    138    |  101    |  15                  Calcium: 8.6   / iCa: x      (01-12 @ 00:21)    ----------------------------<  189       Magnesium: 2.0                              3.8     |  26     |  0.79             Phosphorous: 2.6      TPro  6.2    /  Alb  2.9    /  TBili  0.3    /  DBili  x      /  AST  23     /  ALT  25     /  AlkPhos  68     12 Jan 2025 00:21    ( 01-12 @ 00:21 )   PT: 13.4 sec;   INR: 1.17 ratio  aPTT: 28.1 sec

## 2025-01-12 NOTE — PROGRESS NOTE ADULT - SUBJECTIVE AND OBJECTIVE BOX
---___---___---___---___---___---___ ---___---___---___---___---___---___---___---___---                  S U R G I C A L   I C U   P R O G R E S S   N O T E  ---___---___---___---___---___---___ ---___---___---___---___---___---___---___---___---    INTERVAL EVENTS:  - adv to CLD  - d/c driscoll> passed TOV  - start Lovenox for DVT ppx  - IVL  - started lantus 10U at bedtime    [ ] Due to altered mental status/intubation, subjective information were not able to be obtained from the patient. History was obtained, to the extent possible, from review of the chart and collateral sources of information.    OBJECTIVE:    VITALS:  Vital Signs Last 24 Hrs  T(C): 36.9 (11 Jan 2025 23:00), Max: 37.1 (11 Jan 2025 15:00)  T(F): 98.5 (11 Jan 2025 23:00), Max: 98.8 (11 Jan 2025 15:00)  HR: 101 (12 Jan 2025 00:00) (95 - 110)  BP: 113/54 (11 Jan 2025 19:00) (113/54 - 113/54)  BP(mean): 78 (11 Jan 2025 19:00) (78 - 78)  RR: 16 (12 Jan 2025 00:00) (14 - 21)  SpO2: 90% (12 Jan 2025 00:00) (90% - 97%)    Parameters below as of 11 Jan 2025 19:00  Patient On (Oxygen Delivery Method): room air        I&O's Summary    10 Issa 2025 07:01  -  11 Jan 2025 07:00  --------------------------------------------------------  IN: 3000 mL / OUT: 2350 mL / NET: 650 mL    11 Jan 2025 07:01  -  12 Jan 2025 00:31  --------------------------------------------------------  IN: 1386 mL / OUT: 780 mL / NET: 606 mL        PHYSICAL EXAM:    NEURO  Exam:  A&Ox4    RESPIRATORY  Exam:  nonlabored breathing on room air  Mechanical Ventilation:     CARDIOVASCULAR  Exam:  RRR, warm and well perfused, edematous  Cardiac Rhythm:      GI/NUTRITION  Exam:  soft, appropriately tender at incision, drain c/d/i, drain w s/s output  Diet: CLD    GENITOURINARY  Exam: voiding    ACCESS DEVICES:  [x ] Peripheral IV  [ ] Central Venous Line	[ ] R	[ ] L	[ ] IJ	[ ] Fem	[ ] SC	Placed:   [ x] Arterial Line		[ ] R	[ ] L	[ ] Fem	[x ] Rad	[ ] Ax	Placed:   [ ] PICC:					[ ] Mediport  [ ] Urinary Catheter, Date Placed:   [x] Necessity of urinary, arterial, and venous catheters discussed      LABS:                        10.4   10.14 )-----------( 338      ( 11 Jan 2025 12:21 )             34.9   01-11    140  |  103  |  12  ----------------------------<  186[H]  4.3   |  25  |  0.77    Ca    8.4      11 Jan 2025 12:21  Phos  3.2     01-11  Mg     2.0     01-11    TPro  6.4  /  Alb  3.0[L]  /  TBili  0.4  /  DBili  x   /  AST  23  /  ALT  27  /  AlkPhos  67  01-11    CAPILLARY BLOOD GLUCOSE      POCT Blood Glucose.: 224 mg/dL (11 Jan 2025 21:58)  POCT Blood Glucose.: 217 mg/dL (11 Jan 2025 16:27)  POCT Blood Glucose.: 189 mg/dL (11 Jan 2025 09:50)  POCT Blood Glucose.: 226 mg/dL (11 Jan 2025 05:45)  POCT Blood Glucose.: 294 mg/dL (11 Jan 2025 02:34)      MEDICATIONS:   MEDICATIONS  (STANDING):  chlorhexidine 2% Cloths 1 Application(s) Topical <User Schedule>  enoxaparin Injectable 40 milliGRAM(s) SubCutaneous every 24 hours  insulin glargine Injectable (LANTUS) 10 Unit(s) SubCutaneous at bedtime  insulin lispro (ADMELOG) corrective regimen sliding scale   SubCutaneous at bedtime  insulin lispro (ADMELOG) corrective regimen sliding scale   SubCutaneous three times a day before meals  lidocaine   4% Patch 1 Patch Transdermal every 24 hours    MEDICATIONS  (PRN):  HYDROmorphone  Injectable 0.5 milliGRAM(s) IV Push every 3 hours PRN breakthrough pain  oxyCODONE    IR 5 milliGRAM(s) Oral every 4 hours PRN Moderate Pain (4 - 6)  oxyCODONE    IR 10 milliGRAM(s) Oral every 4 hours PRN Severe Pain (7 - 10)

## 2025-01-12 NOTE — PROGRESS NOTE ADULT - ASSESSMENT
44 M with pmhx of HTN who initially presented after experiencing generalized weakness and confusion who was admitted to MICU for significant and persistent hypoglycemia c/f insulinoma treated with fluids, octreotide, diazoxide, now s/p robotic distal pancreatectomy and splenectomy 1/10.    PLAN  Neuro:  - AxOx 4  - pain control with prn tylenol and dilaudid   - CTH negative when eval for AMS    Respiratory  - Extubated after surgery  - On NC, titrate down as tolerated    Cardiovascular  - No pressor requirements  - map goal >65  - holding home amlodipine    GI/Nutrition  - CLD  - keep midline dressing s48 hours per primary team  - send drain amlyase in AM    /Renal  - IVL  - voiding  - monitor I&Os    Hematology  - Lovenox  - SCDs  - trend H/H    ID  - cefotetan x1 dose post-op  - trend fevers, wbc    Endocrine  - s/p OR 1/10 for distal pancreatectomy iso insulinoma  - ISS  - Lantus 10U at bedtime  - appreciate endo recs    LINES:   - L radial a-line  - driscoll

## 2025-01-12 NOTE — PROGRESS NOTE ADULT - SUBJECTIVE AND OBJECTIVE BOX
[facility] Team Surgery Daily Progress Note      Interval Events per SICU note:  - adv to CLD  - d/c driscoll> passed TOV  - start Lovenox for DVT ppx  - IVL  - started lantus 10U at bedtime    Subjective:   Patient seen at bedside this AM.     Objective:  Vital Signs  T(C): 36.9 (01-11 @ 23:00), Max: 37.1 (01-11 @ 15:00)  HR: 101 (01-12 @ 01:00) (95 - 109)  BP: 113/54 (01-11 @ 19:00) (113/54 - 113/54)  RR: 17 (01-12 @ 01:00) (14 - 21)  SpO2: 91% (01-12 @ 01:00) (90% - 97%)  01-10-25 @ 07:01  -  01-11-25 @ 07:00  --------------------------------------------------------  IN:  Total IN: 0 mL    OUT:    Drain (mL): 50 mL    Indwelling Catheter - Urethral (mL): 1700 mL    Voided (mL): 600 mL  Total OUT: 2350 mL    Total NET: -2350 mL      01-11-25 @ 07:01  -  01-12-25 @ 01:24  --------------------------------------------------------  IN:  Total IN: 0 mL    OUT:    Indwelling Catheter - Urethral (mL): 560 mL    Voided (mL): 220 mL  Total OUT: 780 mL    Total NET: -780 mL      Physical Exam  General: NAD, resting comfortably in bed  HEENT: Normocephalic atraumatic  Respiratory: Nonlabored respirations, normal CW expansion.  Cardio: S1S2, regular rate and rhythm.  Abdomen: softly distended, appropriately tender, portal and midline surgical incisions are c/d/i. Drain dressing c/di. Ulices Drain with serosang fluid present.   Vascular: extremities are warm and well perfused.       Labs:                        10.1   9.54  )-----------( 351      ( 12 Jan 2025 00:21 )             33.9   01-12    138  |  101  |  15  ----------------------------<  189[H]  3.8   |  26  |  0.79    Ca    8.6      12 Jan 2025 00:21  Phos  2.6     01-12  Mg     2.0     01-12    TPro  6.2  /  Alb  2.9[L]  /  TBili  0.3  /  DBili  x   /  AST  23  /  ALT  25  /  AlkPhos  68  01-12    CAPILLARY BLOOD GLUCOSE      POCT Blood Glucose.: 224 mg/dL (11 Jan 2025 21:58)  POCT Blood Glucose.: 217 mg/dL (11 Jan 2025 16:27)  POCT Blood Glucose.: 189 mg/dL (11 Jan 2025 09:50)  POCT Blood Glucose.: 226 mg/dL (11 Jan 2025 05:45)  POCT Blood Glucose.: 294 mg/dL (11 Jan 2025 02:34)      Medications:   MEDICATIONS  (STANDING):  chlorhexidine 2% Cloths 1 Application(s) Topical <User Schedule>  enoxaparin Injectable 40 milliGRAM(s) SubCutaneous every 24 hours  insulin glargine Injectable (LANTUS) 10 Unit(s) SubCutaneous at bedtime  insulin lispro (ADMELOG) corrective regimen sliding scale   SubCutaneous at bedtime  insulin lispro (ADMELOG) corrective regimen sliding scale   SubCutaneous three times a day before meals  lidocaine   4% Patch 1 Patch Transdermal every 24 hours  potassium phosphate IVPB 30 milliMole(s) IV Intermittent once    MEDICATIONS  (PRN):  HYDROmorphone  Injectable 0.5 milliGRAM(s) IV Push every 3 hours PRN breakthrough pain  oxyCODONE    IR 5 milliGRAM(s) Oral every 4 hours PRN Moderate Pain (4 - 6)  oxyCODONE    IR 10 milliGRAM(s) Oral every 4 hours PRN Severe Pain (7 - 10)      Imaging:       [facility] Team Surgery Daily Progress Note      Interval Events per SICU note:  - adv to CLD  - d/c driscoll> passed TOV  - start Lovenox for DVT ppx  - IVL  - started lantus 10U at bedtime    Subjective:   Patient seen at bedside this AM. Downgraded from SICU. Walked today. Had some abdominal pain with walking. Tolerating diet.     Objective:  Vital Signs  T(C): 36.9 (01-11 @ 23:00), Max: 37.1 (01-11 @ 15:00)  HR: 101 (01-12 @ 01:00) (95 - 109)  BP: 113/54 (01-11 @ 19:00) (113/54 - 113/54)  RR: 17 (01-12 @ 01:00) (14 - 21)  SpO2: 91% (01-12 @ 01:00) (90% - 97%)  01-10-25 @ 07:01  -  01-11-25 @ 07:00  --------------------------------------------------------  IN:  Total IN: 0 mL    OUT:    Drain (mL): 50 mL    Indwelling Catheter - Urethral (mL): 1700 mL    Voided (mL): 600 mL  Total OUT: 2350 mL    Total NET: -2350 mL      01-11-25 @ 07:01  -  01-12-25 @ 01:24  --------------------------------------------------------  IN:  Total IN: 0 mL    OUT:    Indwelling Catheter - Urethral (mL): 560 mL    Voided (mL): 220 mL  Total OUT: 780 mL    Total NET: -780 mL      Physical Exam  General: NAD, resting comfortably in bed  HEENT: Normocephalic atraumatic  Respiratory: Nonlabored respirations, normal CW expansion.  Cardio: S1S2, regular rate and rhythm.  Abdomen: softly distended, appropriately tender, portal and midline surgical incisions are c/d/i. Drain dressing c/di. Ulices Drain with serosang fluid present.   Vascular: extremities are warm and well perfused.       Labs:                        10.1   9.54  )-----------( 351      ( 12 Jan 2025 00:21 )             33.9   01-12    138  |  101  |  15  ----------------------------<  189[H]  3.8   |  26  |  0.79    Ca    8.6      12 Jan 2025 00:21  Phos  2.6     01-12  Mg     2.0     01-12    TPro  6.2  /  Alb  2.9[L]  /  TBili  0.3  /  DBili  x   /  AST  23  /  ALT  25  /  AlkPhos  68  01-12    CAPILLARY BLOOD GLUCOSE      POCT Blood Glucose.: 224 mg/dL (11 Jan 2025 21:58)  POCT Blood Glucose.: 217 mg/dL (11 Jan 2025 16:27)  POCT Blood Glucose.: 189 mg/dL (11 Jan 2025 09:50)  POCT Blood Glucose.: 226 mg/dL (11 Jan 2025 05:45)  POCT Blood Glucose.: 294 mg/dL (11 Jan 2025 02:34)      Medications:   MEDICATIONS  (STANDING):  chlorhexidine 2% Cloths 1 Application(s) Topical <User Schedule>  enoxaparin Injectable 40 milliGRAM(s) SubCutaneous every 24 hours  insulin glargine Injectable (LANTUS) 10 Unit(s) SubCutaneous at bedtime  insulin lispro (ADMELOG) corrective regimen sliding scale   SubCutaneous at bedtime  insulin lispro (ADMELOG) corrective regimen sliding scale   SubCutaneous three times a day before meals  lidocaine   4% Patch 1 Patch Transdermal every 24 hours  potassium phosphate IVPB 30 milliMole(s) IV Intermittent once    MEDICATIONS  (PRN):  HYDROmorphone  Injectable 0.5 milliGRAM(s) IV Push every 3 hours PRN breakthrough pain  oxyCODONE    IR 5 milliGRAM(s) Oral every 4 hours PRN Moderate Pain (4 - 6)  oxyCODONE    IR 10 milliGRAM(s) Oral every 4 hours PRN Severe Pain (7 - 10)      Imaging:

## 2025-01-13 DIAGNOSIS — E11.65 TYPE 2 DIABETES MELLITUS WITH HYPERGLYCEMIA: ICD-10-CM

## 2025-01-13 LAB
ALBUMIN SERPL ELPH-MCNC: 3.1 G/DL — LOW (ref 3.3–5)
ALP SERPL-CCNC: 89 U/L — SIGNIFICANT CHANGE UP (ref 40–120)
ALT FLD-CCNC: 31 U/L — SIGNIFICANT CHANGE UP (ref 10–45)
AMYLASE FLD-CCNC: 361 U/L — SIGNIFICANT CHANGE UP
ANION GAP SERPL CALC-SCNC: 11 MMOL/L — SIGNIFICANT CHANGE UP (ref 5–17)
AST SERPL-CCNC: 28 U/L — SIGNIFICANT CHANGE UP (ref 10–40)
BILIRUB SERPL-MCNC: 0.3 MG/DL — SIGNIFICANT CHANGE UP (ref 0.2–1.2)
BUN SERPL-MCNC: 9 MG/DL — SIGNIFICANT CHANGE UP (ref 7–23)
CALCIUM SERPL-MCNC: 8.6 MG/DL — SIGNIFICANT CHANGE UP (ref 8.4–10.5)
CHLORIDE SERPL-SCNC: 101 MMOL/L — SIGNIFICANT CHANGE UP (ref 96–108)
CO2 SERPL-SCNC: 25 MMOL/L — SIGNIFICANT CHANGE UP (ref 22–31)
CREAT SERPL-MCNC: 0.7 MG/DL — SIGNIFICANT CHANGE UP (ref 0.5–1.3)
EGFR: 117 ML/MIN/1.73M2 — SIGNIFICANT CHANGE UP
GLUCOSE BLDC GLUCOMTR-MCNC: 141 MG/DL — HIGH (ref 70–99)
GLUCOSE BLDC GLUCOMTR-MCNC: 146 MG/DL — HIGH (ref 70–99)
GLUCOSE BLDC GLUCOMTR-MCNC: 158 MG/DL — HIGH (ref 70–99)
GLUCOSE BLDC GLUCOMTR-MCNC: 168 MG/DL — HIGH (ref 70–99)
GLUCOSE SERPL-MCNC: 146 MG/DL — HIGH (ref 70–99)
HCT VFR BLD CALC: 34.7 % — LOW (ref 39–50)
HGB BLD-MCNC: 10.6 G/DL — LOW (ref 13–17)
INSULIN ANTIBODIES: <5 UU/ML — SIGNIFICANT CHANGE UP
MAGNESIUM SERPL-MCNC: 2.1 MG/DL — SIGNIFICANT CHANGE UP (ref 1.6–2.6)
MCHC RBC-ENTMCNC: 20.4 PG — LOW (ref 27–34)
MCHC RBC-ENTMCNC: 30.5 G/DL — LOW (ref 32–36)
MCV RBC AUTO: 66.9 FL — LOW (ref 80–100)
NRBC # BLD: 0 /100 WBCS — SIGNIFICANT CHANGE UP (ref 0–0)
PHOSPHATE SERPL-MCNC: 2.9 MG/DL — SIGNIFICANT CHANGE UP (ref 2.5–4.5)
PLATELET # BLD AUTO: 400 K/UL — SIGNIFICANT CHANGE UP (ref 150–400)
POTASSIUM SERPL-MCNC: 3.8 MMOL/L — SIGNIFICANT CHANGE UP (ref 3.5–5.3)
POTASSIUM SERPL-SCNC: 3.8 MMOL/L — SIGNIFICANT CHANGE UP (ref 3.5–5.3)
PROT SERPL-MCNC: 6.6 G/DL — SIGNIFICANT CHANGE UP (ref 6–8.3)
RBC # BLD: 5.19 M/UL — SIGNIFICANT CHANGE UP (ref 4.2–5.8)
RBC # FLD: 16.3 % — HIGH (ref 10.3–14.5)
SODIUM SERPL-SCNC: 137 MMOL/L — SIGNIFICANT CHANGE UP (ref 135–145)
WBC # BLD: 9.92 K/UL — SIGNIFICANT CHANGE UP (ref 3.8–10.5)
WBC # FLD AUTO: 9.92 K/UL — SIGNIFICANT CHANGE UP (ref 3.8–10.5)

## 2025-01-13 PROCEDURE — 99232 SBSQ HOSP IP/OBS MODERATE 35: CPT | Mod: GC

## 2025-01-13 RX ORDER — KETOROLAC TROMETHAMINE 30 MG/ML
15 INJECTION INTRAMUSCULAR; INTRAVENOUS EVERY 6 HOURS
Refills: 0 | Status: DISCONTINUED | OUTPATIENT
Start: 2025-01-13 | End: 2025-01-14

## 2025-01-13 RX ORDER — FUROSEMIDE 20 MG
20 TABLET ORAL ONCE
Refills: 0 | Status: COMPLETED | OUTPATIENT
Start: 2025-01-13 | End: 2025-01-13

## 2025-01-13 RX ORDER — ERGOCALCIFEROL 1.25 MG/1
50000 CAPSULE ORAL
Refills: 0 | Status: DISCONTINUED | OUTPATIENT
Start: 2025-01-13 | End: 2025-01-14

## 2025-01-13 RX ORDER — SOD PHOS DI, MONO/K PHOS MONO 250 MG
1 TABLET ORAL ONCE
Refills: 0 | Status: COMPLETED | OUTPATIENT
Start: 2025-01-13 | End: 2025-01-13

## 2025-01-13 RX ADMIN — Medication 10 MILLIGRAM(S): at 07:37

## 2025-01-13 RX ADMIN — ACETAMINOPHEN 975 MILLIGRAM(S): 80 SOLUTION/ DROPS ORAL at 05:14

## 2025-01-13 RX ADMIN — Medication 10 MILLIGRAM(S): at 11:39

## 2025-01-13 RX ADMIN — LIDOCAINE 1 PATCH: 50 OINTMENT TOPICAL at 00:27

## 2025-01-13 RX ADMIN — KETOROLAC TROMETHAMINE 15 MILLIGRAM(S): 30 INJECTION INTRAMUSCULAR; INTRAVENOUS at 22:26

## 2025-01-13 RX ADMIN — ACETAMINOPHEN 975 MILLIGRAM(S): 80 SOLUTION/ DROPS ORAL at 23:28

## 2025-01-13 RX ADMIN — LIDOCAINE 1 PATCH: 50 OINTMENT TOPICAL at 11:37

## 2025-01-13 RX ADMIN — Medication 10 MILLIGRAM(S): at 12:09

## 2025-01-13 RX ADMIN — KETOROLAC TROMETHAMINE 15 MILLIGRAM(S): 30 INJECTION INTRAMUSCULAR; INTRAVENOUS at 15:44

## 2025-01-13 RX ADMIN — ENOXAPARIN SODIUM 40 MILLIGRAM(S): 60 INJECTION INTRAVENOUS; SUBCUTANEOUS at 11:41

## 2025-01-13 RX ADMIN — Medication 20 MILLIGRAM(S): at 15:15

## 2025-01-13 RX ADMIN — Medication 10 MILLIGRAM(S): at 08:07

## 2025-01-13 RX ADMIN — ACETAMINOPHEN 975 MILLIGRAM(S): 80 SOLUTION/ DROPS ORAL at 05:44

## 2025-01-13 RX ADMIN — LIDOCAINE 1 PATCH: 50 OINTMENT TOPICAL at 06:58

## 2025-01-13 RX ADMIN — Medication 2: at 12:21

## 2025-01-13 RX ADMIN — ACETAMINOPHEN 975 MILLIGRAM(S): 80 SOLUTION/ DROPS ORAL at 12:09

## 2025-01-13 RX ADMIN — CHLORHEXIDINE GLUCONATE 1 APPLICATION(S): 1.2 RINSE ORAL at 07:37

## 2025-01-13 RX ADMIN — ACETAMINOPHEN 975 MILLIGRAM(S): 80 SOLUTION/ DROPS ORAL at 18:17

## 2025-01-13 RX ADMIN — ACETAMINOPHEN 975 MILLIGRAM(S): 80 SOLUTION/ DROPS ORAL at 00:21

## 2025-01-13 RX ADMIN — ACETAMINOPHEN 975 MILLIGRAM(S): 80 SOLUTION/ DROPS ORAL at 17:47

## 2025-01-13 RX ADMIN — KETOROLAC TROMETHAMINE 15 MILLIGRAM(S): 30 INJECTION INTRAMUSCULAR; INTRAVENOUS at 15:14

## 2025-01-13 RX ADMIN — KETOROLAC TROMETHAMINE 15 MILLIGRAM(S): 30 INJECTION INTRAMUSCULAR; INTRAVENOUS at 22:00

## 2025-01-13 RX ADMIN — INSULIN GLARGINE-YFGN 10 UNIT(S): 100 INJECTION, SOLUTION SUBCUTANEOUS at 21:59

## 2025-01-13 RX ADMIN — ACETAMINOPHEN 975 MILLIGRAM(S): 80 SOLUTION/ DROPS ORAL at 11:41

## 2025-01-13 RX ADMIN — LIDOCAINE 1 PATCH: 50 OINTMENT TOPICAL at 23:29

## 2025-01-13 RX ADMIN — Medication 1: at 21:59

## 2025-01-13 RX ADMIN — Medication 1 TABLET(S): at 11:39

## 2025-01-13 NOTE — PROGRESS NOTE ADULT - ATTENDING COMMENTS
44 year old male with past medical history of hypertension who presented to the hospital with hypoglycemia. 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  He remains on D10, diazoxide, octreotide.   EUS pending.  Pt continues to have mildly low BG, lowest BG is 69. He is currently on Diazoxide 150 mg PO TID, would recommend increasing the dose to 200 mg PO TID (Max 1200 mg/day is recommended)   octreotide increased to 300 mg PO TID already.    c/w D10 as per primary team   rest of the plan as per fellow's note.      complex case requiring complex decision making
Await EUS/FNA today to determine extent of distal pancreatectomy.  Tentatively will plan for surgery Friday 01/10/2025 at [facility].  Will initiate transfer after EUS.
Preop for robotic distal pancreatectomy/splenectomy tomorrow.
Follow up MR abdomen.  Advanced GI consult for EUS/FNA.  Plan for robotic distal pancreatectomy/splenectomy this admission.  Extent of resection to be determined by diagnostic workup.  Discussed with the patent.
GET JEFFRIES is a 44y Male with PMH of  no PMH Admitted for hypotension and diarrhea . MICU admission on 1/3 for hypoglycemia found to have a pancreatic tail mass as well as another pancreatic lesion     # Symptomatic Hypoglycemia  # Pancreatic Mass  - Q4 glucose checks  - Diazoxide  - Octreotide  - CT CHEST for evaluation for mets  - Surgery eval noted, MRI pending, will need EUS for evaluating the more central lesion, GI eval requested to arrange  - Dextrose 10% being infused via 2 separate lines. Will titrate down as tolerated    # DVT ppx - Enoxaparin   # GOC - full code .
GET JEFFRIES is a 44y Male with PMH of  no PMH Admitted for hypotension and diarrhea . 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 MEN? will await US and endocrine  - Surgery eval noted, MRI pending, will need EUS for evaluating the more central lesion, GI eval requested to arrange  - Dextrose 10% being infused via 2 separate lines. Will titrate down as tolerated  - Offered Cental line for D20 pt deferred, will consider if not improved.   Pulm: ANCELMO with out hypercapnia nonted here in MICU  - Will inform anesthesia for additional precautions other wise should be managed out pt .   # DVT ppx - Enoxaparin   # GOC - full code .
ON: no major events    HCT negative  poor sleep last night  wakes up and follows command  2 lit NC  AM CXR no acute disease  stable hemodynamics  home amlodipine on hold  lactate down to 3  advance to clears  protonix  trend drain amylase  driscoll, 2.5 lit uop, balance slight pos 500cc  IV fluids  chem vte ppx on hold, discuss starting with primary   cefotetan post operative  afebrile  s/p insulinoma resection, q4 SSI , downtrend hyperglycemia  c peptide pending  endorcine following  PT OT to see    dc CVC, left radial a line dc
Patient seen and examined with ICU team at bedside during rounds after lab data, medical records and radiology reports reviewed. I have read and agreeable in general with the documented note, assessment, and management plan which reflects my opinions from bedside rounds.      GET JEFFRIES is a 44y Male with PMH of  no PMH Admitted for hypotension and diarrhea . MICU admission on 1/3 for hypoglycemia found to have a pancreatic tail mass.     - admitted to medicine required significant glucose supplementation    family history of lung cancer  CT head negative        # Hypoglycemia  # Pancreatic Mass  - Q1 glucose checks  - Endocrine consult appreciated, full to follow  - hypoglycemic serum workup sent  - MR- Liver and MRCP ordered  - TSH/T4 added    # DVT ppx - Enoxaparin   # GOC - full code      This patient is critically ill and required frequent bedside visits with therapy change. I have personally provided 36 minutes of critical care time concurrently with the resident/fellow/NP/PA. This time excludes time spent on separate procedures and time spent teaching. I have reviewed theresident/fellow/NP/PA’s documentation and I agree with the assessment and plan of care.    Mohsen Cedeño MD  Interventional Pulmonology & Critical Care Medicine
Pending MRCP results.  Advanced GI consult.  Distal pancreatectomy this admission upon diagnostic completion.
As above    Pt seen/examined in early evening  Discussed with GI fellow earlier in the day.    Impression:    #1.  Pancreatic body cyst, s/p EUS/FNA  #2.  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  Await cytology/pancreas fluid CEA/Interpace genetic markers  #2.  Surgical oncology followup   #3.  GI advanced endoscopy consult team will sign off / call for questions / concerns.
EUS today.  Start transfer to [facility] in preparation for robotic distal pancreatectomy possible splenectomy Friday 01/10/2025.
GET JEFFRIES is a 44y Male with PMH of  no PMH Admitted for hypotension and diarrhea . MICU admission on 1/3 for hypoglycemia found to have a pancreatic tail mass as well as another pancreatic lesion     # Hypoglycemia  # Pancreatic Mass  - Q1 glucose checks, will discuss with nephrology about reducing frequency  - Started on diazoxide  - Surgery eval noted, MRI pending, will need EUS for evaluating the more central lesion, GI eval requested to arrange  - Dextrose 10% being infused via 2 separate lines at 100cc/hr each    # DVT ppx - Enoxaparin   # GOC - full code
GET JEFFRIES is a 44y Male with PMH of  no PMH Admitted for hypotension and diarrhea . 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   - Diazoxide @ 200q8  - Octreotide@ 300q8  - CT CHEST negative for mets, but notable for thyroid nodule? MEN? will await US and endocrine  - Surgery planed for Friday transfer requested and accepted to SICU at [facility] with no bed availability.     Pulm: ANCELMO with out hypercapnia nonted here in MICU  - Will inform anesthesia for additional precautions other wise should be managed out pt .   # Thyroid nodule suspicious appearance on US will need biopsy at some point.    # DVT ppx - Enoxaparin   # GOC - full code.
OR today.
GET JEFFRIES is a 44y Male with PMH of  no PMH Admitted for hypotension and diarrhea . MICU admission on 1/3 for hypoglycemia found to have a pancreatic tail mass as well as another pancreatic lesion     Significantly improved with stable glucose, labs and vitals  Medically optimized necessary urgent non emergent surgery.      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 D10 dose lowered  - Diazoxide @ 300q8  - Octreotide@ 300q8  - Surgery planed for Friday now in house due to [facility] bed availability.   # superficial thrombofphlebitis from d10 extravesation: keep arms elevated and warm compress.   Pulm: ANCELMO with out hypercapnia noted here in MICU with sat nadirs as low as 70.   -- Will inform anesthesia for additional precautions  and possible positive pressure post extubation, other wise should be managed out pt .  -- Requires outpatient Sleep FUP with a sleep study. Please call [phone] or email  [email or web address] for an appointment at the Pulmonary office ([address], [address], [place], [place]).    # Thyroid nodule suspicious appearance on US will need biopsy at some point.  ? MEN1?  # DVT ppx - Enoxaparin   # GOC - full code.
44 yr male with PMHx of HTN presented with hypoglycemia, found to have elevated insulin, c-peptide, and suppressed BHB with low glucose level, most consistent with insulinoma. Noted to have 4 cm lesion in pancreatic tail and indeterminate 2 cm lesion in pancreatic body. Recommend further imaging with MRI abdomen and CT chest with IV contrast. Recommend involving surgical oncology as first line treatment of insulinoma is surgical resection. Continue supportive care with dextrose IVF and start diazoxide. Patient will need f/u with medical genetics outpatient also. Complex patient, complex decision making
GET JEFFRIES is a 44y Male with PMH of  no PMH Admitted for hypotension and diarrhea . 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 D10 dose lowered  - Diazoxide @ 300q8  - Octreotide@ 300q8  - Surgery planed for Friday now in house due to [facility] bed availability.   # superficial thrombofphlebitis from d10 extravesation: keep arms elevated and warm compress.   Pulm: ANCELMO with out hypercapnia noted here in MICU with sat nadirs as low as 70.   - Will inform anesthesia for additional precautions  and possible positive pressure post extubation, other wise should be managed out pt .  - -Requires outpatient Sleep FUP with a sleep study. Please call [phone] or email  [email or web address] for an appointment at the Pulmonary office ([address], [address], [place], [place]).    # Thyroid nodule suspicious appearance on US will need biopsy at some point.  ? MEN1?  # DVT ppx - Enoxaparin   # GOC - full code.
GET JEFFRIES is a 44y Male with PMH of  no PMH Admitted for hypotension and diarrhea . 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   - Diazoxide increased to 200q8  - Octreotide Increased to 300q8  - CT CHEST negative for mets, but notable for thyroid nodule? MEN? will await US and endocrine  - Surgery josé antonio noted transfer to [facility] SICU after endoscopy today, with plan for Or Friday   - Dextrose 10/NS and D20 running .   Pulm: ANCELMO with out hypercapnia nonted here in MICU  - Will inform anesthesia for additional precautions other wise should be managed out pt .   # DVT ppx - Enoxaparin   # GOC - full code.
44 year old male with past medical history of hypertension who presented to the hospital with hypoglycemia. 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  He remains diazoxide, octreotide.   EUS pending.  BG have remained stable, but pt is now on D20.    Would recommend increasing Diazoxide 300 mg PO TID (up to 1200 mg/day can be used)   c/w octreotide increased to 300 mg PO TID already.    c/w dextrose fluid as per primary team   rest of the plan as per fellow's note.      complex case requiring complex decision making.
Patient is a 44-year-old man with past medical history of hypertension, presented to the hospital with severe hypoglycemia.  Found to have hypoglycemia with inappropriately elevated C-peptide level.  Status post EUS of pancreatic mass.  Pending surgical intervention 1/10/2025 at [facility].  Discussed the potential needs of insulin therapy in the future if patient is status post pancreatectomy.  Patient reported that his mother had a history of renal transplant x 2, currently not on dialysis, noted to have elevated PTH and normal calcium level.  Will recommend obtaining parathyroid hormone level for this patient while inpatient.  Also noted to have an incidental finding of thyroid nodule, 1.9 cm, meeting criteria for FNA.  Can proceed with FNA if patient is still in-patient and clinically stable after surgery versus outpatient.
Agree with assessment and plan as above by Dr. Serrato. Reviewed all pertinent labs, glucose values, and imaging studies. Modifications made as indicated above. Pt. with insulin mediated hypoglycemia from insulinoma awaiting surgical resection. For now continue with dextrose, octreotide and diazoxide. Plan to d/c after surgery as hypoglycemia likely to improve. Thyroid nodule FNA can be done as outpatient. Follow-up Vit D levels to r/o secondary hyperparathyroidism.     Jozef Moran D.O  [phone]
Agree with assessment and plan as above by Dr. Serrato. Reviewed all pertinent labs, glucose values, and imaging studies. Modifications made as indicated above. Pt. with insulin mediated hypoglycemia from insulinoma s/p surgical resection. Now off dextrose, diazoxide, and sandostatin post-op with hyperglycemia. Started on basal + correction. Dietary and lifestyle modifications discussed. Plan to d/c on metformin if glucose remains above goal on insulin. Thyroid nodule FNA can be done as outpatient. Vit D level low likely contributing to secondary hyperparathyroidism.     Jozef Moran D.O  [phone] .
Agree with assessment and plan as above by Dr. Serrato. Reviewed all pertinent labs, glucose values, and imaging studies. Modifications made as indicated above. Pt. with insulin mediated hypoglycemia from insulinoma s/p surgical resection. Plan to d/c dextrose, diazoxide, and sandostatin post-op as hypoglycemia likely to improve. Thyroid nodule FNA can be done as outpatient. Follow-up Vit D levels to r/o secondary hyperparathyroidism.     Jozef Moran D.O  [phone] .

## 2025-01-13 NOTE — PROGRESS NOTE ADULT - SUBJECTIVE AND OBJECTIVE BOX
Admitted for: Hypoglycemia        Following for: Insulinoma post resection, hyperglycemia    Subjective:   Patient has been on full liquid diet, has been drinking a lot of juice        MEDICATIONS  (STANDING):  acetaminophen     Tablet .. 975 milliGRAM(s) Oral every 6 hours  chlorhexidine 2% Cloths 1 Application(s) Topical <User Schedule>  enoxaparin Injectable 40 milliGRAM(s) SubCutaneous every 24 hours  ergocalciferol 48814 Unit(s) Oral every week  insulin glargine Injectable (LANTUS) 10 Unit(s) SubCutaneous at bedtime  insulin lispro (ADMELOG) corrective regimen sliding scale   SubCutaneous at bedtime  insulin lispro (ADMELOG) corrective regimen sliding scale   SubCutaneous three times a day before meals  ketorolac   Injectable 15 milliGRAM(s) IV Push every 6 hours  lidocaine   4% Patch 1 Patch Transdermal every 24 hours    MEDICATIONS  (PRN):  oxyCODONE    IR 5 milliGRAM(s) Oral every 4 hours PRN Moderate Pain (4 - 6)  oxyCODONE    IR 10 milliGRAM(s) Oral every 4 hours PRN Severe Pain (7 - 10)      Allergies    No Known Allergies    Intolerances          PHYSICAL EXAM:  VITALS: T(C): 36.8 (01-13-25 @ 13:43)  T(F): 98.2 (01-13-25 @ 13:43), Max: 99.8 (01-12-25 @ 23:38)  HR: 96 (01-13-25 @ 13:43) (96 - 102)  BP: 155/89 (01-13-25 @ 13:43) (124/78 - 155/89)  RR:  (17 - 18)  SpO2:  (92% - 94%)  Wt(kg): --  GENERAL: NAD, obese  EYES: No proptosis, no lid lag, anicteric  RESPIRATORY: no respiratory distress  CARDIOVASCULAR: no edema  GI: AARON drain in place  EXT: SAUCEDO  PSYCH: Alert and oriented x 3, reactive affect      A1C with Estimated Average Glucose Result: 4.9 % (01-03-25 @ 06:20)      POCT Blood Glucose.: 168 mg/dL (01-13-25 @ 12:18)  POCT Blood Glucose.: 141 mg/dL (01-13-25 @ 07:55)  POCT Blood Glucose.: 177 mg/dL (01-12-25 @ 21:13)  POCT Blood Glucose.: 190 mg/dL (01-12-25 @ 17:09)  POCT Blood Glucose.: 172 mg/dL (01-12-25 @ 12:32)  POCT Blood Glucose.: 172 mg/dL (01-12-25 @ 07:59)  POCT Blood Glucose.: 224 mg/dL (01-11-25 @ 21:58)  POCT Blood Glucose.: 217 mg/dL (01-11-25 @ 16:27)  POCT Blood Glucose.: 189 mg/dL (01-11-25 @ 09:50)  POCT Blood Glucose.: 226 mg/dL (01-11-25 @ 05:45)  POCT Blood Glucose.: 294 mg/dL (01-11-25 @ 02:34)  POCT Blood Glucose.: 303 mg/dL (01-10-25 @ 23:57)  POCT Blood Glucose.: 279 mg/dL (01-10-25 @ 22:05)  POCT Blood Glucose.: 266 mg/dL (01-10-25 @ 20:15)      01-13    137  |  101  |  9   ----------------------------<  146[H]  3.8   |  25  |  0.70    eGFR: 117    Ca    8.6      01-13  Mg     2.1     01-13  Phos  2.9     01-13    TPro  6.6  /  Alb  3.1[L]  /  TBili  0.3  /  DBili  x   /  AST  28  /  ALT  31  /  AlkPhos  89  01-13      Thyroid Function Tests:  01-03 @ 04:55 TSH 3.50 FreeT4 1.3 T3 -- Anti TPO -- Anti Thyroglobulin Ab -- TSI --  01-02 @ 15:54 TSH 1.60 FreeT4 -- T3 -- Anti TPO -- Anti Thyroglobulin Ab -- TSI --

## 2025-01-13 NOTE — PROGRESS NOTE ADULT - ATTENDING SUPERVISION STATEMENT
Fellow
Resident
Resident
Fellow
Resident
Fellow
Resident
Fellow
Resident
Fellow

## 2025-01-13 NOTE — PROGRESS NOTE ADULT - ASSESSMENT
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 No family hx of DM. Obese with BMI of 42.3.  C-peptide post-surgery was 1.7 with glucose 186; there is sufficient insulin production. Low suspicion for type 3c DM or type 1 DM.  Recommendations:  - Nutrition consult. Discussed with patient that he no longer needs to drink juice all the time.  - Continue Lantus 10 units qhs  - Continue low dose admelog scale TIDAC, low dose scale QHS  - CC diet, advance as tolerated  - Discharge planning: Likely metformin 500mg BID x 7 days, followed by 1000mg BID. Counseled on diet and exercise. Although may benefit from GLP1RA given BMI 42.3, will avoid GLP1RA for now given recent pancreatic resection.    #Hypoglycemia secondary to insulinoma, s/p resection  - CT abd/pel with PANCREAS: Lobulated enhancing mass at the pancreatic tail measuring 4.3 x 3.7 cm, with abutment of the left kidney at the posterior margin. Additional hypoattenuating lesion in the pancreatic body measuring 2.1 x 1.8 cm.  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 There is also a 2.1 x 2.4 cm septated cystic lesion within the pancreatic body without appreciable solid component, which is nonspecific and could represent a mucinous cystadenoma, side branch intraductal papillary mucinous neoplasm (IPMN), or pseudocyst.  - Clinical picture highly suspicious for insulinoma given labs and imaging  - AM cortisol 12.6 1/4/25, rules out adrenal insufficiency  - Sulfonylurea panel negative  - Neuroendocrine tumor markers: Chromogranin 24.1, Gastrin 19 wnl  - s/p Pancreatectomy, distal, robot-assisted, with splenectomy  - Hypoglycemia resolved  Recommendations:  - Outpatient genetic testing     #Elevated PTH  PTHi elevated to 199 with corrected calcium of 8.6, low phos 2.2  Vitamin D 25OH low at 12.7  Likely secondary hyperparathyroidism and less likely primary hyperparathyroid/parathyroid hyperplasia   - Start vitamin D repletion: 50,000 IU weekly x 4 weeks, followed by 2000 IU daily   - Repeat vitamin D 25OH, PTH, CMP in around 6 weeks outpatient    #Thyroid nodule  Incidental finding on CT chest  US performed 1/7/25: Right thyroid nodule: --Nodule 1: - In the medial lower pole/isthmus, measuring 2.0 x 1.0 x 2.1   cm predominantly solid, hypoechoic, TIRADS 4. Moderate vascularity is demonstrated within this nodule with color Doppler.  PLAN:  - FNA indicated, can be done outpatient at office visit or inpatient to expedite care.     #HTN  Management per primary team, currently on amlodipine 7.5 mg qd    Patient will need endocrinology follow up after discharge. Will arrange for follow up at [address] [place] [address] endocrinology ([phone]).        Endy Serrato MD  Endocrine Fellow  Can be reached via teams. For follow up questions, discharge recommendations, or new consults, please call answering service at [phone] (weekdays); [phone] (nights/weekends).

## 2025-01-13 NOTE — PROGRESS NOTE ADULT - SUBJECTIVE AND OBJECTIVE BOX
[facility] Team Surgery Daily Progress Note     Interval Events:  - Transferred from SICU to floor    SUBJECTIVE: Pt seen at bedside this morning.    Physical Exam:  General Appearance: Appears well, NAD  Neck: Supple  Chest: Equal expansion bilaterally, equal breath sounds  CV: Pulse regular presently  Abdomen: Soft, nondistended, appropriate incisional tenderness, dressings clean and dry and intact  Extremities: Grossly symmetric, SCD's in place     Vital Signs Last 24 Hrs  T(C): 37.7 (12 Jan 2025 23:38), Max: 37.7 (12 Jan 2025 23:38)  T(F): 99.8 (12 Jan 2025 23:38), Max: 99.8 (12 Jan 2025 23:38)  HR: 100 (12 Jan 2025 23:38) (94 - 104)  BP: 133/72 (12 Jan 2025 23:38) (115/60 - 133/72)  BP(mean): 82 (12 Jan 2025 05:00) (82 - 82)  RR: 18 (12 Jan 2025 23:38) (17 - 19)  SpO2: 92% (12 Jan 2025 23:38) (90% - 94%)    Parameters below as of 12 Jan 2025 23:38  Patient On (Oxygen Delivery Method): room air        I&O's Summary    11 Jan 2025 07:01  -  12 Jan 2025 07:00  --------------------------------------------------------  IN: 1719.2 mL / OUT: 1370 mL / NET: 349.2 mL    12 Jan 2025 07:01  -  13 Jan 2025 01:21  --------------------------------------------------------  IN: 450 mL / OUT: 1270 mL / NET: -820 mL          LABS:                        10.1   9.54  )-----------( 351      ( 12 Jan 2025 00:21 )             33.9     01-12    138  |  101  |  15  ----------------------------<  189[H]  3.8   |  26  |  0.79    Ca    8.6      12 Jan 2025 00:21  Phos  2.6     01-12  Mg     2.0     01-12    TPro  6.2  /  Alb  2.9[L]  /  TBili  0.3  /  DBili  x   /  AST  23  /  ALT  25  /  AlkPhos  68  01-12    PT/INR - ( 12 Jan 2025 00:21 )   PT: 13.4 sec;   INR: 1.17 ratio         PTT - ( 12 Jan 2025 00:21 )  PTT:28.1 sec  Urinalysis Basic - ( 12 Jan 2025 00:21 )    Color: x / Appearance: x / SG: x / pH: x  Gluc: 189 mg/dL / Ketone: x  / Bili: x / Urobili: x   Blood: x / Protein: x / Nitrite: x   Leuk Esterase: x / RBC: x / WBC x   Sq Epi: x / Non Sq Epi: x / Bacteria: x        RADIOLOGY & ADDITIONAL STUDIES:

## 2025-01-14 ENCOUNTER — TRANSCRIPTION ENCOUNTER (OUTPATIENT)
Age: 45
End: 2025-01-14

## 2025-01-14 LAB
ALBUMIN SERPL ELPH-MCNC: 3 G/DL — LOW (ref 3.3–5)
ALP SERPL-CCNC: 103 U/L — SIGNIFICANT CHANGE UP (ref 40–120)
ALT FLD-CCNC: 39 U/L — SIGNIFICANT CHANGE UP (ref 10–45)
AMYLASE FLD-CCNC: 700 U/L — SIGNIFICANT CHANGE UP
ANION GAP SERPL CALC-SCNC: 17 MMOL/L — SIGNIFICANT CHANGE UP (ref 5–17)
AST SERPL-CCNC: 38 U/L — SIGNIFICANT CHANGE UP (ref 10–40)
BASOPHILS # BLD AUTO: 0.08 K/UL — SIGNIFICANT CHANGE UP (ref 0–0.2)
BASOPHILS NFR BLD AUTO: 0.9 % — SIGNIFICANT CHANGE UP (ref 0–2)
BILIRUB DIRECT SERPL-MCNC: <0.1 MG/DL — SIGNIFICANT CHANGE UP (ref 0–0.3)
BILIRUB INDIRECT FLD-MCNC: >0.5 MG/DL — SIGNIFICANT CHANGE UP (ref 0.2–1)
BILIRUB SERPL-MCNC: 0.6 MG/DL — SIGNIFICANT CHANGE UP (ref 0.2–1.2)
BUN SERPL-MCNC: 11 MG/DL — SIGNIFICANT CHANGE UP (ref 7–23)
CALCIUM SERPL-MCNC: 8.7 MG/DL — SIGNIFICANT CHANGE UP (ref 8.4–10.5)
CHLORIDE SERPL-SCNC: 101 MMOL/L — SIGNIFICANT CHANGE UP (ref 96–108)
CO2 SERPL-SCNC: 18 MMOL/L — LOW (ref 22–31)
CREAT SERPL-MCNC: 0.7 MG/DL — SIGNIFICANT CHANGE UP (ref 0.5–1.3)
EGFR: 117 ML/MIN/1.73M2 — SIGNIFICANT CHANGE UP
EOSINOPHIL # BLD AUTO: 0.45 K/UL — SIGNIFICANT CHANGE UP (ref 0–0.5)
EOSINOPHIL NFR BLD AUTO: 5.2 % — SIGNIFICANT CHANGE UP (ref 0–6)
GLUCOSE BLDC GLUCOMTR-MCNC: 127 MG/DL — HIGH (ref 70–99)
GLUCOSE BLDC GLUCOMTR-MCNC: 143 MG/DL — HIGH (ref 70–99)
GLUCOSE SERPL-MCNC: 112 MG/DL — HIGH (ref 70–99)
HCT VFR BLD CALC: 33.7 % — LOW (ref 39–50)
HGB BLD-MCNC: 10 G/DL — LOW (ref 13–17)
IMM GRANULOCYTES NFR BLD AUTO: 0.5 % — SIGNIFICANT CHANGE UP (ref 0–0.9)
LYMPHOCYTES # BLD AUTO: 1.31 K/UL — SIGNIFICANT CHANGE UP (ref 1–3.3)
LYMPHOCYTES # BLD AUTO: 15.1 % — SIGNIFICANT CHANGE UP (ref 13–44)
MAGNESIUM SERPL-MCNC: 2.2 MG/DL — SIGNIFICANT CHANGE UP (ref 1.6–2.6)
MCHC RBC-ENTMCNC: 19.8 PG — LOW (ref 27–34)
MCHC RBC-ENTMCNC: 29.7 G/DL — LOW (ref 32–36)
MCV RBC AUTO: 66.7 FL — LOW (ref 80–100)
MONOCYTES # BLD AUTO: 1.31 K/UL — HIGH (ref 0–0.9)
MONOCYTES NFR BLD AUTO: 15.1 % — HIGH (ref 2–14)
NEUTROPHILS # BLD AUTO: 5.46 K/UL — SIGNIFICANT CHANGE UP (ref 1.8–7.4)
NEUTROPHILS NFR BLD AUTO: 63.2 % — SIGNIFICANT CHANGE UP (ref 43–77)
NRBC # BLD: 0 /100 WBCS — SIGNIFICANT CHANGE UP (ref 0–0)
PHOSPHATE SERPL-MCNC: 3.9 MG/DL — SIGNIFICANT CHANGE UP (ref 2.5–4.5)
PLATELET # BLD AUTO: 465 K/UL — HIGH (ref 150–400)
POTASSIUM SERPL-MCNC: 4.3 MMOL/L — SIGNIFICANT CHANGE UP (ref 3.5–5.3)
POTASSIUM SERPL-SCNC: 4.3 MMOL/L — SIGNIFICANT CHANGE UP (ref 3.5–5.3)
PROT SERPL-MCNC: 6.5 G/DL — SIGNIFICANT CHANGE UP (ref 6–8.3)
RBC # BLD: 5.05 M/UL — SIGNIFICANT CHANGE UP (ref 4.2–5.8)
RBC # FLD: 16.7 % — HIGH (ref 10.3–14.5)
SODIUM SERPL-SCNC: 136 MMOL/L — SIGNIFICANT CHANGE UP (ref 135–145)
WBC # BLD: 8.65 K/UL — SIGNIFICANT CHANGE UP (ref 3.8–10.5)
WBC # FLD AUTO: 8.65 K/UL — SIGNIFICANT CHANGE UP (ref 3.8–10.5)

## 2025-01-14 PROCEDURE — 74177 CT ABD & PELVIS W/CONTRAST: CPT | Mod: MC

## 2025-01-14 PROCEDURE — G0480: CPT

## 2025-01-14 PROCEDURE — 71260 CT THORAX DX C+: CPT | Mod: MC

## 2025-01-14 PROCEDURE — 88360 TUMOR IMMUNOHISTOCHEM/MANUAL: CPT

## 2025-01-14 PROCEDURE — 93971 EXTREMITY STUDY: CPT

## 2025-01-14 PROCEDURE — 82803 BLOOD GASES ANY COMBINATION: CPT

## 2025-01-14 PROCEDURE — 97161 PT EVAL LOW COMPLEX 20 MIN: CPT

## 2025-01-14 PROCEDURE — S2900: CPT

## 2025-01-14 PROCEDURE — 84439 ASSAY OF FREE THYROXINE: CPT

## 2025-01-14 PROCEDURE — 84100 ASSAY OF PHOSPHORUS: CPT

## 2025-01-14 PROCEDURE — 82150 ASSAY OF AMYLASE: CPT

## 2025-01-14 PROCEDURE — 87637 SARSCOV2&INF A&B&RSV AMP PRB: CPT

## 2025-01-14 PROCEDURE — 97164 PT RE-EVAL EST PLAN CARE: CPT

## 2025-01-14 PROCEDURE — 83605 ASSAY OF LACTIC ACID: CPT

## 2025-01-14 PROCEDURE — 82330 ASSAY OF CALCIUM: CPT

## 2025-01-14 PROCEDURE — 83525 ASSAY OF INSULIN: CPT

## 2025-01-14 PROCEDURE — 80048 BASIC METABOLIC PNL TOTAL CA: CPT

## 2025-01-14 PROCEDURE — 84443 ASSAY THYROID STIM HORMONE: CPT

## 2025-01-14 PROCEDURE — 82941 ASSAY OF GASTRIN: CPT

## 2025-01-14 PROCEDURE — 83735 ASSAY OF MAGNESIUM: CPT

## 2025-01-14 PROCEDURE — 82533 TOTAL CORTISOL: CPT

## 2025-01-14 PROCEDURE — 99285 EMERGENCY DEPT VISIT HI MDM: CPT

## 2025-01-14 PROCEDURE — 82435 ASSAY OF BLOOD CHLORIDE: CPT

## 2025-01-14 PROCEDURE — 84295 ASSAY OF SERUM SODIUM: CPT

## 2025-01-14 PROCEDURE — 84132 ASSAY OF SERUM POTASSIUM: CPT

## 2025-01-14 PROCEDURE — 90734 MENACWYD/MENACWYCRM VACC IM: CPT

## 2025-01-14 PROCEDURE — 85730 THROMBOPLASTIN TIME PARTIAL: CPT

## 2025-01-14 PROCEDURE — 71045 X-RAY EXAM CHEST 1 VIEW: CPT

## 2025-01-14 PROCEDURE — 90648 HIB PRP-T VACCINE 4 DOSE IM: CPT

## 2025-01-14 PROCEDURE — 76536 US EXAM OF HEAD AND NECK: CPT

## 2025-01-14 PROCEDURE — 88342 IMHCHEM/IMCYTCHM 1ST ANTB: CPT

## 2025-01-14 PROCEDURE — 85610 PROTHROMBIN TIME: CPT

## 2025-01-14 PROCEDURE — 80076 HEPATIC FUNCTION PANEL: CPT

## 2025-01-14 PROCEDURE — C1889: CPT

## 2025-01-14 PROCEDURE — 85025 COMPLETE CBC W/AUTO DIFF WBC: CPT

## 2025-01-14 PROCEDURE — 74183 MRI ABD W/O CNTR FLWD CNTR: CPT | Mod: MC

## 2025-01-14 PROCEDURE — 84681 ASSAY OF C-PEPTIDE: CPT

## 2025-01-14 PROCEDURE — 97165 OT EVAL LOW COMPLEX 30 MIN: CPT

## 2025-01-14 PROCEDURE — 82570 ASSAY OF URINE CREATININE: CPT

## 2025-01-14 PROCEDURE — 90620 MENB-4C VACCINE IM: CPT

## 2025-01-14 PROCEDURE — 86901 BLOOD TYPING SEROLOGIC RH(D): CPT

## 2025-01-14 PROCEDURE — C9399: CPT

## 2025-01-14 PROCEDURE — 82306 VITAMIN D 25 HYDROXY: CPT

## 2025-01-14 PROCEDURE — 97116 GAIT TRAINING THERAPY: CPT

## 2025-01-14 PROCEDURE — 97530 THERAPEUTIC ACTIVITIES: CPT

## 2025-01-14 PROCEDURE — 88313 SPECIAL STAINS GROUP 2: CPT

## 2025-01-14 PROCEDURE — 82310 ASSAY OF CALCIUM: CPT

## 2025-01-14 PROCEDURE — 84586 ASSAY OF VIP: CPT

## 2025-01-14 PROCEDURE — 88173 CYTOPATH EVAL FNA REPORT: CPT

## 2025-01-14 PROCEDURE — A9585: CPT

## 2025-01-14 PROCEDURE — C1769: CPT

## 2025-01-14 PROCEDURE — 86850 RBC ANTIBODY SCREEN: CPT

## 2025-01-14 PROCEDURE — 70450 CT HEAD/BRAIN W/O DYE: CPT | Mod: MC

## 2025-01-14 PROCEDURE — 86337 INSULIN ANTIBODIES: CPT

## 2025-01-14 PROCEDURE — 85018 HEMOGLOBIN: CPT

## 2025-01-14 PROCEDURE — 83690 ASSAY OF LIPASE: CPT

## 2025-01-14 PROCEDURE — 90677 PCV20 VACCINE IM: CPT

## 2025-01-14 PROCEDURE — 82010 KETONE BODYS QUAN: CPT

## 2025-01-14 PROCEDURE — 85027 COMPLETE CBC AUTOMATED: CPT

## 2025-01-14 PROCEDURE — 86900 BLOOD TYPING SEROLOGIC ABO: CPT

## 2025-01-14 PROCEDURE — 81001 URINALYSIS AUTO W/SCOPE: CPT

## 2025-01-14 PROCEDURE — 85014 HEMATOCRIT: CPT

## 2025-01-14 PROCEDURE — 87086 URINE CULTURE/COLONY COUNT: CPT

## 2025-01-14 PROCEDURE — 82962 GLUCOSE BLOOD TEST: CPT

## 2025-01-14 PROCEDURE — 88341 IMHCHEM/IMCYTCHM EA ADD ANTB: CPT

## 2025-01-14 PROCEDURE — 83497 ASSAY OF 5-HIAA: CPT

## 2025-01-14 PROCEDURE — 83036 HEMOGLOBIN GLYCOSYLATED A1C: CPT

## 2025-01-14 PROCEDURE — 83970 ASSAY OF PARATHORMONE: CPT

## 2025-01-14 PROCEDURE — 86316 IMMUNOASSAY TUMOR OTHER: CPT

## 2025-01-14 PROCEDURE — 88309 TISSUE EXAM BY PATHOLOGIST: CPT

## 2025-01-14 PROCEDURE — 94640 AIRWAY INHALATION TREATMENT: CPT

## 2025-01-14 PROCEDURE — 84449 ASSAY OF TRANSCORTIN: CPT

## 2025-01-14 PROCEDURE — 36415 COLL VENOUS BLD VENIPUNCTURE: CPT

## 2025-01-14 PROCEDURE — 80053 COMPREHEN METABOLIC PANEL: CPT

## 2025-01-14 PROCEDURE — 88305 TISSUE EXAM BY PATHOLOGIST: CPT

## 2025-01-14 PROCEDURE — 84206 ASSAY OF PROINSULIN: CPT

## 2025-01-14 PROCEDURE — 82947 ASSAY GLUCOSE BLOOD QUANT: CPT

## 2025-01-14 PROCEDURE — 82378 CARCINOEMBRYONIC ANTIGEN: CPT

## 2025-01-14 RX ORDER — PNEUMOCOCCAL 20-VALENT CONJUGATE VACCINE 2.2; 2.2; 2.2; 2.2; 2.2; 2.2; 2.2; 2.2; 2.2; 2.2; 2.2; 2.2; 2.2; 2.2; 2.2; 2.2; 4.4; 2.2; 2.2; 2.2 UG/.5ML; UG/.5ML; UG/.5ML; UG/.5ML; UG/.5ML; UG/.5ML; UG/.5ML; UG/.5ML; UG/.5ML; UG/.5ML; UG/.5ML; UG/.5ML; UG/.5ML; UG/.5ML; UG/.5ML; UG/.5ML; UG/.5ML; UG/.5ML; UG/.5ML; UG/.5ML
0.5 INJECTION, SUSPENSION INTRAMUSCULAR ONCE
Refills: 0 | Status: COMPLETED | OUTPATIENT
Start: 2025-01-14 | End: 2025-01-14

## 2025-01-14 RX ORDER — NEISSERIA MENINGITIDIS GROUP A CAPSULAR POLYSACCHARIDE TETANUS TOXOID CONJUGATE ANTIGEN, NEISSERIA MENINGITIDIS GROUP C CAPSULAR POLYSACCHARIDE TETANUS TOXOID CONJUGATE ANTIGEN, NEISSERIA MENINGITIDIS GROUP Y CAPSULAR POLYSACCHARIDE TETANUS TOXOID CONJUGATE ANTIGEN, AND NEISSERIA MENINGITIDIS GROUP W-135 CAPSULAR POLYSACCHARIDE TETANUS TOXOID CONJUGATE ANTIGEN 10; 10; 10; 10 UG/.5ML; UG/.5ML; UG/.5ML; UG/.5ML
0.5 INJECTION, SOLUTION INTRAMUSCULAR ONCE
Refills: 0 | Status: COMPLETED | OUTPATIENT
Start: 2025-01-14 | End: 2025-01-14

## 2025-01-14 RX ORDER — NEISSERIA MENINGITIDIS SEROGROUP B NHBA FUSION PROTEIN ANTIGEN, NEISSERIA MENINGITIDIS SEROGROUP B FHBP FUSION PROTEIN ANTIGEN AND NEISSERIA MENINGITIDIS SEROGROUP B NADA PROTEIN ANTIGEN 50; 50; 50; 25 UG/.5ML; UG/.5ML; UG/.5ML; UG/.5ML
0.5 INJECTION, SUSPENSION INTRAMUSCULAR ONCE
Refills: 0 | Status: COMPLETED | OUTPATIENT
Start: 2025-01-14 | End: 2025-01-14

## 2025-01-14 RX ORDER — HAEMOPH B POLY CONJ-TET TOX/PF 10 MCG/0.5
0.5 VIAL (EA) INTRAMUSCULAR ONCE
Refills: 0 | Status: COMPLETED | OUTPATIENT
Start: 2025-01-14 | End: 2025-01-14

## 2025-01-14 RX ORDER — OXYCODONE HCL 15 MG
1 TABLET ORAL
Qty: 10 | Refills: 0
Start: 2025-01-14

## 2025-01-14 RX ORDER — METFORMIN 850 MG/1
1 TABLET ORAL
Qty: 14 | Refills: 0
Start: 2025-01-14 | End: 2025-01-20

## 2025-01-14 RX ORDER — METFORMIN 850 MG/1
1 TABLET ORAL
Qty: 60 | Refills: 0
Start: 2025-01-14 | End: 2025-02-12

## 2025-01-14 RX ADMIN — PNEUMOCOCCAL 20-VALENT CONJUGATE VACCINE 0.5 MILLILITER(S)
2.2; 2.2; 2.2; 2.2; 2.2; 2.2; 2.2; 2.2; 2.2; 2.2; 2.2; 2.2; 2.2; 2.2; 2.2; 2.2; 4.4; 2.2; 2.2; 2.2 INJECTION, SUSPENSION INTRAMUSCULAR at 15:20

## 2025-01-14 RX ADMIN — KETOROLAC TROMETHAMINE 15 MILLIGRAM(S): 30 INJECTION INTRAMUSCULAR; INTRAVENOUS at 16:07

## 2025-01-14 RX ADMIN — ACETAMINOPHEN 975 MILLIGRAM(S): 80 SOLUTION/ DROPS ORAL at 06:39

## 2025-01-14 RX ADMIN — ACETAMINOPHEN 975 MILLIGRAM(S): 80 SOLUTION/ DROPS ORAL at 05:45

## 2025-01-14 RX ADMIN — CHLORHEXIDINE GLUCONATE 1 APPLICATION(S): 1.2 RINSE ORAL at 09:00

## 2025-01-14 RX ADMIN — ERGOCALCIFEROL 50000 UNIT(S): 1.25 CAPSULE ORAL at 12:09

## 2025-01-14 RX ADMIN — NEISSERIA MENINGITIDIS SEROGROUP B NHBA FUSION PROTEIN ANTIGEN, NEISSERIA MENINGITIDIS SEROGROUP B FHBP FUSION PROTEIN ANTIGEN AND NEISSERIA MENINGITIDIS SEROGROUP B NADA PROTEIN ANTIGEN 0.5 MILLILITER(S): 50; 50; 50; 25 INJECTION, SUSPENSION INTRAMUSCULAR at 15:21

## 2025-01-14 RX ADMIN — KETOROLAC TROMETHAMINE 15 MILLIGRAM(S): 30 INJECTION INTRAMUSCULAR; INTRAVENOUS at 05:45

## 2025-01-14 RX ADMIN — ACETAMINOPHEN 975 MILLIGRAM(S): 80 SOLUTION/ DROPS ORAL at 12:09

## 2025-01-14 RX ADMIN — Medication 0.5 MILLILITER(S): at 15:24

## 2025-01-14 RX ADMIN — NEISSERIA MENINGITIDIS GROUP A CAPSULAR POLYSACCHARIDE TETANUS TOXOID CONJUGATE ANTIGEN, NEISSERIA MENINGITIDIS GROUP C CAPSULAR POLYSACCHARIDE TETANUS TOXOID CONJUGATE ANTIGEN, NEISSERIA MENINGITIDIS GROUP Y CAPSULAR POLYSACCHARIDE TETANUS TOXOID CONJUGATE ANTIGEN, AND NEISSERIA MENINGITIDIS GROUP W-135 CAPSULAR POLYSACCHARIDE TETANUS TOXOID CONJUGATE ANTIGEN 0.5 MILLILITER(S): 10; 10; 10; 10 INJECTION, SOLUTION INTRAMUSCULAR at 15:26

## 2025-01-14 RX ADMIN — KETOROLAC TROMETHAMINE 15 MILLIGRAM(S): 30 INJECTION INTRAMUSCULAR; INTRAVENOUS at 15:37

## 2025-01-14 RX ADMIN — KETOROLAC TROMETHAMINE 15 MILLIGRAM(S): 30 INJECTION INTRAMUSCULAR; INTRAVENOUS at 09:30

## 2025-01-14 RX ADMIN — ENOXAPARIN SODIUM 40 MILLIGRAM(S): 60 INJECTION INTRAVENOUS; SUBCUTANEOUS at 12:09

## 2025-01-14 RX ADMIN — ACETAMINOPHEN 975 MILLIGRAM(S): 80 SOLUTION/ DROPS ORAL at 00:20

## 2025-01-14 RX ADMIN — ACETAMINOPHEN 975 MILLIGRAM(S): 80 SOLUTION/ DROPS ORAL at 12:39

## 2025-01-14 RX ADMIN — LIDOCAINE 1 PATCH: 50 OINTMENT TOPICAL at 11:53

## 2025-01-14 RX ADMIN — KETOROLAC TROMETHAMINE 15 MILLIGRAM(S): 30 INJECTION INTRAMUSCULAR; INTRAVENOUS at 09:00

## 2025-01-14 RX ADMIN — KETOROLAC TROMETHAMINE 15 MILLIGRAM(S): 30 INJECTION INTRAMUSCULAR; INTRAVENOUS at 06:05

## 2025-01-14 RX ADMIN — LIDOCAINE 1 PATCH: 50 OINTMENT TOPICAL at 07:12

## 2025-01-14 NOTE — PROGRESS NOTE ADULT - PROBLEM SELECTOR PROBLEM 1
Hypoglycemia
Type 2 diabetes mellitus with hyperglycemia
Hypoglycemia
Type 2 diabetes mellitus with hyperglycemia
Hypoglycemia
Hypoglycemia

## 2025-01-14 NOTE — DISCHARGE NOTE NURSING/CASE MANAGEMENT/SOCIAL WORK - FINANCIAL ASSISTANCE
[facility] provides services at a reduced cost to those who are determined to be eligible through [facility]’s financial assistance program. Information regarding [facility]’s financial assistance program can be found by going to [email or web address] or by calling [phone].

## 2025-01-14 NOTE — DISCHARGE NOTE NURSING/CASE MANAGEMENT/SOCIAL WORK - NSDCVIVACCINE_GEN_ALL_CORE_FT
Hib (PRP-T); 14-Jan-2025 15:24; Ivon Horowitz (GAMAL); [employer]; [id number] (Exp. Date: 24-Dec-2025); IntraMuscular; Deltoid Left.; 0.5 milliLiter(s); VIS (VIS Published: 06-Aug-2021, VIS Presented: 14-Jan-2025);   Meningococcal MCV4O; 14-Jan-2025 15:26; Ivon Horowitz (GAMAL); [employer]; [id number] (Exp. Date: 31-Dec-2025); IntraMuscular; Deltoid Left.; 0.5 milliLiter(s); VIS (VIS Published: 06-Aug-2021, VIS Presented: 14-Jan-2025);   Pneumococcal conjugate vaccine 20-valent (PCV20), polysaccharide [id number] conjugate, adjuvant, preserv; 14-Jan-2025 15:20; Ivon Horowitz (GAMAL); [employer]; [id number] (Exp. Date: 09-Apr-2026); IntraMuscular; Deltoid Right.; 0.5 milliLiter(s); VIS (VIS Published: 12-May-2023, VIS Presented: 14-Jan-2025);

## 2025-01-14 NOTE — PROGRESS NOTE ADULT - NUTRITIONAL ASSESSMENT
This patient has been assessed with a concern for Malnutrition and has been determined to have a diagnosis/diagnoses of Morbid obesity (BMI > 40).    This patient is being managed with:   Diet NPO after Midnight-     NPO Start Date: 06-Jan-2025   NPO Start Time: 23:59  Except Medications  Entered: Jan 7 2025  2:10AM    Diet DASH/TLC-  Sodium & Cholesterol Restricted  Entered: Jan 2 2025  9:26PM  
This patient has been assessed with a concern for Malnutrition and has been determined to have a diagnosis/diagnoses of Morbid obesity (BMI > 40).    This patient is being managed with:   Diet DASH/TLC-  Sodium & Cholesterol Restricted  Entered: Jan 2 2025  9:26PM  
This patient has been assessed with a concern for Malnutrition and has been determined to have a diagnosis/diagnoses of Morbid obesity (BMI > 40).    This patient is being managed with:   Diet Clear Liquid-  Entered: Jan 11 2025  9:45AM  
This patient has been assessed with a concern for Malnutrition and has been determined to have a diagnosis/diagnoses of Morbid obesity (BMI > 40).    This patient is being managed with:   Diet NPO after Midnight-     NPO Start Date: 06-Jan-2025   NPO Start Time: 23:59  Except Medications  Entered: Jan 7 2025  2:10AM    Diet DASH/TLC-  Sodium & Cholesterol Restricted  Entered: Jan 2 2025  9:26PM  
This patient has been assessed with a concern for Malnutrition and has been determined to have a diagnosis/diagnoses of Morbid obesity (BMI > 40).    This patient is being managed with:   Diet DASH/TLC-  Sodium & Cholesterol Restricted  Entered: Jan 2 2025  9:26PM  
This patient has been assessed with a concern for Malnutrition and has been determined to have a diagnosis/diagnoses of Morbid obesity (BMI > 40).    This patient is being managed with:   Diet DASH/TLC-  Sodium & Cholesterol Restricted  Entered: Jan 2 2025  9:26PM  
This patient has been assessed with a concern for Malnutrition and has been determined to have a diagnosis/diagnoses of Morbid obesity (BMI > 40).    This patient is being managed with:   Diet Full Liquid-  Consistent Carbohydrate {Evening Snack} (CSTCHOSN)  Entered: Jan 13 2025  9:27AM  
This patient has been assessed with a concern for Malnutrition and has been determined to have a diagnosis/diagnoses of Morbid obesity (BMI > 40).    This patient is being managed with:   Diet Clear Liquid-  Entered: Jan 11 2025  9:45AM

## 2025-01-14 NOTE — DISCHARGE NOTE NURSING/CASE MANAGEMENT/SOCIAL WORK - NSDCPEFALRISK_GEN_ALL_CORE
For information on Fall & Injury Prevention, visit: [email or web address] OR  [email or web address] OR  https://www.cdc.gov/steadi/patient.html

## 2025-01-14 NOTE — PROGRESS NOTE ADULT - PROBLEM SELECTOR PROBLEM 2
HTN (hypertension)
Insulinoma
HTN (hypertension)
Insulinoma

## 2025-01-14 NOTE — PROGRESS NOTE ADULT - ASSESSMENT
44 M PMH HTN who initially presented after experiencing generalized weakness and confusion who was admitted to MICU for significant and persistent hypoglycemia. CT showing 4x3cm lesion in tail of pancreas and 2x2cm lesion in body. Patient with persistent hypoglycemia despite D10 administration. Patient also with elevated c-peptide of 5.6. 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 Now S/P Distal Pancreatectomy with splenectomy with Dr. Woods on 1/10/2025.  Recovering in SICU and hemodynamically stable.     Plan:   - Pain control   - FLD  - f/u UE swelling, s/p Lasix x1  - OOB   - f/u endocrine recs: currently on 10 lantus @ bedtime   - lovenox restarted   - IVL   - f/u drain amylase am - 361 from 558  - Encourage OOB   - PT re-eval    *final plan pending discussion with day team     [facility] Surgery, [id number]    44 M PMH HTN who initially presented after experiencing generalized weakness and confusion who was admitted to MICU for significant and persistent hypoglycemia. CT showing 4x3cm lesion in tail of pancreas and 2x2cm lesion in body. Patient with persistent hypoglycemia despite D10 administration. Patient also with elevated c-peptide of 5.6. 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 Now S/P Distal Pancreatectomy with splenectomy with Dr. Woods on 1/10/2025.  Recovering now on the floor. Passing gas, not yet having bowel movements.     Plan:   - Pain control   - advance to flow fiber diet today   - f/u UE swelling, s/p Lasix x1  - OOB   - f/u endocrine recs: currently on 10 lantus @ bedtime   - lovenox restarted   - IVL   - f/u drain amylase am - 361 from 558  - PT re-eval    - will need post-splenectomy vaccines prior to discharge     [facility], [id number]

## 2025-01-14 NOTE — PROGRESS NOTE ADULT - PROBLEM SELECTOR PROBLEM 4
Insulinoma
HTN (hypertension)
Insulinoma
HTN (hypertension)
Insulinoma
Insulinoma

## 2025-01-14 NOTE — PROGRESS NOTE ADULT - SUBJECTIVE AND OBJECTIVE BOX
[facility] Team Surgery Daily Progress Note     Interval Events:  - s/p lasix for UE swelling   - advanced to FLD      SUBJECTIVE: Pt seen at bedside this morning. +/-    Physical Exam:  General Appearance: Appears well, NAD  Neck: Supple  Chest: Equal expansion bilaterally, equal breath sounds  CV: Pulse regular presently  Abdomen: Soft, nondistended, appropriate incisional tenderness, dressings clean and dry and intact  Extremities: Grossly symmetric, SCD's in place       Vital Signs Last 24 Hrs  T(C): 36.8 (14 Jan 2025 01:05), Max: 37.3 (13 Jan 2025 04:42)  T(F): 98.3 (14 Jan 2025 01:05), Max: 99.2 (13 Jan 2025 04:42)  HR: 86 (14 Jan 2025 01:05) (86 - 100)  BP: 147/80 (14 Jan 2025 01:05) (132/84 - 159/90)  BP(mean): --  RR: 18 (14 Jan 2025 01:05) (17 - 18)  SpO2: 92% (14 Jan 2025 01:05) (92% - 93%)    Parameters below as of 14 Jan 2025 01:05  Patient On (Oxygen Delivery Method): room air        I&O's Summary    12 Jan 2025 07:01  -  13 Jan 2025 07:00  --------------------------------------------------------  IN: 450 mL / OUT: 1870 mL / NET: -1420 mL    13 Jan 2025 07:01  -  14 Jan 2025 01:55  --------------------------------------------------------  IN: 1080 mL / OUT: 1740 mL / NET: -660 mL          LABS:                        10.6   9.92  )-----------( 400      ( 13 Jan 2025 07:59 )             34.7     01-13    137  |  101  |  9   ----------------------------<  146[H]  3.8   |  25  |  0.70    Ca    8.6      13 Jan 2025 07:59  Phos  2.9     01-13  Mg     2.1     01-13    TPro  6.6  /  Alb  3.1[L]  /  TBili  0.3  /  DBili  x   /  AST  28  /  ALT  31  /  AlkPhos  89  01-13      Urinalysis Basic - ( 13 Jan 2025 07:59 )    Color: x / Appearance: x / SG: x / pH: x  Gluc: 146 mg/dL / Ketone: x  / Bili: x / Urobili: x   Blood: x / Protein: x / Nitrite: x   Leuk Esterase: x / RBC: x / WBC x   Sq Epi: x / Non Sq Epi: x / Bacteria: x        RADIOLOGY & ADDITIONAL STUDIES: [facility] Team Surgery Daily Progress Note     Interval Events:  - s/p lasix for UE swelling   - advanced to FLD      SUBJECTIVE: Pt seen at bedside this morning. +/-    Physical Exam:  General Appearance: Appears well, NAD  Neck: Supple  Chest: Equal expansion bilaterally, equal breath sounds  CV: Pulse regular presently  Abdomen: Soft, nondistended, appropriate incisional tenderness, dressings clean and dry and intact. AARON in place with ss output   Extremities: Grossly symmetric, SCD's in place       Vital Signs Last 24 Hrs  T(C): 36.8 (14 Jan 2025 01:05), Max: 37.3 (13 Jan 2025 04:42)  T(F): 98.3 (14 Jan 2025 01:05), Max: 99.2 (13 Jan 2025 04:42)  HR: 86 (14 Jan 2025 01:05) (86 - 100)  BP: 147/80 (14 Jan 2025 01:05) (132/84 - 159/90)  BP(mean): --  RR: 18 (14 Jan 2025 01:05) (17 - 18)  SpO2: 92% (14 Jan 2025 01:05) (92% - 93%)    Parameters below as of 14 Jan 2025 01:05  Patient On (Oxygen Delivery Method): room air        I&O's Summary    12 Jan 2025 07:01  -  13 Jan 2025 07:00  --------------------------------------------------------  IN: 450 mL / OUT: 1870 mL / NET: -1420 mL    13 Jan 2025 07:01  -  14 Jan 2025 01:55  --------------------------------------------------------  IN: 1080 mL / OUT: 1740 mL / NET: -660 mL          LABS:                        10.6   9.92  )-----------( 400      ( 13 Jan 2025 07:59 )             34.7     01-13    137  |  101  |  9   ----------------------------<  146[H]  3.8   |  25  |  0.70    Ca    8.6      13 Jan 2025 07:59  Phos  2.9     01-13  Mg     2.1     01-13    TPro  6.6  /  Alb  3.1[L]  /  TBili  0.3  /  DBili  x   /  AST  28  /  ALT  31  /  AlkPhos  89  01-13      Urinalysis Basic - ( 13 Jan 2025 07:59 )    Color: x / Appearance: x / SG: x / pH: x  Gluc: 146 mg/dL / Ketone: x  / Bili: x / Urobili: x   Blood: x / Protein: x / Nitrite: x   Leuk Esterase: x / RBC: x / WBC x   Sq Epi: x / Non Sq Epi: x / Bacteria: x        RADIOLOGY & ADDITIONAL STUDIES:

## 2025-01-14 NOTE — PROGRESS NOTE ADULT - SUBJECTIVE AND OBJECTIVE BOX
Admitted for: Hypoglycemia        Following for:    Subjective:       MEDICATIONS  (STANDING):  acetaminophen     Tablet .. 975 milliGRAM(s) Oral every 6 hours  chlorhexidine 2% Cloths 1 Application(s) Topical <User Schedule>  enoxaparin Injectable 40 milliGRAM(s) SubCutaneous every 24 hours  ergocalciferol 64951 Unit(s) Oral every week  insulin glargine Injectable (LANTUS) 10 Unit(s) SubCutaneous at bedtime  insulin lispro (ADMELOG) corrective regimen sliding scale   SubCutaneous at bedtime  insulin lispro (ADMELOG) corrective regimen sliding scale   SubCutaneous three times a day before meals  ketorolac   Injectable 15 milliGRAM(s) IV Push every 6 hours  lidocaine   4% Patch 1 Patch Transdermal every 24 hours    MEDICATIONS  (PRN):  oxyCODONE    IR 5 milliGRAM(s) Oral every 4 hours PRN Moderate Pain (4 - 6)  oxyCODONE    IR 10 milliGRAM(s) Oral every 4 hours PRN Severe Pain (7 - 10)      Allergies    No Known Allergies    Intolerances          PHYSICAL EXAM:  VITALS: T(C): 36.7 (01-14-25 @ 13:20)  T(F): 98 (01-14-25 @ 13:20), Max: 99.1 (01-13-25 @ 21:14)  HR: 87 (01-14-25 @ 13:20) (83 - 96)  BP: 133/77 (01-14-25 @ 13:20) (133/77 - 159/90)  RR:  (18 - 18)  SpO2:  (92% - 94%)  Wt(kg): --  GENERAL: NAD  EYES: No proptosis, no lid lag, anicteric  RESPIRATORY: Clear to auscultation bilaterally  CARDIOVASCULAR: Regular rate and rhythm  GI: Soft, nontender, non distended  EXT: b/l feet without wounds, 2+ pulses  PSYCH: Alert and oriented x 3, reactive affect      A1C with Estimated Average Glucose Result: 4.9 % (01-03-25 @ 06:20)      POCT Blood Glucose.: 143 mg/dL (01-14-25 @ 12:06)  POCT Blood Glucose.: 127 mg/dL (01-14-25 @ 07:37)  POCT Blood Glucose.: 158 mg/dL (01-13-25 @ 21:23)  POCT Blood Glucose.: 146 mg/dL (01-13-25 @ 17:44)  POCT Blood Glucose.: 168 mg/dL (01-13-25 @ 12:18)  POCT Blood Glucose.: 141 mg/dL (01-13-25 @ 07:55)  POCT Blood Glucose.: 177 mg/dL (01-12-25 @ 21:13)  POCT Blood Glucose.: 190 mg/dL (01-12-25 @ 17:09)  POCT Blood Glucose.: 172 mg/dL (01-12-25 @ 12:32)  POCT Blood Glucose.: 172 mg/dL (01-12-25 @ 07:59)  POCT Blood Glucose.: 224 mg/dL (01-11-25 @ 21:58)  POCT Blood Glucose.: 217 mg/dL (01-11-25 @ 16:27)      01-14    136  |  101  |  11  ----------------------------<  112[H]  4.3   |  18[L]  |  0.70    eGFR: 117    Ca    8.7      01-14  Mg     2.2     01-14  Phos  3.9     01-14    TPro  6.5  /  Alb  3.0[L]  /  TBili  0.6  /  DBili  <0.1  /  AST  38  /  ALT  39  /  AlkPhos  103  01-14      Thyroid Function Tests:  01-03 @ 04:55 TSH 3.50 FreeT4 1.3 T3 -- Anti TPO -- Anti Thyroglobulin Ab -- TSI --  01-02 @ 15:54 TSH 1.60 FreeT4 -- T3 -- Anti TPO -- Anti Thyroglobulin Ab -- TSI --                         Admitted for: Hypoglycemia        Following for: insulinoma s/p resection, T2DM     Subjective:   Patient feels well, tolerating regular diet      MEDICATIONS  (STANDING):  acetaminophen     Tablet .. 975 milliGRAM(s) Oral every 6 hours  chlorhexidine 2% Cloths 1 Application(s) Topical <User Schedule>  enoxaparin Injectable 40 milliGRAM(s) SubCutaneous every 24 hours  ergocalciferol 44853 Unit(s) Oral every week  insulin glargine Injectable (LANTUS) 10 Unit(s) SubCutaneous at bedtime  insulin lispro (ADMELOG) corrective regimen sliding scale   SubCutaneous at bedtime  insulin lispro (ADMELOG) corrective regimen sliding scale   SubCutaneous three times a day before meals  ketorolac   Injectable 15 milliGRAM(s) IV Push every 6 hours  lidocaine   4% Patch 1 Patch Transdermal every 24 hours    MEDICATIONS  (PRN):  oxyCODONE    IR 5 milliGRAM(s) Oral every 4 hours PRN Moderate Pain (4 - 6)  oxyCODONE    IR 10 milliGRAM(s) Oral every 4 hours PRN Severe Pain (7 - 10)      Allergies    No Known Allergies    Intolerances          PHYSICAL EXAM:  VITALS: T(C): 36.7 (01-14-25 @ 13:20)  T(F): 98 (01-14-25 @ 13:20), Max: 99.1 (01-13-25 @ 21:14)  HR: 87 (01-14-25 @ 13:20) (83 - 96)  BP: 133/77 (01-14-25 @ 13:20) (133/77 - 159/90)  RR:  (18 - 18)  SpO2:  (92% - 94%)  Wt(kg): --  GENERAL: NAD, obese  EYES: No proptosis, no lid lag, anicteric  RESPIRATORY: no respiratory distress  CARDIOVASCULAR: no edema  GI: nondistended  EXT: SAUCEDO  PSYCH: Alert and oriented x 3, reactive affect      A1C with Estimated Average Glucose Result: 4.9 % (01-03-25 @ 06:20)      POCT Blood Glucose.: 143 mg/dL (01-14-25 @ 12:06)  POCT Blood Glucose.: 127 mg/dL (01-14-25 @ 07:37)  POCT Blood Glucose.: 158 mg/dL (01-13-25 @ 21:23)  POCT Blood Glucose.: 146 mg/dL (01-13-25 @ 17:44)  POCT Blood Glucose.: 168 mg/dL (01-13-25 @ 12:18)  POCT Blood Glucose.: 141 mg/dL (01-13-25 @ 07:55)  POCT Blood Glucose.: 177 mg/dL (01-12-25 @ 21:13)  POCT Blood Glucose.: 190 mg/dL (01-12-25 @ 17:09)  POCT Blood Glucose.: 172 mg/dL (01-12-25 @ 12:32)  POCT Blood Glucose.: 172 mg/dL (01-12-25 @ 07:59)  POCT Blood Glucose.: 224 mg/dL (01-11-25 @ 21:58)  POCT Blood Glucose.: 217 mg/dL (01-11-25 @ 16:27)      01-14    136  |  101  |  11  ----------------------------<  112[H]  4.3   |  18[L]  |  0.70    eGFR: 117    Ca    8.7      01-14  Mg     2.2     01-14  Phos  3.9     01-14    TPro  6.5  /  Alb  3.0[L]  /  TBili  0.6  /  DBili  <0.1  /  AST  38  /  ALT  39  /  AlkPhos  103  01-14      Thyroid Function Tests:  01-03 @ 04:55 TSH 3.50 FreeT4 1.3 T3 -- Anti TPO -- Anti Thyroglobulin Ab -- TSI --  01-02 @ 15:54 TSH 1.60 FreeT4 -- T3 -- Anti TPO -- Anti Thyroglobulin Ab -- TSI --

## 2025-01-14 NOTE — PROGRESS NOTE ADULT - PROBLEM SELECTOR PROBLEM 5
Right thyroid nodule

## 2025-01-14 NOTE — PROGRESS NOTE ADULT - PROBLEM SELECTOR PROBLEM 3
Pancreatic mass

## 2025-01-14 NOTE — DISCHARGE NOTE NURSING/CASE MANAGEMENT/SOCIAL WORK - PATIENT PORTAL LINK FT
You can access the FollowMyHealth Patient Portal offered by [facility] by registering at the following website: [email or web address]. By joining [employer]’s FollowMyHealth portal, you will also be able to view your health information using other applications (apps) compatible with our system.

## 2025-01-14 NOTE — PROGRESS NOTE ADULT - PROVIDER SPECIALTY LIST ADULT
Endocrinology
Endocrinology
MICU
Surgery
Gastroenterology
MICU
MICU
SICU
Surgery
Endocrinology
Endocrinology
MICU
SICU
Surgery
Endocrinology
Endocrinology
MICU
Surgery
Surgery
Endocrinology
MICU
Endocrinology

## 2025-01-14 NOTE — PROGRESS NOTE ADULT - ASSESSMENT
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 No family hx of DM. Obese with BMI of 42.3.  C-peptide post-surgery was 1.7 with glucose 186; there is sufficient insulin production. Low suspicion for type 3c DM or type 1 DM.  Recommendations:  - Nutrition consult. Discussed with patient that he no longer needs to drink juice all the time.  - Continue Lantus 10 units qhs  - Continue low dose admelog scale TIDAC, low dose scale QHS  - CC diet, advance as tolerated  - Discharge planning: metformin 500mg BID x 7 days, followed by 1000mg BID. Counseled on diet and exercise. Although may benefit from GLP1RA given BMI 42.3, will avoid GLP1RA for now given recent pancreatic resection.    #Hypoglycemia secondary to insulinoma, s/p resection  - CT abd/pel with PANCREAS: Lobulated enhancing mass at the pancreatic tail measuring 4.3 x 3.7 cm, with abutment of the left kidney at the posterior margin. Additional hypoattenuating lesion in the pancreatic body measuring 2.1 x 1.8 cm.  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 There is also a 2.1 x 2.4 cm septated cystic lesion within the pancreatic body without appreciable solid component, which is nonspecific and could represent a mucinous cystadenoma, side branch intraductal papillary mucinous neoplasm (IPMN), or pseudocyst.  - Clinical picture highly suspicious for insulinoma given labs and imaging  - AM cortisol 12.6 1/4/25, rules out adrenal insufficiency  - Sulfonylurea panel negative  - Neuroendocrine tumor markers: Chromogranin 24.1, Gastrin 19 wnl  - s/p Pancreatectomy, distal, robot-assisted, with splenectomy  - Hypoglycemia resolved  Recommendations:  - Outpatient genetic testing     #Elevated PTH  PTHi elevated to 199 with corrected calcium of 8.6, low phos 2.2  Vitamin D 25OH low at 12.7  Likely secondary hyperparathyroidism and less likely primary hyperparathyroid/parathyroid hyperplasia   - Start vitamin D repletion: 50,000 IU weekly x 4 weeks, followed by 2000 IU daily   - Repeat vitamin D 25OH, PTH, CMP in around 6 weeks outpatient    #Thyroid nodule  Incidental finding on CT chest  US performed 1/7/25: Right thyroid nodule: --Nodule 1: - In the medial lower pole/isthmus, measuring 2.0 x 1.0 x 2.1   cm predominantly solid, hypoechoic, TIRADS 4. Moderate vascularity is demonstrated within this nodule with color Doppler.  PLAN:  - FNA indicated, can be done outpatient at office visit or inpatient to expedite care.     #HTN  Management per primary team, currently on amlodipine 7.5 mg qd    Patient will need endocrinology follow up after discharge. Will arrange for follow up at [address] [place] [address] endocrinology ([phone]).        Endy Serrato MD  Endocrine Fellow  Can be reached via teams. For follow up questions, discharge recommendations, or new consults, please call answering service at [phone] (weekdays); [phone] (nights/weekends).

## 2025-01-15 VITALS
OXYGEN SATURATION: 95 % | SYSTOLIC BLOOD PRESSURE: 131 MMHG | TEMPERATURE: 98 F | DIASTOLIC BLOOD PRESSURE: 76 MMHG | HEART RATE: 89 BPM | RESPIRATION RATE: 18 BRPM

## 2025-01-15 PROBLEM — Z00.00 ENCOUNTER FOR PREVENTIVE HEALTH EXAMINATION: Status: ACTIVE | Noted: 2025-01-15

## 2025-01-21 ENCOUNTER — APPOINTMENT (OUTPATIENT)
Dept: SURGICAL ONCOLOGY | Facility: CLINIC | Age: 45
End: 2025-01-21
Payer: COMMERCIAL

## 2025-01-21 ENCOUNTER — NON-APPOINTMENT (OUTPATIENT)
Age: 45
End: 2025-01-21

## 2025-01-21 ENCOUNTER — RESULT REVIEW (OUTPATIENT)
Age: 45
End: 2025-01-21

## 2025-01-21 VITALS
OXYGEN SATURATION: 99 % | WEIGHT: 279 LBS | HEIGHT: 70 IN | BODY MASS INDEX: 39.94 KG/M2 | RESPIRATION RATE: 17 BRPM | SYSTOLIC BLOOD PRESSURE: 155 MMHG | HEART RATE: 80 BPM | TEMPERATURE: 98.3 F | DIASTOLIC BLOOD PRESSURE: 93 MMHG

## 2025-01-21 VITALS
TEMPERATURE: 98.3 F | RESPIRATION RATE: 17 BRPM | OXYGEN SATURATION: 99 % | SYSTOLIC BLOOD PRESSURE: 155 MMHG | HEIGHT: 70 IN | BODY MASS INDEX: 39.94 KG/M2 | WEIGHT: 279 LBS | DIASTOLIC BLOOD PRESSURE: 93 MMHG | HEART RATE: 80 BPM

## 2025-01-21 PROCEDURE — 99024 POSTOP FOLLOW-UP VISIT: CPT

## 2025-01-23 ENCOUNTER — TRANSCRIPTION ENCOUNTER (OUTPATIENT)
Age: 45
End: 2025-01-23

## 2025-01-23 ENCOUNTER — APPOINTMENT (OUTPATIENT)
Dept: CT IMAGING | Facility: IMAGING CENTER | Age: 45
End: 2025-01-23
Payer: COMMERCIAL

## 2025-01-23 ENCOUNTER — OUTPATIENT (OUTPATIENT)
Dept: OUTPATIENT SERVICES | Facility: HOSPITAL | Age: 45
LOS: 1 days | End: 2025-01-23
Payer: COMMERCIAL

## 2025-01-23 DIAGNOSIS — D13.7 BENIGN NEOPLASM OF ENDOCRINE PANCREAS: ICD-10-CM

## 2025-01-23 DIAGNOSIS — Z00.8 ENCOUNTER FOR OTHER GENERAL EXAMINATION: ICD-10-CM

## 2025-01-23 PROCEDURE — 74177 CT ABD & PELVIS W/CONTRAST: CPT | Mod: 26

## 2025-01-23 PROCEDURE — 74177 CT ABD & PELVIS W/CONTRAST: CPT

## 2025-01-24 RX ORDER — AMOXICILLIN AND CLAVULANATE POTASSIUM 875; 125 MG/1; MG/1
875-125 TABLET, COATED ORAL
Qty: 14 | Refills: 0 | Status: ACTIVE | COMMUNITY
Start: 2025-01-24 | End: 1900-01-01

## 2025-01-28 ENCOUNTER — APPOINTMENT (OUTPATIENT)
Dept: SURGICAL ONCOLOGY | Facility: CLINIC | Age: 45
End: 2025-01-28
Payer: COMMERCIAL

## 2025-01-28 ENCOUNTER — TRANSCRIPTION ENCOUNTER (OUTPATIENT)
Age: 45
End: 2025-01-28

## 2025-01-28 VITALS
WEIGHT: 276 LBS | OXYGEN SATURATION: 97 % | DIASTOLIC BLOOD PRESSURE: 90 MMHG | SYSTOLIC BLOOD PRESSURE: 158 MMHG | HEIGHT: 70 IN | BODY MASS INDEX: 39.51 KG/M2 | RESPIRATION RATE: 17 BRPM | HEART RATE: 98 BPM

## 2025-01-28 PROBLEM — D13.7 INSULINOMA: Status: ACTIVE | Noted: 2025-01-21

## 2025-01-28 PROCEDURE — 99024 POSTOP FOLLOW-UP VISIT: CPT

## 2025-01-30 ENCOUNTER — NON-APPOINTMENT (OUTPATIENT)
Age: 45
End: 2025-01-30

## 2025-01-31 LAB — SURGICAL PATHOLOGY STUDY: SIGNIFICANT CHANGE UP

## 2025-02-04 ENCOUNTER — APPOINTMENT (OUTPATIENT)
Dept: SURGICAL ONCOLOGY | Facility: CLINIC | Age: 45
End: 2025-02-04
Payer: COMMERCIAL

## 2025-02-04 ENCOUNTER — APPOINTMENT (OUTPATIENT)
Dept: ENDOCRINOLOGY | Facility: CLINIC | Age: 45
End: 2025-02-04
Payer: COMMERCIAL

## 2025-02-04 VITALS
HEIGHT: 70 IN | OXYGEN SATURATION: 96 % | BODY MASS INDEX: 38.08 KG/M2 | SYSTOLIC BLOOD PRESSURE: 132 MMHG | WEIGHT: 266 LBS | HEART RATE: 91 BPM | DIASTOLIC BLOOD PRESSURE: 70 MMHG

## 2025-02-04 VITALS
SYSTOLIC BLOOD PRESSURE: 137 MMHG | HEART RATE: 96 BPM | OXYGEN SATURATION: 98 % | WEIGHT: 266 LBS | BODY MASS INDEX: 38.08 KG/M2 | HEIGHT: 70 IN | DIASTOLIC BLOOD PRESSURE: 90 MMHG

## 2025-02-04 DIAGNOSIS — D13.7 BENIGN NEOPLASM OF ENDOCRINE PANCREAS: ICD-10-CM

## 2025-02-04 DIAGNOSIS — K86.2 CYST OF PANCREAS: ICD-10-CM

## 2025-02-04 DIAGNOSIS — R79.89 OTHER SPECIFIED ABNORMAL FINDINGS OF BLOOD CHEMISTRY: ICD-10-CM

## 2025-02-04 PROBLEM — E04.1 THYROID NODULE: Status: ACTIVE | Noted: 2025-02-04

## 2025-02-04 LAB
25(OH)D3 SERPL-MCNC: 22.6 NG/ML
ALBUMIN SERPL ELPH-MCNC: 4.4 G/DL
ALP BLD-CCNC: 104 U/L
ALT SERPL-CCNC: 27 U/L
ANION GAP SERPL CALC-SCNC: 13 MMOL/L
AST SERPL-CCNC: 17 U/L
BILIRUB SERPL-MCNC: 0.4 MG/DL
BUN SERPL-MCNC: 11 MG/DL
CALCIUM SERPL-MCNC: 9.6 MG/DL
CALCIUM SERPL-MCNC: 9.6 MG/DL
CHLORIDE SERPL-SCNC: 100 MMOL/L
CO2 SERPL-SCNC: 27 MMOL/L
CREAT SERPL-MCNC: 0.77 MG/DL
EGFR: 114 ML/MIN/1.73M2
GLUCOSE SERPL-MCNC: 98 MG/DL
PARATHYROID HORMONE INTACT: 33 PG/ML
POTASSIUM SERPL-SCNC: 4.5 MMOL/L
PROT SERPL-MCNC: 7.9 G/DL
SODIUM SERPL-SCNC: 140 MMOL/L

## 2025-02-04 PROCEDURE — 99024 POSTOP FOLLOW-UP VISIT: CPT

## 2025-02-04 PROCEDURE — G2211 COMPLEX E/M VISIT ADD ON: CPT | Mod: NC

## 2025-02-04 PROCEDURE — 99215 OFFICE O/P EST HI 40 MIN: CPT

## 2025-02-14 ENCOUNTER — LABORATORY RESULT (OUTPATIENT)
Age: 45
End: 2025-02-14

## 2025-02-14 ENCOUNTER — APPOINTMENT (OUTPATIENT)
Dept: INTERNAL MEDICINE | Facility: CLINIC | Age: 45
End: 2025-02-14
Payer: COMMERCIAL

## 2025-02-14 VITALS
HEIGHT: 70 IN | HEART RATE: 78 BPM | DIASTOLIC BLOOD PRESSURE: 79 MMHG | OXYGEN SATURATION: 98 % | WEIGHT: 266 LBS | SYSTOLIC BLOOD PRESSURE: 136 MMHG | BODY MASS INDEX: 38.08 KG/M2

## 2025-02-14 DIAGNOSIS — E04.1 NONTOXIC SINGLE THYROID NODULE: ICD-10-CM

## 2025-02-14 DIAGNOSIS — Z00.00 ENCOUNTER FOR GENERAL ADULT MEDICAL EXAMINATION W/OUT ABNORMAL FINDINGS: ICD-10-CM

## 2025-02-14 DIAGNOSIS — Z01.84 ENCOUNTER FOR ANTIBODY RESPONSE EXAMINATION: ICD-10-CM

## 2025-02-14 DIAGNOSIS — I10 ESSENTIAL (PRIMARY) HYPERTENSION: ICD-10-CM

## 2025-02-14 DIAGNOSIS — E55.9 VITAMIN D DEFICIENCY, UNSPECIFIED: ICD-10-CM

## 2025-02-14 DIAGNOSIS — Q89.01 ASPLENIA (CONGENITAL): ICD-10-CM

## 2025-02-14 DIAGNOSIS — D13.7 BENIGN NEOPLASM OF ENDOCRINE PANCREAS: ICD-10-CM

## 2025-02-14 PROCEDURE — 99213 OFFICE O/P EST LOW 20 MIN: CPT | Mod: 25

## 2025-02-14 PROCEDURE — 99396 PREV VISIT EST AGE 40-64: CPT

## 2025-02-14 RX ORDER — MENINGOCOCCAL (GROUPS A, C, Y AND W-135) OLIGOSACCHARIDE DIPHTHERIA CRM197 CONJUGATE VACCINE 10; 5; 5; 5 UG/.5ML; UG/.5ML; UG/.5ML; UG/.5ML
INJECTION, SOLUTION INTRAMUSCULAR
Qty: 1 | Refills: 0 | Status: ACTIVE | COMMUNITY
Start: 2025-02-14 | End: 1900-01-01

## 2025-02-14 RX ORDER — NEISSERIA MENINGITIDIS SEROGROUP B NHBA FUSION PROTEIN ANTIGEN, NEISSERIA MENINGITIDIS SEROGROUP B FHBP FUSION PROTEIN ANTIGEN AND NEISSERIA MENINGITIDIS SEROGROUP B NADA PROTEIN ANTIGEN 50; 50; 50; 25 UG/.5ML; UG/.5ML; UG/.5ML; UG/.5ML
INJECTION, SUSPENSION INTRAMUSCULAR
Qty: 1 | Refills: 0 | Status: ACTIVE | COMMUNITY
Start: 2025-02-14 | End: 1900-01-01

## 2025-02-19 ENCOUNTER — TRANSCRIPTION ENCOUNTER (OUTPATIENT)
Age: 45
End: 2025-02-19

## 2025-02-19 LAB
25(OH)D3 SERPL-MCNC: 25.8 NG/ML
ALBUMIN SERPL ELPH-MCNC: 4.2 G/DL
ALP BLD-CCNC: 83 U/L
ALT SERPL-CCNC: 18 U/L
ANION GAP SERPL CALC-SCNC: 13 MMOL/L
AST SERPL-CCNC: 18 U/L
BASOPHILS # BLD AUTO: 0.12 K/UL
BASOPHILS NFR BLD AUTO: 1.8 %
BILIRUB SERPL-MCNC: 0.5 MG/DL
BUN SERPL-MCNC: 10 MG/DL
CALCIUM SERPL-MCNC: 9.3 MG/DL
CHLORIDE SERPL-SCNC: 102 MMOL/L
CHOLEST SERPL-MCNC: 166 MG/DL
CO2 SERPL-SCNC: 25 MMOL/L
CREAT SERPL-MCNC: 0.59 MG/DL
EGFR: 123 ML/MIN/1.73M2
EOSINOPHIL # BLD AUTO: 0.48 K/UL
EOSINOPHIL NFR BLD AUTO: 7.3 %
ESTIMATED AVERAGE GLUCOSE: 120 MG/DL
GLUCOSE SERPL-MCNC: 88 MG/DL
HBA1C MFR BLD HPLC: 5.8 %
HBV SURFACE AB SER QL: NONREACTIVE
HBV SURFACE AG SER QL: NONREACTIVE
HCT VFR BLD CALC: 42.1 %
HDLC SERPL-MCNC: 24 MG/DL
HEPATITIS A IGG ANTIBODY: REACTIVE
HGB BLD-MCNC: 12.2 G/DL
LDLC SERPL CALC-MCNC: 123 MG/DL
LYMPHOCYTES # BLD AUTO: 1.68 K/UL
LYMPHOCYTES NFR BLD AUTO: 25.4 %
MAN DIFF?: NORMAL
MCHC RBC-ENTMCNC: 19.4 PG
MCHC RBC-ENTMCNC: 29 G/DL
MCV RBC AUTO: 67 FL
MEV IGG FLD QL IA: >300 AU/ML
MEV IGG+IGM SER-IMP: POSITIVE
MONOCYTES # BLD AUTO: 0.54 K/UL
MONOCYTES NFR BLD AUTO: 8.2 %
MUV AB SER-ACNC: POSITIVE
MUV IGG SER QL IA: 81.3 AU/ML
NEUTROPHILS # BLD AUTO: 3.8 K/UL
NEUTROPHILS NFR BLD AUTO: 57.3 %
NONHDLC SERPL-MCNC: 142 MG/DL
PLATELET # BLD AUTO: 560 K/UL
POTASSIUM SERPL-SCNC: 3.5 MMOL/L
PROT SERPL-MCNC: 8 G/DL
RBC # BLD: 6.28 M/UL
RBC # FLD: 19.2 %
RUBV IGG FLD-ACNC: 2.94 INDEX
RUBV IGG SER-IMP: POSITIVE
SODIUM SERPL-SCNC: 140 MMOL/L
T4 FREE SERPL-MCNC: 1.2 NG/DL
TRIGL SERPL-MCNC: 100 MG/DL
TSH SERPL-ACNC: 2.02 UIU/ML
VIT B12 SERPL-MCNC: 554 PG/ML
VZV AB TITR SER: POSITIVE
VZV IGG SER IF-ACNC: 10.9 S/CO
WBC # FLD AUTO: 6.63 K/UL

## 2025-02-26 ENCOUNTER — APPOINTMENT (OUTPATIENT)
Dept: ENDOCRINOLOGY | Facility: CLINIC | Age: 45
End: 2025-02-26
Payer: COMMERCIAL

## 2025-02-26 VITALS
DIASTOLIC BLOOD PRESSURE: 80 MMHG | HEIGHT: 61 IN | HEART RATE: 96 BPM | OXYGEN SATURATION: 98 % | WEIGHT: 266 LBS | SYSTOLIC BLOOD PRESSURE: 134 MMHG | BODY MASS INDEX: 50.22 KG/M2

## 2025-02-26 DIAGNOSIS — E04.1 NONTOXIC SINGLE THYROID NODULE: ICD-10-CM

## 2025-02-26 PROCEDURE — 10005 FNA BX W/US GDN 1ST LES: CPT

## 2025-02-27 LAB — FNA, THYROID: NORMAL

## 2025-03-17 ENCOUNTER — APPOINTMENT (OUTPATIENT)
Dept: INTERNAL MEDICINE | Facility: CLINIC | Age: 45
End: 2025-03-17

## 2025-03-17 ENCOUNTER — TRANSCRIPTION ENCOUNTER (OUTPATIENT)
Age: 45
End: 2025-03-17

## 2025-03-18 ENCOUNTER — APPOINTMENT (OUTPATIENT)
Dept: INTERNAL MEDICINE | Facility: CLINIC | Age: 45
End: 2025-03-18

## 2025-03-18 VITALS
DIASTOLIC BLOOD PRESSURE: 95 MMHG | OXYGEN SATURATION: 97 % | HEIGHT: 70 IN | WEIGHT: 255 LBS | SYSTOLIC BLOOD PRESSURE: 153 MMHG | HEART RATE: 83 BPM | BODY MASS INDEX: 36.51 KG/M2

## 2025-03-18 VITALS — SYSTOLIC BLOOD PRESSURE: 144 MMHG | DIASTOLIC BLOOD PRESSURE: 84 MMHG

## 2025-03-18 PROBLEM — R31.0 GROSS HEMATURIA: Status: ACTIVE | Noted: 2025-03-18

## 2025-03-18 LAB
BILIRUB UR QL STRIP: NEGATIVE
CLARITY UR: NORMAL
COLLECTION METHOD: NORMAL
GLUCOSE UR-MCNC: NEGATIVE
HCG UR QL: 0.2 EU/DL
HGB UR QL STRIP.AUTO: NORMAL
KETONES UR-MCNC: NEGATIVE
LEUKOCYTE ESTERASE UR QL STRIP: NEGATIVE
NITRITE UR QL STRIP: NEGATIVE
PH UR STRIP: 7.5
PROT UR STRIP-MCNC: 30
SP GR UR STRIP: 1.01

## 2025-03-18 PROCEDURE — 90471 IMMUNIZATION ADMIN: CPT

## 2025-03-18 PROCEDURE — 90734 MENACWYD/MENACWYCRM VACC IM: CPT

## 2025-03-18 PROCEDURE — 99213 OFFICE O/P EST LOW 20 MIN: CPT | Mod: 25

## 2025-03-18 PROCEDURE — 81003 URINALYSIS AUTO W/O SCOPE: CPT | Mod: QW

## 2025-03-18 PROCEDURE — G2211 COMPLEX E/M VISIT ADD ON: CPT | Mod: NC

## 2025-03-18 RX ORDER — NEISSERIA MENINGITIDIS SEROGROUP B NHBA FUSION PROTEIN ANTIGEN, NEISSERIA MENINGITIDIS SEROGROUP B FHBP FUSION PROTEIN ANTIGEN AND NEISSERIA MENINGITIDIS SEROGROUP B NADA PROTEIN ANTIGEN 50; 50; 50; 25 UG/.5ML; UG/.5ML; UG/.5ML; UG/.5ML
INJECTION, SUSPENSION INTRAMUSCULAR
Qty: 1 | Refills: 0 | Status: ACTIVE | COMMUNITY
Start: 2025-03-18 | End: 1900-01-01

## 2025-03-19 ENCOUNTER — TRANSCRIPTION ENCOUNTER (OUTPATIENT)
Age: 45
End: 2025-03-19

## 2025-03-19 DIAGNOSIS — Q89.01 ASPLENIA (CONGENITAL): ICD-10-CM

## 2025-03-19 LAB — BACTERIA UR CULT: NORMAL

## 2025-03-19 RX ORDER — NEISSERIA MENINGITIDIS SEROGROUP B NHBA FUSION PROTEIN ANTIGEN, NEISSERIA MENINGITIDIS SEROGROUP B FHBP FUSION PROTEIN ANTIGEN AND NEISSERIA MENINGITIDIS SEROGROUP B NADA PROTEIN ANTIGEN 50; 50; 50; 25 UG/.5ML; UG/.5ML; UG/.5ML; UG/.5ML
INJECTION, SUSPENSION INTRAMUSCULAR
Qty: 1 | Refills: 0 | Status: ACTIVE | COMMUNITY
Start: 2025-03-19 | End: 1900-01-01

## 2025-03-20 ENCOUNTER — TRANSCRIPTION ENCOUNTER (OUTPATIENT)
Age: 45
End: 2025-03-20

## 2025-03-24 ENCOUNTER — OUTPATIENT (OUTPATIENT)
Dept: OUTPATIENT SERVICES | Facility: HOSPITAL | Age: 45
LOS: 1 days | End: 2025-03-24
Payer: COMMERCIAL

## 2025-03-24 ENCOUNTER — APPOINTMENT (OUTPATIENT)
Dept: UROLOGY | Facility: CLINIC | Age: 45
End: 2025-03-24
Payer: COMMERCIAL

## 2025-03-24 VITALS
HEIGHT: 70 IN | WEIGHT: 250 LBS | HEART RATE: 89 BPM | DIASTOLIC BLOOD PRESSURE: 92 MMHG | SYSTOLIC BLOOD PRESSURE: 159 MMHG | BODY MASS INDEX: 35.79 KG/M2

## 2025-03-24 DIAGNOSIS — R31.0 GROSS HEMATURIA: ICD-10-CM

## 2025-03-24 DIAGNOSIS — Z86.018 PERSONAL HISTORY OF OTHER BENIGN NEOPLASM: ICD-10-CM

## 2025-03-24 DIAGNOSIS — Z80.1 FAMILY HISTORY OF MALIGNANT NEOPLASM OF TRACHEA, BRONCHUS AND LUNG: ICD-10-CM

## 2025-03-24 DIAGNOSIS — H91.90 UNSPECIFIED HEARING LOSS, UNSPECIFIED EAR: ICD-10-CM

## 2025-03-24 DIAGNOSIS — E04.1 NONTOXIC SINGLE THYROID NODULE: ICD-10-CM

## 2025-03-24 DIAGNOSIS — Z86.79 PERSONAL HISTORY OF OTHER DISEASES OF THE CIRCULATORY SYSTEM: ICD-10-CM

## 2025-03-24 DIAGNOSIS — Z84.1 FAMILY HISTORY OF DISORDERS OF KIDNEY AND URETER: ICD-10-CM

## 2025-03-24 DIAGNOSIS — Z87.891 PERSONAL HISTORY OF NICOTINE DEPENDENCE: ICD-10-CM

## 2025-03-24 PROCEDURE — 52000 CYSTOURETHROSCOPY: CPT

## 2025-03-24 PROCEDURE — 99204 OFFICE O/P NEW MOD 45 MIN: CPT | Mod: 25

## 2025-03-25 ENCOUNTER — NON-APPOINTMENT (OUTPATIENT)
Age: 45
End: 2025-03-25

## 2025-03-25 DIAGNOSIS — Z87.891 PERSONAL HISTORY OF NICOTINE DEPENDENCE: ICD-10-CM

## 2025-03-25 DIAGNOSIS — Z86.018 PERSONAL HISTORY OF OTHER BENIGN NEOPLASM: ICD-10-CM

## 2025-03-25 DIAGNOSIS — E04.1 NONTOXIC SINGLE THYROID NODULE: ICD-10-CM

## 2025-03-25 DIAGNOSIS — Z86.79 PERSONAL HISTORY OF OTHER DISEASES OF THE CIRCULATORY SYSTEM: ICD-10-CM

## 2025-03-25 DIAGNOSIS — H91.90 UNSPECIFIED HEARING LOSS, UNSPECIFIED EAR: ICD-10-CM

## 2025-03-25 DIAGNOSIS — Z80.1 FAMILY HISTORY OF MALIGNANT NEOPLASM OF TRACHEA, BRONCHUS AND LUNG: ICD-10-CM

## 2025-03-25 LAB
APPEARANCE: CLEAR
BACTERIA: NEGATIVE /HPF
BILIRUBIN URINE: NEGATIVE
BLOOD URINE: NEGATIVE
CAST: 1 /LPF
COLOR: YELLOW
EPITHELIAL CELLS: 1 /HPF
GLUCOSE QUALITATIVE U: NEGATIVE MG/DL
KETONES URINE: NEGATIVE MG/DL
LEUKOCYTE ESTERASE URINE: NEGATIVE
MICROSCOPIC-UA: NORMAL
NITRITE URINE: NEGATIVE
PH URINE: 7.5
PROTEIN URINE: 100 MG/DL
RED BLOOD CELLS URINE: 1 /HPF
SPECIFIC GRAVITY URINE: 1.02
URINE CYTOLOGY: NORMAL
UROBILINOGEN URINE: 0.2 MG/DL
WHITE BLOOD CELLS URINE: 2 /HPF

## 2025-03-27 ENCOUNTER — APPOINTMENT (OUTPATIENT)
Dept: INTERNAL MEDICINE | Facility: CLINIC | Age: 45
End: 2025-03-27

## 2025-03-27 ENCOUNTER — MED ADMIN CHARGE (OUTPATIENT)
Age: 45
End: 2025-03-27

## 2025-03-27 PROCEDURE — 90620 MENB-4C VACCINE IM: CPT

## 2025-03-27 PROCEDURE — 90471 IMMUNIZATION ADMIN: CPT

## 2025-03-30 LAB
CREAT 24H UR-MCNC: 2.7 G/24 H
CREAT ?TM UR-MCNC: 102 MG/DL
PROT 24H UR-MRATE: 26 MG/DL
PROT ?TM UR-MCNC: 24 HR
PROT UR-MCNC: 676 MG/24 H
SPECIMEN VOL 24H UR: 2600 ML

## 2025-04-04 ENCOUNTER — APPOINTMENT (OUTPATIENT)
Dept: CT IMAGING | Facility: IMAGING CENTER | Age: 45
End: 2025-04-04
Payer: COMMERCIAL

## 2025-04-04 ENCOUNTER — OUTPATIENT (OUTPATIENT)
Dept: OUTPATIENT SERVICES | Facility: HOSPITAL | Age: 45
LOS: 1 days | End: 2025-04-04
Payer: COMMERCIAL

## 2025-04-04 DIAGNOSIS — Z00.8 ENCOUNTER FOR OTHER GENERAL EXAMINATION: ICD-10-CM

## 2025-04-04 DIAGNOSIS — D13.7 BENIGN NEOPLASM OF ENDOCRINE PANCREAS: ICD-10-CM

## 2025-04-04 PROCEDURE — 74177 CT ABD & PELVIS W/CONTRAST: CPT

## 2025-04-04 PROCEDURE — 74177 CT ABD & PELVIS W/CONTRAST: CPT | Mod: 26

## 2025-04-07 ENCOUNTER — APPOINTMENT (OUTPATIENT)
Dept: INTERNAL MEDICINE | Facility: CLINIC | Age: 45
End: 2025-04-07

## 2025-04-22 ENCOUNTER — APPOINTMENT (OUTPATIENT)
Dept: SURGICAL ONCOLOGY | Facility: CLINIC | Age: 45
End: 2025-04-22
Payer: COMMERCIAL

## 2025-04-22 VITALS
WEIGHT: 248 LBS | OXYGEN SATURATION: 98 % | SYSTOLIC BLOOD PRESSURE: 140 MMHG | HEART RATE: 87 BPM | DIASTOLIC BLOOD PRESSURE: 80 MMHG | BODY MASS INDEX: 35.5 KG/M2 | HEIGHT: 70 IN

## 2025-04-22 DIAGNOSIS — K86.2 CYST OF PANCREAS: ICD-10-CM

## 2025-04-22 PROCEDURE — 99213 OFFICE O/P EST LOW 20 MIN: CPT

## 2025-04-28 ENCOUNTER — APPOINTMENT (OUTPATIENT)
Dept: ENDOCRINOLOGY | Facility: CLINIC | Age: 45
End: 2025-04-28
Payer: COMMERCIAL

## 2025-04-28 VITALS
DIASTOLIC BLOOD PRESSURE: 90 MMHG | WEIGHT: 245 LBS | HEIGHT: 70 IN | HEART RATE: 86 BPM | BODY MASS INDEX: 35.07 KG/M2 | OXYGEN SATURATION: 98 % | SYSTOLIC BLOOD PRESSURE: 146 MMHG

## 2025-04-28 DIAGNOSIS — I10 ESSENTIAL (PRIMARY) HYPERTENSION: ICD-10-CM

## 2025-04-28 DIAGNOSIS — D13.7 BENIGN NEOPLASM OF ENDOCRINE PANCREAS: ICD-10-CM

## 2025-04-28 DIAGNOSIS — E04.1 NONTOXIC SINGLE THYROID NODULE: ICD-10-CM

## 2025-04-28 DIAGNOSIS — E55.9 VITAMIN D DEFICIENCY, UNSPECIFIED: ICD-10-CM

## 2025-04-28 DIAGNOSIS — R73.03 PREDIABETES.: ICD-10-CM

## 2025-04-28 PROCEDURE — G2211 COMPLEX E/M VISIT ADD ON: CPT | Mod: NC

## 2025-04-28 PROCEDURE — 99214 OFFICE O/P EST MOD 30 MIN: CPT

## 2025-04-29 ENCOUNTER — NON-APPOINTMENT (OUTPATIENT)
Age: 45
End: 2025-04-29

## 2025-04-29 LAB
ANION GAP SERPL CALC-SCNC: 15 MMOL/L
APPEARANCE: CLEAR
BACTERIA: NEGATIVE /HPF
BILIRUBIN URINE: NEGATIVE
BLOOD URINE: ABNORMAL
BUN SERPL-MCNC: 10 MG/DL
C PEPTIDE SERPL-MCNC: 1.7 NG/ML
CALCIUM SERPL-MCNC: 9.1 MG/DL
CAST: 1 /LPF
CHLORIDE SERPL-SCNC: 101 MMOL/L
CO2 SERPL-SCNC: 24 MMOL/L
COLOR: YELLOW
CREAT SERPL-MCNC: 0.66 MG/DL
EGFRCR SERPLBLD CKD-EPI 2021: 119 ML/MIN/1.73M2
EPITHELIAL CELLS: 1 /HPF
ESTIMATED AVERAGE GLUCOSE: 131 MG/DL
GLUCOSE QUALITATIVE U: NEGATIVE MG/DL
GLUCOSE SERPL-MCNC: 92 MG/DL
HBA1C MFR BLD HPLC: 6.2 %
KETONES URINE: NEGATIVE MG/DL
LEUKOCYTE ESTERASE URINE: ABNORMAL
MICROSCOPIC-UA: NORMAL
NITRITE URINE: NEGATIVE
PH URINE: 7.5
POTASSIUM SERPL-SCNC: 4 MMOL/L
PROTEIN URINE: 100 MG/DL
RED BLOOD CELLS URINE: 15 /HPF
SODIUM SERPL-SCNC: 140 MMOL/L
SPECIFIC GRAVITY URINE: 1.02
UROBILINOGEN URINE: 1 MG/DL
WHITE BLOOD CELLS URINE: 2 /HPF

## 2025-04-29 RX ORDER — EMPAGLIFLOZIN 10 MG/1
10 TABLET, FILM COATED ORAL DAILY
Qty: 90 | Refills: 1 | Status: ACTIVE | COMMUNITY
Start: 2025-04-29 | End: 1900-01-01

## 2025-04-30 ENCOUNTER — TRANSCRIPTION ENCOUNTER (OUTPATIENT)
Age: 45
End: 2025-04-30

## 2025-06-04 ENCOUNTER — APPOINTMENT (OUTPATIENT)
Dept: PEDIATRIC MEDICAL GENETICS | Facility: CLINIC | Age: 45
End: 2025-06-04
Payer: COMMERCIAL

## 2025-06-04 PROCEDURE — 96041 GENETIC COUNSELING SVC EA 30: CPT

## 2025-06-09 ENCOUNTER — NON-APPOINTMENT (OUTPATIENT)
Age: 45
End: 2025-06-09

## 2025-06-30 ENCOUNTER — APPOINTMENT (OUTPATIENT)
Dept: NEPHROLOGY | Facility: CLINIC | Age: 45
End: 2025-06-30
Payer: COMMERCIAL

## 2025-06-30 VITALS
BODY MASS INDEX: 34.07 KG/M2 | HEART RATE: 78 BPM | OXYGEN SATURATION: 98 % | TEMPERATURE: 97.5 F | HEIGHT: 70 IN | WEIGHT: 238 LBS | SYSTOLIC BLOOD PRESSURE: 139 MMHG | DIASTOLIC BLOOD PRESSURE: 84 MMHG

## 2025-06-30 VITALS — HEART RATE: 70 BPM | SYSTOLIC BLOOD PRESSURE: 132 MMHG | DIASTOLIC BLOOD PRESSURE: 80 MMHG

## 2025-06-30 PROBLEM — R80.8 OTHER PROTEINURIA: Status: ACTIVE | Noted: 2025-06-30

## 2025-06-30 PROCEDURE — 99205 OFFICE O/P NEW HI 60 MIN: CPT

## 2025-06-30 PROCEDURE — G2211 COMPLEX E/M VISIT ADD ON: CPT | Mod: NC

## 2025-06-30 RX ORDER — ELECTROLYTES/DEXTROSE
SOLUTION, ORAL ORAL
Refills: 0 | Status: ACTIVE | COMMUNITY

## 2025-06-30 RX ORDER — FERROUS SULFATE 325(65) MG
325 (65 FE) TABLET ORAL
Refills: 0 | Status: ACTIVE | COMMUNITY

## 2025-06-30 RX ORDER — OMEGA-3/DHA/EPA/FISH OIL 300-1000MG
1000 CAPSULE ORAL
Refills: 0 | Status: ACTIVE | COMMUNITY

## 2025-06-30 RX ORDER — CYANOCOBALAMIN (VITAMIN B-12) 1000 MCG
1000 TABLET ORAL
Refills: 0 | Status: ACTIVE | COMMUNITY

## 2025-07-01 LAB
25(OH)D3 SERPL-MCNC: 27.3 NG/ML
ALBUMIN SERPL ELPH-MCNC: 4.5 G/DL
ALP BLD-CCNC: 71 U/L
ALT SERPL-CCNC: 19 U/L
ANION GAP SERPL CALC-SCNC: 16 MMOL/L
APPEARANCE: CLEAR
AST SERPL-CCNC: 19 U/L
BACTERIA: NEGATIVE /HPF
BILIRUB SERPL-MCNC: 0.5 MG/DL
BILIRUBIN URINE: NEGATIVE
BLOOD URINE: NEGATIVE
BUN SERPL-MCNC: 12 MG/DL
C3 SERPL-MCNC: 122 MG/DL
C4 SERPL-MCNC: 44 MG/DL
CALCIUM SERPL-MCNC: 9.5 MG/DL
CAST: 0 /LPF
CHLORIDE SERPL-SCNC: 103 MMOL/L
CO2 SERPL-SCNC: 21 MMOL/L
COLOR: YELLOW
CREAT SERPL-MCNC: 0.68 MG/DL
CREAT SPEC-SCNC: 98 MG/DL
CREAT SPEC-SCNC: 98 MG/DL
CREAT/PROT UR: 0.3 RATIO
DEPRECATED KAPPA LC FREE/LAMBDA SER: 1.05 RATIO
DSDNA AB SER-ACNC: <1 IU/ML
EGFRCR SERPLBLD CKD-EPI 2021: 118 ML/MIN/1.73M2
EPITHELIAL CELLS: 0 /HPF
FERRITIN SERPL-MCNC: 254 NG/ML
FOLATE SERPL-MCNC: 15.2 NG/ML
GLUCOSE QUALITATIVE U: >=1000 MG/DL
GLUCOSE SERPL-MCNC: 88 MG/DL
HCV AB SER QL: NONREACTIVE
HCV S/CO RATIO: 0.24 S/CO
IRON SATN MFR SERPL: 19 %
IRON SERPL-MCNC: 52 UG/DL
KAPPA LC CSF-MCNC: 1.62 MG/DL
KAPPA LC SERPL-MCNC: 1.7 MG/DL
KETONES URINE: 15 MG/DL
LEUKOCYTE ESTERASE URINE: NEGATIVE
MAGNESIUM SERPL-MCNC: 2.5 MG/DL
MICROALBUMIN 24H UR DL<=1MG/L-MCNC: 6.5 MG/DL
MICROALBUMIN/CREAT 24H UR-RTO: 66 MG/G
MICROSCOPIC-UA: NORMAL
NITRITE URINE: NEGATIVE
PH URINE: 7.5
PHOSPHATE SERPL-MCNC: 3.4 MG/DL
POTASSIUM SERPL-SCNC: 4.2 MMOL/L
PROT SERPL-MCNC: 7.4 G/DL
PROT UR-MCNC: 27 MG/DL
PROTEIN URINE: 30 MG/DL
RED BLOOD CELLS URINE: 1 /HPF
SODIUM SERPL-SCNC: 139 MMOL/L
SPECIFIC GRAVITY URINE: 1.03
TIBC SERPL-MCNC: 279 UG/DL
UIBC SERPL-MCNC: 227 UG/DL
URATE SERPL-MCNC: 3 MG/DL
UROBILINOGEN URINE: 0.2 MG/DL
VIT B12 SERPL-MCNC: 621 PG/ML
WHITE BLOOD CELLS URINE: 1 /HPF

## 2025-07-03 LAB
ALDOSTERONE SERUM: 14.4 NG/DL
ANA TITR SER: NEGATIVE
M PROTEIN SPEC IFE-MCNC: NORMAL

## 2025-07-11 LAB — RENIN ACTIVITY, PLASMA: 0.21 NG/ML/HR

## 2025-07-16 ENCOUNTER — TRANSCRIPTION ENCOUNTER (OUTPATIENT)
Age: 45
End: 2025-07-16

## 2025-07-16 ENCOUNTER — NON-APPOINTMENT (OUTPATIENT)
Age: 45
End: 2025-07-16

## 2025-07-18 ENCOUNTER — APPOINTMENT (OUTPATIENT)
Dept: INTERNAL MEDICINE | Facility: CLINIC | Age: 45
End: 2025-07-18
Payer: COMMERCIAL

## 2025-07-18 ENCOUNTER — NON-APPOINTMENT (OUTPATIENT)
Age: 45
End: 2025-07-18

## 2025-07-18 VITALS
HEIGHT: 70 IN | DIASTOLIC BLOOD PRESSURE: 86 MMHG | SYSTOLIC BLOOD PRESSURE: 144 MMHG | HEART RATE: 73 BPM | WEIGHT: 232 LBS | OXYGEN SATURATION: 99 % | BODY MASS INDEX: 33.21 KG/M2

## 2025-07-18 VITALS — SYSTOLIC BLOOD PRESSURE: 130 MMHG | DIASTOLIC BLOOD PRESSURE: 79 MMHG

## 2025-07-18 LAB
ALBUMIN SERPL ELPH-MCNC: 4.6 G/DL
ALP BLD-CCNC: 69 U/L
ALT SERPL-CCNC: 13 U/L
ANION GAP SERPL CALC-SCNC: 14 MMOL/L
APPEARANCE: CLEAR
AST SERPL-CCNC: 20 U/L
BACTERIA UR CULT: NORMAL
BACTERIA: NEGATIVE /HPF
BILIRUB SERPL-MCNC: 0.6 MG/DL
BILIRUBIN URINE: NEGATIVE
BLOOD URINE: NEGATIVE
BUN SERPL-MCNC: 13 MG/DL
CALCIUM SERPL-MCNC: 9.4 MG/DL
CAST: 0 /LPF
CHLORIDE SERPL-SCNC: 102 MMOL/L
CO2 SERPL-SCNC: 24 MMOL/L
COLOR: YELLOW
CREAT SERPL-MCNC: 0.68 MG/DL
EGFRCR SERPLBLD CKD-EPI 2021: 118 ML/MIN/1.73M2
EPITHELIAL CELLS: 0 /HPF
GLUCOSE QUALITATIVE U: >=1000 MG/DL
GLUCOSE SERPL-MCNC: 90 MG/DL
KETONES URINE: NEGATIVE MG/DL
LEUKOCYTE ESTERASE URINE: NEGATIVE
MICROSCOPIC-UA: NORMAL
NITRITE URINE: NEGATIVE
PH URINE: 7.5
POTASSIUM SERPL-SCNC: 4.1 MMOL/L
PROT SERPL-MCNC: 7.5 G/DL
PROTEIN URINE: NORMAL MG/DL
RED BLOOD CELLS URINE: 0 /HPF
SODIUM SERPL-SCNC: 141 MMOL/L
SPECIFIC GRAVITY URINE: 1.01
UROBILINOGEN URINE: 0.2 MG/DL
WHITE BLOOD CELLS URINE: 0 /HPF

## 2025-07-18 PROCEDURE — G2211 COMPLEX E/M VISIT ADD ON: CPT | Mod: NC

## 2025-07-18 PROCEDURE — 99213 OFFICE O/P EST LOW 20 MIN: CPT

## 2025-07-23 ENCOUNTER — APPOINTMENT (OUTPATIENT)
Dept: PEDIATRIC MEDICAL GENETICS | Facility: CLINIC | Age: 45
End: 2025-07-23
Payer: COMMERCIAL

## 2025-07-23 PROCEDURE — 96041 GENETIC COUNSELING SVC EA 30: CPT | Mod: 95

## 2025-07-24 ENCOUNTER — NON-APPOINTMENT (OUTPATIENT)
Age: 45
End: 2025-07-24

## 2025-08-15 ENCOUNTER — TRANSCRIPTION ENCOUNTER (OUTPATIENT)
Age: 45
End: 2025-08-15

## 2025-09-16 ENCOUNTER — APPOINTMENT (OUTPATIENT)
Dept: NEPHROLOGY | Facility: CLINIC | Age: 45
End: 2025-09-16
Payer: COMMERCIAL

## 2025-09-16 ENCOUNTER — LABORATORY RESULT (OUTPATIENT)
Age: 45
End: 2025-09-16

## 2025-09-16 VITALS — SYSTOLIC BLOOD PRESSURE: 140 MMHG | DIASTOLIC BLOOD PRESSURE: 80 MMHG | HEART RATE: 70 BPM

## 2025-09-16 VITALS
HEART RATE: 72 BPM | DIASTOLIC BLOOD PRESSURE: 90 MMHG | TEMPERATURE: 97.3 F | HEIGHT: 70 IN | BODY MASS INDEX: 31.5 KG/M2 | OXYGEN SATURATION: 99 % | SYSTOLIC BLOOD PRESSURE: 155 MMHG | WEIGHT: 220 LBS

## 2025-09-16 DIAGNOSIS — R80.9 PROTEINURIA, UNSPECIFIED: ICD-10-CM

## 2025-09-16 PROCEDURE — 99214 OFFICE O/P EST MOD 30 MIN: CPT

## 2025-09-16 PROCEDURE — G2211 COMPLEX E/M VISIT ADD ON: CPT | Mod: NC

## 2025-09-17 LAB
25(OH)D3 SERPL-MCNC: 29.3 NG/ML
ALBUMIN SERPL ELPH-MCNC: 4.8 G/DL
ALBUMIN, RANDOM URINE: 1.9 MG/DL
ALDOSTERONE SERUM: 18.4 NG/DL
ALP BLD-CCNC: 68 U/L
ALT SERPL-CCNC: 20 U/L
ANION GAP SERPL CALC-SCNC: 15 MMOL/L
APPEARANCE: CLEAR
AST SERPL-CCNC: 21 U/L
BASOPHILS # BLD AUTO: 0.07 K/UL
BASOPHILS NFR BLD AUTO: 1 %
BILIRUB SERPL-MCNC: 0.9 MG/DL
BILIRUBIN URINE: NEGATIVE
BLOOD URINE: ABNORMAL
BUN SERPL-MCNC: 11 MG/DL
CALCIUM SERPL-MCNC: 9.5 MG/DL
CALCIUM SERPL-MCNC: 9.5 MG/DL
CHLORIDE SERPL-SCNC: 101 MMOL/L
CO2 SERPL-SCNC: 24 MMOL/L
COLOR: YELLOW
CORTIS SERPL-MCNC: 18 UG/DL
CREAT SERPL-MCNC: 0.66 MG/DL
CREAT SPEC-SCNC: 26 MG/DL
EGFRCR SERPLBLD CKD-EPI 2021: 119 ML/MIN/1.73M2
EOSINOPHIL # BLD AUTO: 0.11 K/UL
EOSINOPHIL NFR BLD AUTO: 1.6 %
ESTIMATED AVERAGE GLUCOSE: 123 MG/DL
FERRITIN SERPL-MCNC: 331 NG/ML
GLUCOSE QUALITATIVE U: >=1000 MG/DL
GLUCOSE SERPL-MCNC: 81 MG/DL
HBA1C MFR BLD HPLC: 5.9 %
HCT VFR BLD CALC: 43.2 %
HGB BLD-MCNC: 13.2 G/DL
IMM GRANULOCYTES NFR BLD AUTO: 0.1 %
IRON SATN MFR SERPL: 33 %
IRON SERPL-MCNC: 81 UG/DL
KETONES URINE: ABNORMAL MG/DL
LEUKOCYTE ESTERASE URINE: NEGATIVE
LYMPHOCYTES # BLD AUTO: 1.48 K/UL
LYMPHOCYTES NFR BLD AUTO: 20.9 %
MAGNESIUM SERPL-MCNC: 2.6 MG/DL
MAN DIFF?: NORMAL
MCHC RBC-ENTMCNC: 20.5 PG
MCHC RBC-ENTMCNC: 30.6 G/DL
MCV RBC AUTO: 67.1 FL
MICROALBUMIN/CREAT 24H UR-RTO: 72 MG/G
MONOCYTES # BLD AUTO: 0.75 K/UL
MONOCYTES NFR BLD AUTO: 10.6 %
NEUTROPHILS # BLD AUTO: 4.67 K/UL
NEUTROPHILS NFR BLD AUTO: 65.8 %
NITRITE URINE: NEGATIVE
PARATHYROID HORMONE INTACT: 31 PG/ML
PH URINE: 7.5
PHOSPHATE SERPL-MCNC: 3.6 MG/DL
PLATELET # BLD AUTO: 474 K/UL
POTASSIUM SERPL-SCNC: 4.6 MMOL/L
PROT SERPL-MCNC: 7.7 G/DL
PROTEIN URINE: NEGATIVE MG/DL
RBC # BLD: 6.44 M/UL
RBC # FLD: 19.4 %
SODIUM SERPL-SCNC: 140 MMOL/L
SPECIFIC GRAVITY URINE: 1.01
TIBC SERPL-MCNC: 243 UG/DL
UIBC SERPL-MCNC: 162 UG/DL
URATE SERPL-MCNC: 3.1 MG/DL
UROBILINOGEN URINE: 0.2 MG/DL
WBC # FLD AUTO: 7.09 K/UL

## 2025-09-18 ENCOUNTER — APPOINTMENT (OUTPATIENT)
Dept: UROLOGY | Facility: CLINIC | Age: 45
End: 2025-09-18
Payer: COMMERCIAL

## 2025-09-18 DIAGNOSIS — N20.0 CALCULUS OF KIDNEY: ICD-10-CM

## 2025-09-18 DIAGNOSIS — R31.0 GROSS HEMATURIA: ICD-10-CM

## 2025-09-18 PROCEDURE — 99214 OFFICE O/P EST MOD 30 MIN: CPT | Mod: 25

## 2025-09-18 PROCEDURE — 51798 US URINE CAPACITY MEASURE: CPT

## 2025-09-18 RX ORDER — TAMSULOSIN HYDROCHLORIDE 0.4 MG/1
0.4 CAPSULE ORAL
Qty: 30 | Refills: 3 | Status: ACTIVE | COMMUNITY
Start: 2025-09-18 | End: 1900-01-01

## 2025-09-20 LAB
APPEARANCE: CLEAR
BACTERIA: NEGATIVE /HPF
BILIRUBIN URINE: NEGATIVE
BLOOD URINE: NEGATIVE
CAST: 0 /LPF
COLOR: YELLOW
EPITHELIAL CELLS: 0 /HPF
GLUCOSE QUALITATIVE U: NEGATIVE MG/DL
KETONES URINE: NEGATIVE MG/DL
LEUKOCYTE ESTERASE URINE: NEGATIVE
MICROSCOPIC-UA: NORMAL
NITRITE URINE: NEGATIVE
PH URINE: 7.5
PROTEIN URINE: NEGATIVE MG/DL
PSA FREE FLD-MCNC: 12 %
PSA FREE SERPL-MCNC: 0.14 NG/ML
PSA SERPL-MCNC: 1.09 NG/ML
RED BLOOD CELLS URINE: 0 /HPF
SPECIFIC GRAVITY URINE: 1.01
URINE CYTOLOGY: NORMAL
UROBILINOGEN URINE: 0.2 MG/DL
WHITE BLOOD CELLS URINE: 0 /HPF

## 2025-09-21 LAB — RENIN ACTIVITY, PLASMA: 0.22 NG/ML/HR

## (undated) DEVICE — DRSG STERISTRIPS 0.5 X 4"

## (undated) DEVICE — DRAPE IOBAN 23" X 23"

## (undated) DEVICE — DRAIN PENROSE .25" X 12" SILICONE

## (undated) DEVICE — WARMING BLANKET UPPER ADULT

## (undated) DEVICE — GLV 7 PROTEXIS (WHITE)

## (undated) DEVICE — SUT SOFSILK 2-0 18" C-23

## (undated) DEVICE — XI ARM PERMANENT CAUTERY SPATULA

## (undated) DEVICE — GLV 6.5 PROTEXIS (WHITE)

## (undated) DEVICE — PACK BASIN SPECIAL PROCEDURE

## (undated) DEVICE — XI VESSEL SEALER

## (undated) DEVICE — DRAPE BACK TABLE COVER HEAVY DUTY 60"

## (undated) DEVICE — MEDICATION LABELS W MARKER

## (undated) DEVICE — XI ARM PERMANENT CAUTERY HOOK

## (undated) DEVICE — PACK IV START WITH CHG

## (undated) DEVICE — MARKING PEN W RULER / LABELS

## (undated) DEVICE — DRAIN PENROSE .25" X 18" LATEX

## (undated) DEVICE — SENSOR O2 FINGER ADULT

## (undated) DEVICE — NDL ASPIR EXPECT SLIMLINE 22G

## (undated) DEVICE — STAPLER SKIN VISI-STAT 35 WIDE

## (undated) DEVICE — XI ARM FORCEP PROGRASP 8MM

## (undated) DEVICE — SOL INJ NS 0.9% 500ML 2 PORT

## (undated) DEVICE — CATH IV SAFE BC 20G X 1.16" (PINK)

## (undated) DEVICE — APPLICATOR SURGICEL LAP TROCAR POINT 2.5MM X 150MM

## (undated) DEVICE — MARKER ENDO SPOT EX

## (undated) DEVICE — XI CANNULA SEAL 5MM - 8 MM

## (undated) DEVICE — SUT SOFSILK 3-0 30" V-20

## (undated) DEVICE — GLV 7.5 PROTEXIS (WHITE)

## (undated) DEVICE — BALLOON US ENDO

## (undated) DEVICE — XI SCISSOR TIP COVER

## (undated) DEVICE — FOLEY HOLDER STATLOCK 2 WAY ADULT

## (undated) DEVICE — PREP CHLORAPREP HI-LITE ORANGE 26ML

## (undated) DEVICE — SYR ALLIANCE II INFLATION 60ML

## (undated) DEVICE — SPECIMEN CONTAINER 100ML

## (undated) DEVICE — SUT POLYSORB 0 36" GU-46

## (undated) DEVICE — SUT BIOSYN 4-0 18" P-12

## (undated) DEVICE — XI ARM FORCEP MARYLAND BIPOLAR

## (undated) DEVICE — GLV 8.5 PROTEXIS (WHITE)

## (undated) DEVICE — DRAIN BLAKE 15FR BARD CHANNEL

## (undated) DEVICE — STAPLER COVIDIEN ENDO GIA XL HANDLE

## (undated) DEVICE — STAPLER COVIDIEN ENDO GIA STANDARD HANDLE

## (undated) DEVICE — TUBING SUCTION CONN 6FT STERILE

## (undated) DEVICE — NDL INJ SCLERO INTERJECT 23G

## (undated) DEVICE — TUBING IV SET GRAVITY 3Y 100" MACRO

## (undated) DEVICE — BLADE SCALPEL SAFETYLOCK #10

## (undated) DEVICE — PACK ADVANCED LAPAROSCOPIC NS

## (undated) DEVICE — ELECTRO LUBE ANTI-STICK SOLUTION FOR CAUTERY TIP

## (undated) DEVICE — ENDOCATCH 10MM

## (undated) DEVICE — SOL IRR POUR H2O 250ML

## (undated) DEVICE — XI ARM FORCEP TENACULUM

## (undated) DEVICE — DRAPE TOWEL BLUE 17" X 24"

## (undated) DEVICE — XI DRAPE ARM

## (undated) DEVICE — VENODYNE/SCD SLEEVE CALF LARGE

## (undated) DEVICE — SOL IRR POUR NS 0.9% 500ML

## (undated) DEVICE — TAPE SILK 3"

## (undated) DEVICE — XI ARM NEEDLE DRIVER SUTURECUT MEGA 8MM

## (undated) DEVICE — XI ARM GRASPER TIP UP FENESTRATED

## (undated) DEVICE — XI DRAPE COLUMN

## (undated) DEVICE — DRSG MASTISOL

## (undated) DEVICE — TUBING SUCTION 20FT

## (undated) DEVICE — SUT MAXON 1 96" T-60

## (undated) DEVICE — DRAPE MAYO STAND 30"

## (undated) DEVICE — XI OBTURATOR OPTICAL BLADELESS 8MM

## (undated) DEVICE — TROCAR ETHICON ENDOPATH XCEL BLADELESS 15MM X 100MM STABILITY

## (undated) DEVICE — XI ARM SCISSOR MONO CURVED

## (undated) DEVICE — TUBING IV SET MICROCLAVE ADAPTER

## (undated) DEVICE — DRAPE 3/4 SHEET W REINFORCEMENT 56X77"

## (undated) DEVICE — SUT SOFSILK 2-0 18" V-20 (POP-OFF)

## (undated) DEVICE — BITE BLOCK ADULT 20 X 27MM (GREEN)

## (undated) DEVICE — XI ARM NEEDLE DRIVER MEGA

## (undated) DEVICE — DRAPE INSTRUMENT POUCH 6.75" X 11"

## (undated) DEVICE — XI ARM FORCEP FENESTRATED BIPOLAR 8MM

## (undated) DEVICE — POSITIONER FOAM EGG CRATE ULNAR 2PCS (PINK)

## (undated) DEVICE — TROCAR APPLIED MEDICAL KII BALLOON BLUNT TIP 12MM X 100MM

## (undated) DEVICE — XI ARM DISSECTOR CURVED BIPOLAR 8MM

## (undated) DEVICE — ENDOCATCH II 15MM

## (undated) DEVICE — DRAPE LIGHT HANDLE COVER (BLUE)

## (undated) DEVICE — GOWN TRIMAX LG

## (undated) DEVICE — D HELP - CLEARVIEW CLEARIFY SYSTEM

## (undated) DEVICE — DRAIN JACKSON PRATT 10MM FLAT FULL NO TROCAR

## (undated) DEVICE — XI ARM CLIP APPLIER LARGE

## (undated) DEVICE — XI ARM CLIP APPLIER MEDIUM-LARGE

## (undated) DEVICE — CATH IV SAFE BC 22G X 1" (BLUE)

## (undated) DEVICE — SUCTION YANKAUER NO CONTROL VENT

## (undated) DEVICE — LIGASURE IMPACT

## (undated) DEVICE — TUBING AIRSEAL TRI-LUMEN FILTERED

## (undated) DEVICE — NDL COUNTER FOAM AND MAGNET 40-70

## (undated) DEVICE — TROCAR SURGIQUEST AIRSEAL 12MM X 100MM

## (undated) DEVICE — BLADE SCALPEL SAFETYLOCK #15

## (undated) DEVICE — TROCAR COVIDIEN VERSASTEP PLUS 15MM STANDARD

## (undated) DEVICE — FOLEY TRAY 16FR 5CC LF LUBRISIL ADVANCE TEMP CLOSED

## (undated) DEVICE — WARMING BLANKET LOWER ADULT

## (undated) DEVICE — GLV 8 PROTEXIS (WHITE)

## (undated) DEVICE — SUT POLYSORB 3-0 30" V-20 UNDYED

## (undated) DEVICE — SUT SOFSILK 2-0 30" V-20

## (undated) DEVICE — SUT POLYSORB 0 60" TIES UNDYED

## (undated) DEVICE — XI ARM NEEDLE DRIVER LARGE

## (undated) DEVICE — SUT SOFSILK 3-0 18" V-20 (POP-OFF)

## (undated) DEVICE — DRAIN RESERVOIR FOR JACKSON PRATT 100CC CARDINAL